# Patient Record
Sex: MALE | Race: WHITE | Employment: OTHER | URBAN - METROPOLITAN AREA
[De-identification: names, ages, dates, MRNs, and addresses within clinical notes are randomized per-mention and may not be internally consistent; named-entity substitution may affect disease eponyms.]

---

## 2017-01-13 DIAGNOSIS — I10 ESSENTIAL HYPERTENSION: ICD-10-CM

## 2017-01-13 DIAGNOSIS — I10 HYPERTENSION GOAL BP (BLOOD PRESSURE) < 140/90: Primary | ICD-10-CM

## 2017-01-13 RX ORDER — AMLODIPINE BESYLATE 5 MG/1
10 TABLET ORAL DAILY
Qty: 180 TABLET | Refills: 3 | Status: SHIPPED | OUTPATIENT
Start: 2017-01-13 | End: 2017-06-26

## 2017-01-13 RX ORDER — METOPROLOL SUCCINATE 100 MG/1
100 TABLET, EXTENDED RELEASE ORAL DAILY
Qty: 90 TABLET | Refills: 3 | Status: SHIPPED | OUTPATIENT
Start: 2017-01-13 | End: 2017-06-26

## 2017-01-13 NOTE — TELEPHONE ENCOUNTER
Metoprolol ER Succ    mg  Last Written Prescription Date: 01/08/2016  Last Fill Quantity: 90, # refills: 03    Last Office Visit with [facility], [facility] or [facility] prescribing provider:  12/29/2016   Future Office Visit:        BP Readings from Last 3 Encounters:   09/02/16 130/82   08/28/16 118/72   01/08/16 135/87       Amlodipine 5 mg tabs      Last Written Prescription Date: 01/06/2016  Last Fill Quantity: 180, # refills: 03  Last Office Visit with [facility], [facility] or [facility] prescribing provider: 12/29/2016       POTASSIUM   Date Value Ref Range Status   01/08/2016 4.1 3.4 - 5.3 mmol/L Final     CREATININE   Date Value Ref Range Status   01/08/2016 0.98 0.66 - 1.25 mg/dL Final     BP Readings from Last 3 Encounters:   09/02/16 130/82   08/28/16 118/72   01/08/16 135/87     Adam Bo  Floshayy Technician  [facility]

## 2017-02-09 ENCOUNTER — OFFICE VISIT (OUTPATIENT)
Dept: DERMATOLOGY | Facility: CLINIC | Age: 67
End: 2017-02-09

## 2017-02-09 VITALS — HEART RATE: 85 BPM | SYSTOLIC BLOOD PRESSURE: 121 MMHG | DIASTOLIC BLOOD PRESSURE: 83 MMHG

## 2017-02-09 DIAGNOSIS — L72.11 PILAR CYST: Primary | ICD-10-CM

## 2017-02-09 RX ORDER — LIDOCAINE HYDROCHLORIDE AND EPINEPHRINE 10; 10 MG/ML; UG/ML
3 INJECTION, SOLUTION INFILTRATION; PERINEURAL ONCE
Qty: 3 ML | Refills: 0 | OUTPATIENT
Start: 2017-02-09 | End: 2017-02-09

## 2017-02-09 ASSESSMENT — PAIN SCALES - GENERAL
PAINLEVEL: NO PAIN (0)
PAINLEVEL: NO PAIN (0)

## 2017-02-09 NOTE — LETTER
2/9/2017       RE: Juan Jose Razo  [address] [address]     Dear Colleague,    Thank you for referring your patient, Juan Jose Razo, to the [facility] DERMATOLOGY at [facility]. Please see a copy of my visit note below.    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  Excision was indicated.  We discussed the principles of treatment and most likely complications including bleeding, infection, wound dehiscence, pain, nerve damage, and scarring.  Informed consent was obtained and the patient underwent the procedure as follows.    PROCEDURE:  The patient was taken to the operative suite.  The treatment area was anesthetized with 1% lidocaine and epinephrine mixed 1:100,000 with 0.5% Marcaine.  The area was washed with Hibiclens, rinsed with saline and draped with sterile towels.  The lesion was delineated and excised down to subcutaneous fat.  Hemostasis was obtained by electrocoagulation.  The final excision size was 2.0 cm x 8 cm.      REPAIR:  In order to repair this defect while maintaining the normal anatomic relations and function, we elected to utilize an intermediate linear closure.  Closure was oriented so that the wound was in the patient's natural skin tension lines.  Deeper layers of the subcutaneous tissue were undermined first.  Deep dermal and subcutaneous layer closure was performed using two 3-0 Vicryl deep, intradermal and subcutaneous buried vertical mattresssutures.  Two redundant columns were removed by triangulation.  Final cutaneous approximation was achieved with three 3-0 Prolene simple interrupted  sutures.     The final wound length was 2.0 cm.  A total of 9 ml of anesthesia was administered for all surgical sites.  Estimated blood loss was less than 1 ml for all surgical sites.  A sterile pressure dressing was applied and wound care instructions, with a written handout, were given.  The [facility] alert and ambulatory.    Suture removal: 2 weeks    I have personally examined this patient and agree with Dr. Cunha's documentation and plan of care. I have reviewed and amended the resident's note above. The documentation accurately reflects my clinical observations, diagnoses, treatment and follow-up plans. I was present for key portions of the procedure.       Angelica Monahan MD  Dermatology Staff      Again, thank you for allowing me to participate in the care of your patient.      Sincerely,    Bertram Cunha MD

## 2017-02-09 NOTE — PROGRESS NOTES
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  Excision was indicated.  We discussed the principles of treatment and most likely complications including bleeding, infection, wound dehiscence, pain, nerve damage, and scarring.  Informed consent was obtained and the patient underwent the procedure as follows.    PROCEDURE:  The patient was taken to the operative suite.  The treatment area was anesthetized with 1% lidocaine and epinephrine mixed 1:100,000 with 0.5% Marcaine.  The area was washed with Hibiclens, rinsed with saline and draped with sterile towels.  The lesion was delineated and excised down to subcutaneous fat.  Hemostasis was obtained by electrocoagulation.  The final excision size was 2.0 cm x 8 cm.      REPAIR:  In order to repair this defect while maintaining the normal anatomic relations and function, we elected to utilize an intermediate linear closure.  Closure was oriented so that the wound was in the patient's natural skin tension lines.  Deeper layers of the subcutaneous tissue were undermined first.  Deep dermal and subcutaneous layer closure was performed using two 3-0 Vicryl deep, intradermal and subcutaneous buried vertical mattresssutures.  Two redundant columns were removed by triangulation.  Final cutaneous approximation was achieved with three 3-0 Prolene simple interrupted sutures.     The final wound length was 2.0 cm.  A total of 9 ml of anesthesia was administered for all surgical sites.  Estimated blood loss was less than 1 ml for all surgical sites.  A sterile pressure dressing was applied and wound care instructions, with a written handout, were  given.  The patient was [facility] alert and ambulatory.    Suture removal: 2 weeks    I have personally examined this patient and agree with Dr. Cunha's documentation and plan of care. I have reviewed and amended the resident's note above. The documentation accurately reflects my clinical observations, diagnoses, treatment and follow-up plans. I was present for key portions of the procedure.       Angelica Monahan MD  Dermatology Staff

## 2017-02-09 NOTE — NURSING NOTE
Dermatology Rooming Note    Juan Jose Razo's goals for this visit include:   Chief Complaint   Patient presents with     Derm Problem     Juan Jose is here today for an excision of a cyst on the left side of his scalp.             TIM May

## 2017-02-09 NOTE — PATIENT INSTRUCTIONS
Excision Wound Care Instructions  After your surgery, a pressure bandage will be placed over the area that has sutures. This will help prevent bleeding. Please follow these instructions until you come back to clinic for suture removal in 14 days, as they will help you to prevent complications as your wound heals.  For the First 48 hours After Surgery:  1. Leave the pressure bandage on and keep it dry. If it should come loose, you may retape it, but do not take it off.  2. Relax and take it easy. Do not do any vigorous exercise, heavy lifting, or bending forward. This could cause the wound to bleed.  3. Post-operative pain is usually mild. You may take plain or extra strength Tylenol every 4 hours as needed (do not take more than 4,000mg in one day). Do not take any medicine that contains aspirin, ibuprofen or motrin unless you have been recommended these by a doctor.  Avoid alcohol and vitamin E as these may increase your tendency to bleed.  4. You may put an ice pack around the bandaged area for 20 minutes every 2-3 hours. This may help reduce swelling, bruising, and pain. Make sure the ice pack is waterproof so that the pressure bandage does not get wet.   5. You may see a small amount of drainage or blood on your pressure bandage. This is normal. However, if drainage or bleeding continues or saturates the bandage, you will need to apply firm pressure over the bandage with a washcloth for 15 minutes. If bleeding continues after applying pressure for 15 minutes then go to the nearest emergency room.  48 Hours After Surgery  Carefully remove the bandage and start daily wound care and dressing changes. You may also now shower and get the wound wet. Wash wound with a mild soap and water.  Use caution when washing the wound. Be gentle and do not let the forceful shower stream hit the wound directly.  PAT dry.  Daily Wound Care:  1. Wash wound with a mild soap and water.  Use caution when washing the wound, be gentle  and do not let the forceful shower stream hit the wound directly.  2. PAT DRY.  3. Apply Vaseline (from a new container or tube) over the suture line with a Q-tip. It is very important to keep the wound continuously moist, as wounds heal best in a moist environment.  4.  Keep the site covered until sutures are removed, you can cover it with a Telfa (non-stick) dressing and tape or a band-aid.    5. If you are unable to keep wound covered, you must apply Vaseline every 2 - 3 hours (while awake) to ensure it is being kept moist for optimal healing. A dressing overnight is recommended to keep the area moist.   Call Us If:  1. You have pain that is not controlled with Tylenol.  2. You have signs or symptoms of an infection, such as: fever over 100 degrees F, redness, warmth, or foul-smelling or yellow/creamy drainage from the wound.  Who should I call with questions?    [facility]: [phone]     [facility]: [phone]    For urgent needs outside of business hours call the [facility] at [phone] and ask for the dermatology resident on call

## 2017-02-09 NOTE — MR AVS SNAPSHOT
After Visit Summary   2/9/2017    Juan Jose Razo    MRN: [id number]           Patient Information     Date Of Birth          1950        Visit Information        Provider Department      2/9/2017 9:15 AM Bertram Cunha MD [facility] Dermatology        Today's Diagnoses     Pilar cyst    -  1       Care Instructions    Excision Wound Care Instructions  After your surgery, a pressure bandage will be placed over the area that has sutures. This will help prevent bleeding. Please follow these instructions until you come back to clinic for suture removal in 14 days, as they will help you to prevent complications as your wound heals.  For the First 48 hours After Surgery:  1. Leave the pressure bandage on and keep it dry. If it should come loose, you may retape it, but do not take it off.  2. Relax and take it easy. Do not do any vigorous exercise, heavy lifting, or bending forward. This could cause the wound to bleed.  3. Post-operative pain is usually mild. You may take plain or extra strength Tylenol every 4 hours as needed (do not take more than 4,000mg in one day). Do not take any medicine that contains aspirin, ibuprofen or motrin unless you have been recommended these by a doctor.  Avoid alcohol and vitamin E as these may increase your tendency to bleed.  4. You may put an ice pack around the bandaged area for 20 minutes every 2-3 hours. This may help reduce swelling, bruising, and pain. Make sure the ice pack is waterproof so that the pressure bandage does not get wet.   5. You may see a small amount of drainage or blood on your pressure bandage. This is normal. However, if drainage or bleeding continues or saturates the bandage, you will need to apply firm pressure over the bandage with a washcloth for 15 minutes. If bleeding continues after applying pressure for 15 minutes then go to the nearest emergency room.  48 Hours After Surgery  Carefully remove the bandage and start daily wound care  and dressing changes. You may also now shower and get the wound wet. Wash wound with a mild soap and water.  Use caution when washing the wound. Be gentle and do not let the forceful shower stream hit the wound directly.  PAT dry.  Daily Wound Care:  1. Wash wound with a mild soap and water.  Use caution when washing the wound, be gentle and do not let the forceful shower stream hit the wound directly.  2. PAT DRY.  3. Apply Vaseline (from a new container or tube) over the suture line with a Q-tip. It is very important to keep the wound continuously moist, as wounds heal best in a moist environment.  4.  Keep the site covered until sutures are removed, you can cover it with a Telfa (non-stick) dressing and tape or a band-aid.    5. If you are unable to keep wound covered, you must apply Vaseline every 2 - 3 hours (while awake) to ensure it is being kept moist for optimal healing. A dressing overnight is recommended to keep the area moist.   Call Us If:  1. You have pain that is not controlled with Tylenol.  2. You have signs or symptoms of an infection, such as: fever over 100 degrees F, redness, warmth, or foul-smelling or yellow/creamy drainage from the wound.  Who should I call with questions?    [facility]: [phone]     [facility]: [phone]    For urgent needs outside of business hours call the [facility] at [phone] and ask for the dermatology resident on call          Follow-ups after your visit        Who to contact     Please call your clinic at [phone] to:    Ask questions about your health    Make or cancel appointments    Discuss your medicines    Learn about your test results    Speak to your doctor   If you have compliments or concerns about an experience at your clinic, or if you wish to file a complaint, please contact [facility] Patient Relations at [phone] or email us at  [email or web address]         Additional Information About Your Visit        MyChart Information     my[employer] gives you secure access to your electronic health record. If you see a primary care provider, you can also send messages to your care team and make appointments. If you have questions, please call your primary care clinic.  If you do not have a primary care provider, please call [phone] and they will assist you.      [employer] is an electronic gateway that provides easy, online access to your medical records. With [employer], you can request a clinic appointment, read your test results, renew a prescription or communicate with your care team.     To access your existing account, please contact your [facility] or call [phone] for assistance.        Care EveryWhere ID     This is your Care EveryWhere ID. This could be used by other organizations to access your [place] medical records  [id number]        Your Vitals Were     Pulse                   85            Blood Pressure from Last 3 Encounters:   02/09/17 121/83   09/02/16 130/82   08/28/16 118/72    Weight from Last 3 Encounters:   09/02/16 127.461 kg (281 lb)   08/28/16 125.193 kg (276 lb)   01/08/16 127.461 kg (281 lb)              We Performed the Following     Surgical pathology exam        Primary Care Provider Office Phone # Fax #    Alphonso Ceasar Grover [phone]       [facility] [address] [address]        Thank you!     Thank you for choosing [facility] DERMATOLOGY  for your care. Our goal is always to provide you with excellent care. Hearing back from our patients is one way we can continue to improve our services. Please take a few minutes to complete the written survey that you may receive in the mail after your visit with us. Thank you!             Your Updated Medication List - Protect others around you: Learn how to safely use, store  and throw away your medicines at www.disposemymeds.org.          This list is accurate as of: 2/9/17 10:04 AM.  Always use your most recent med list.                   Brand Name Dispense Instructions for use    amLODIPine 5 MG tablet    NORVASC    180 tablet    Take 2 tablets (10 mg) by mouth daily Please schedule an annual exam and fasting labs with Dr. Grover before any further refills       CENTRUM SILVER ULTRA MENS PO          cholecalciferol 1000 UNIT tablet    vitamin D     Take by mouth daily       clotrimazole 1 % cream    LOTRIMIN    30 g    Apply topically 2 times daily       Desloratadine-Pseudoephedrine 5-240 MG Tb24    CLARINEX-D 24 HOUR    30 tablet    Take 1 tablet by mouth daily as needed       hydrocortisone 0.2 % cream    WESTCORT    60 g    Apply topically 2 times daily       hydrocortisone 2.5 % ointment     30 g    Apply topically 2 times daily       LANsoprazole 30 MG CR capsule    PREVACID    90 capsule    Take 1 capsule (30 mg) by mouth daily       metoprolol 100 MG 24 hr tablet    TOPROL-XL    90 tablet    Take 1 tablet (100 mg) by mouth daily Please schedule an annual exam and fasting labs with Dr. Grover before any further refills       ranitidine 300 MG tablet    ZANTAC    90 tablet    Take 1 tablet (300 mg) by mouth At Bedtime       sildenafil 100 MG cap/tab    VIAGRA    6 tablet    Take 0.5-1 tablets ( mg) by mouth daily as needed for erectile dysfunction Take 30 min to 4 hours before intercourse.  Never use with nitroglycerin, terazosin or doxazosin.

## 2017-02-13 LAB — COPATH REPORT: NORMAL

## 2017-05-24 DIAGNOSIS — L30.4 INTERTRIGO: ICD-10-CM

## 2017-05-24 NOTE — TELEPHONE ENCOUNTER
Last seen 12/29/16.  Is on the recall list.        Resolved intertrigo. Keep area as dry as possible. Restart   nystatin cream tid and hydrocortisone 0.2% cream twice daily to affected areas if this returns until clear. Return to the office if this is not helping and seems to be worsening.    2. Skin tags without inflammation.   Snip removal (performed by faculty): After discussion of benefits and risks including but not limited to bleeding, infection, scar, incomplete removal, recurrence, and non-diagnostic biopsy, written consent and photographs were obtained. Time-out was performed. The area was cleaned with isopropyl alcohol. 4 mL of 1% lidocaine with epinephrine was injected to obtain adequate anesthesia of the lesions on the bilateral axilla.  The specimen were sent for pathology. Hemostasis was achieved with electrocaudery. Vaseline was applied. The patient tolerated the procedure and no complications were noted. The patient was provided with verbal and written post care instructions.      All risks, benefits and alternatives were discussed with patient.  Patient is in agreement and understands the assessment and plan.  All questions were answered.  Sun Screen Education was given.   Return to Clinic annually or sooner as needed.   Imelda Rincon PA-C

## 2017-05-25 RX ORDER — HYDROCORTISONE VALERATE CREAM 2 MG/G
CREAM TOPICAL 2 TIMES DAILY
Qty: 60 G | Refills: 1 | Status: SHIPPED | OUTPATIENT
Start: 2017-05-25 | End: 2018-01-22

## 2017-06-26 ENCOUNTER — OFFICE VISIT (OUTPATIENT)
Dept: FAMILY MEDICINE | Facility: CLINIC | Age: 67
End: 2017-06-26
Payer: COMMERCIAL

## 2017-06-26 VITALS
BODY MASS INDEX: 34.22 KG/M2 | HEART RATE: 77 BPM | DIASTOLIC BLOOD PRESSURE: 88 MMHG | WEIGHT: 273.8 LBS | TEMPERATURE: 98.1 F | OXYGEN SATURATION: 96 % | SYSTOLIC BLOOD PRESSURE: 120 MMHG

## 2017-06-26 DIAGNOSIS — I10 HYPERTENSION GOAL BP (BLOOD PRESSURE) < 140/90: Primary | ICD-10-CM

## 2017-06-26 DIAGNOSIS — Z00.00 ROUTINE GENERAL MEDICAL EXAMINATION AT A HEALTH CARE FACILITY: ICD-10-CM

## 2017-06-26 DIAGNOSIS — K21.9 GASTROESOPHAGEAL REFLUX DISEASE WITHOUT ESOPHAGITIS: ICD-10-CM

## 2017-06-26 DIAGNOSIS — Z11.59 NEED FOR HEPATITIS C SCREENING TEST: ICD-10-CM

## 2017-06-26 DIAGNOSIS — N52.01 ERECTILE DYSFUNCTION DUE TO ARTERIAL INSUFFICIENCY: ICD-10-CM

## 2017-06-26 DIAGNOSIS — Z13.6 CARDIOVASCULAR SCREENING; LDL GOAL LESS THAN 130: ICD-10-CM

## 2017-06-26 DIAGNOSIS — B35.4 TINEA CORPORIS: ICD-10-CM

## 2017-06-26 LAB
CREAT UR-MCNC: 319 MG/DL
MICROALBUMIN UR-MCNC: 18 MG/L
MICROALBUMIN/CREAT UR: 5.71 MG/G CR (ref 0–17)

## 2017-06-26 PROCEDURE — 82043 UR ALBUMIN QUANTITATIVE: CPT | Performed by: FAMILY MEDICINE

## 2017-06-26 PROCEDURE — 86803 HEPATITIS C AB TEST: CPT | Performed by: FAMILY MEDICINE

## 2017-06-26 PROCEDURE — 99396 PREV VISIT EST AGE 40-64: CPT | Mod: 25 | Performed by: FAMILY MEDICINE

## 2017-06-26 PROCEDURE — 80061 LIPID PANEL: CPT | Performed by: FAMILY MEDICINE

## 2017-06-26 PROCEDURE — 90715 TDAP VACCINE 7 YRS/> IM: CPT | Performed by: FAMILY MEDICINE

## 2017-06-26 PROCEDURE — 90471 IMMUNIZATION ADMIN: CPT | Performed by: FAMILY MEDICINE

## 2017-06-26 PROCEDURE — 80053 COMPREHEN METABOLIC PANEL: CPT | Performed by: FAMILY MEDICINE

## 2017-06-26 PROCEDURE — 36415 COLL VENOUS BLD VENIPUNCTURE: CPT | Performed by: FAMILY MEDICINE

## 2017-06-26 RX ORDER — SILDENAFIL 100 MG/1
50-100 TABLET, FILM COATED ORAL DAILY PRN
Qty: 6 TABLET | Refills: 1 | Status: SHIPPED | OUTPATIENT
Start: 2017-06-26 | End: 2018-12-18

## 2017-06-26 RX ORDER — AMLODIPINE BESYLATE 5 MG/1
10 TABLET ORAL DAILY
Qty: 180 TABLET | Refills: 3 | Status: SHIPPED | OUTPATIENT
Start: 2017-06-26 | End: 2018-07-02

## 2017-06-26 RX ORDER — LANSOPRAZOLE 30 MG/1
30 CAPSULE, DELAYED RELEASE ORAL DAILY
Qty: 90 CAPSULE | Refills: 3 | Status: SHIPPED | OUTPATIENT
Start: 2017-06-26 | End: 2018-08-08

## 2017-06-26 RX ORDER — METOPROLOL SUCCINATE 100 MG/1
100 TABLET, EXTENDED RELEASE ORAL DAILY
Qty: 90 TABLET | Refills: 3 | Status: SHIPPED | OUTPATIENT
Start: 2017-06-26 | End: 2018-07-02

## 2017-06-26 RX ORDER — CLOTRIMAZOLE 1 %
CREAM (GRAM) TOPICAL 2 TIMES DAILY
Qty: 30 G | Refills: 1 | Status: SHIPPED | OUTPATIENT
Start: 2017-06-26 | End: 2019-10-24

## 2017-06-26 NOTE — PROGRESS NOTES
SUBJECTIVE:     CC: Juan Jose Razo is an 66 year old male who presents for preventative health visit.     Physical   Annual:     Getting at least 3 servings of Calcium per day::  NO    Bi-annual eye exam::  Yes    Dental care twice a year::  Yes    Sleep apnea or symptoms of sleep apnea::  Excessive snoring    Frequency of exercise::  None    Taking medications regularly::  Yes    Medication side effects::  None    Additional concerns today::  No      Encounter Diagnoses   Name Primary?     Routine general medical examination at a health care facility      Need for hepatitis C screening test      Hypertension goal BP (blood pressure) < 140/90 Yes     CARDIOVASCULAR SCREENING; LDL GOAL LESS THAN 130      Erectile dysfunction due to arterial insufficiency      Tinea corporis      Gastroesophageal reflux disease without esophagitis         Hypertension Follow-up      Outpatient blood pressures are not being checked.    Low Salt Diet: no added salt      Today's PHQ-2 Score:   PHQ-2 ( 1999 Pfizer) 6/23/2017   Q1: Little interest or pleasure in doing things Not at all   Q2: Feeling down, depressed or hopeless Not at all   PHQ-2 Score 0       Abuse: Current or Past(Physical, Sexual or Emotional)- No  Do you feel safe in your environment - Yes    Social History   Substance Use Topics     Smoking status: Never Smoker     Smokeless tobacco: Never Used     Alcohol use Yes          Standardized Alcohol Screening Questionnaire  AUDIT   Questions 0 1 2 3 4 Score   1. How often do you have a drink  containing alcohol? Never Monthly or less 2 to 4  times a  month 2 to 3  times a  week 4 or more  times a  week  1   2. How many drinks containing alcohol  do you have on a typical day when you are drinking? 1 or 2 3 or 4 5 or 6 7 to 9 10 or more  0   3. How often do you have more than five  or more drinks on one occasion? Never Less  than  monthly Monthly Weekly Daily or  almost  daily  0   4. How often during the last year have  you  found that you were not able to stop drinking once you had started? Never Less  than  monthly Monthly Weekly Daily or  almost  daily  0   5. How often during the last year have  you failed to do what was normally expected of you because of drinking? Never Less  than  monthly Monthly Weekly Daily or  almost  daily  0   6. How often during the last year have  you needed a first drink in the morning to get yourself going after a heavy drinking session? Never Less  than  monthly Monthly Weekly Daily or  almost  daily  0   7. How often during the last year have you had a feeling of guilt or remorse after drinking? Never Less  than  monthly Monthly Weekly Daily or  almost  daily  0   8. How often during the last year have  you been unable to remember what happened the night before because of your drinking? Never Less  than  monthly Monthly Weekly Daily or  almost  daily  0   9. Have you or someone else been  injured because of your drinking? No  Yes, but not in the last year  Yes,  during the  last year  0   10. Has a relative, friend, doctor or other health care worker been concerned about your drinking or suggested you cut down? No  Yes, but not in the last year  Yes,  during the  last year  0   Total  1   Scoring: A score of 7 for adult men is an indication of hazardous drinking (risk for physical or physiological harm); a score of 8 or more is an indication of an alcohol use disorder. A score of 5 or more for adult women  is an indication of hazardous drinking or an alcohol use disorder.       Last PSA:   PSA   Date Value Ref Range Status   05/18/2010 0.7 ng/mL        Recent Labs   Lab Test  01/08/16   0849  11/14/14   0958  09/17/13   1040   CHOL  147  153  156   HDL  30*  38*  29*   LDL  91  79  77   TRIG  132  178*  250*   CHOLHDLRATIO   --   4.0  5.4*   NHDL  117   --    --        Reviewed orders with patient. Reviewed health maintenance and updated orders accordingly - Yes    Reviewed and updated as needed this  visit by clinical staff  Tobacco  Allergies  Meds  Med Hx  Surg Hx  Fam Hx  Soc Hx        Reviewed and updated as needed this visit by Provider        Past Medical History:   Diagnosis Date     Acid reflux      Allergies      HTN (hypertension) 1990     Squamous cell carcinoma      Varicose veins         ROS:  C: NEGATIVE for fever, chills, change in weight  I: NEGATIVE for worrisome rashes, moles or lesions  E: NEGATIVE for vision changes or irritation  ENT: NEGATIVE for ear, mouth and throat problems  R: NEGATIVE for significant cough or SOB  CV: NEGATIVE for chest pain, palpitations or peripheral edema  GI: NEGATIVE for nausea, abdominal pain, heartburn, or change in bowel habits   male: negative for dysuria, hematuria, decreased urinary stream, erectile dysfunction, urethral discharge  M: NEGATIVE for significant arthralgias or myalgia  N: NEGATIVE for weakness, dizziness or paresthesias  E: NEGATIVE for temperature intolerance, skin/hair changes  P: NEGATIVE for changes in mood or affect    Problem list, Medication list, Allergies, and Medical/Social/Surgical histories reviewed in EPIC and updated as appropriate.  OBJECTIVE:     /88  Pulse 77  Temp 98.1  F (36.7  C) (Oral)  Wt 273 lb 12.8 oz (124.2 kg)  SpO2 96%  BMI 34.22 kg/m2  EXAM:  GENERAL: healthy, alert and no distress  EYES: Eyes grossly normal to inspection, PERRL and conjunctivae and sclerae normal  HENT: ear canals and TM's normal, nose and mouth without ulcers or lesions  NECK: no adenopathy, no asymmetry, masses, or scars and thyroid normal to palpation  RESP: lungs clear to auscultation - no rales, rhonchi or wheezes  CV: regular rate and rhythm, normal S1 S2, no S3 or S4, no murmur, click or rub, no peripheral edema and peripheral pulses strong  ABDOMEN: soft, nontender, no hepatosplenomegaly, no masses and bowel sounds normal   (male): normal male genitalia without lesions or urethral discharge, no hernia  RECTAL (male):  normal sphincter tone, no rectal masses, prostate normal size, smooth, nontender without nodules or masses  MS: no gross musculoskeletal defects noted, no edema  SKIN: no suspicious lesions or rashes  NEURO: Normal strength and tone, mentation intact and speech normal  PSYCH: mentation appears normal, affect normal/bright  LYMPH: no cervical, supraclavicular, axillary, or inguinal adenopathy    ASSESSMENT/PLAN:     1. Routine general medical examination at a health care facility  overal well  - Comprehensive metabolic panel (BMP + Alb, Alk Phos, ALT, AST, Total. Bili, TP)  - TDAP VACCINE (BOOSTRIX)    2. Need for hepatitis C screening test  sent  - Hepatitis C Screen Reflex to HCV RNA Quant and Genotype       3. Hypertension goal BP (blood pressure) < 140/90  Well controlled   - Comprehensive metabolic panel (BMP + Alb, Alk Phos, ALT, AST, Total. Bili, TP)  - Lipid Profile (Chol, Trig, HDL, LDL calc)  - amLODIPine (NORVASC) 5 MG tablet; Take 2 tablets (10 mg) by mouth daily  Dispense: 180 tablet; Refill: 3  - metoprolol (TOPROL-XL) 100 MG 24 hr tablet; Take 1 tablet (100 mg) by mouth daily  Dispense: 90 tablet; Refill: 3  - Albumin Random Urine Quantitative    4. CARDIOVASCULAR SCREENING; LDL GOAL LESS THAN 130  recheck    5. Erectile dysfunction due to arterial insufficiency  try  - sildenafil (VIAGRA) 100 MG cap/tab; Take 0.5-1 tablets ( mg) by mouth daily as needed for erectile dysfunction Take 30 min to 4 hours before intercourse.  Never use with nitroglycerin, terazosin or doxazosin.  Dispense: 6 tablet; Refill: 1    6. Tinea corporis  Ok refill  - clotrimazole (LOTRIMIN) 1 % cream; Apply topically 2 times daily  Dispense: 30 g; Refill: 1    7. Gastroesophageal reflux disease without esophagitis  Well controlled   - ranitidine (ZANTAC) 300 MG tablet; Take 1 tablet (300 mg) by mouth At Bedtime  Dispense: 90 tablet; Refill: 3  - LANsoprazole (PREVACID) 30 MG CR capsule; Take 1 capsule (30 mg) by mouth daily  " Dispense: 90 capsule; Refill: 3    COUNSELING:   Reviewed preventive health counseling, as reflected in patient instructions       Regular exercise       Healthy diet/nutrition       Vision screening       Hearing screening       Safe sex practices/STD prevention       Colon cancer screening       Prostate cancer screening    Having some ED issues/ wants to try medication      reports that he has never smoked. He has never used smokeless tobacco.    Estimated body mass index is 34.22 kg/(m^2) as calculated from the following:    Height as of 1/8/16: 6' 3\" (1.905 m).    Weight as of this encounter: 273 lb 12.8 oz (124.2 kg).   Weight management plan: Discussed healthy diet and exercise guidelines and patient will follow up in 12 months in clinic to re-evaluate.    Counseling Resources:  ATP IV Guidelines  Pooled Cohorts Equation Calculator  FRAX Risk Assessment  ICSI Preventive Guidelines  Dietary Guidelines for Americans, 2010  USDA's MyPlate  ASA Prophylaxis  Lung CA Screening    Alphonso Grover MD  [facility]  Answers for HPI/ROS submitted by the patient on 6/23/2017   PHQ-2 Score: 0    "

## 2017-06-26 NOTE — PROGRESS NOTES
"Chief Complaint   Patient presents with     Physical       Initial /88  Pulse 77  Temp 98.1  F (36.7  C) (Oral)  Wt 273 lb 12.8 oz (124.2 kg)  SpO2 96%  BMI 34.22 kg/m2 Estimated body mass index is 34.22 kg/(m^2) as calculated from the following:    Height as of 1/8/16: 6' 3\" (1.905 m).    Weight as of this encounter: 273 lb 12.8 oz (124.2 kg).  Medication Reconciliation: complete     Marilin Ferreira, Student MA         "

## 2017-06-27 LAB
ALBUMIN SERPL-MCNC: 4.1 G/DL (ref 3.4–5)
ALP SERPL-CCNC: 87 U/L (ref 40–150)
ALT SERPL W P-5'-P-CCNC: 45 U/L (ref 0–70)
ANION GAP SERPL CALCULATED.3IONS-SCNC: 7 MMOL/L (ref 3–14)
AST SERPL W P-5'-P-CCNC: 35 U/L (ref 0–45)
BILIRUB SERPL-MCNC: 1.4 MG/DL (ref 0.2–1.3)
BUN SERPL-MCNC: 18 MG/DL (ref 7–30)
CALCIUM SERPL-MCNC: 9.1 MG/DL (ref 8.5–10.1)
CHLORIDE SERPL-SCNC: 104 MMOL/L (ref 94–109)
CHOLEST SERPL-MCNC: 181 MG/DL
CO2 SERPL-SCNC: 26 MMOL/L (ref 20–32)
CREAT SERPL-MCNC: 1.15 MG/DL (ref 0.66–1.25)
GFR SERPL CREATININE-BSD FRML MDRD: 64 ML/MIN/1.7M2
GLUCOSE SERPL-MCNC: 89 MG/DL (ref 70–99)
HCV AB SERPL QL IA: NORMAL
HDLC SERPL-MCNC: 42 MG/DL
LDLC SERPL CALC-MCNC: 104 MG/DL
NONHDLC SERPL-MCNC: 139 MG/DL
POTASSIUM SERPL-SCNC: 4.7 MMOL/L (ref 3.4–5.3)
PROT SERPL-MCNC: 8.3 G/DL (ref 6.8–8.8)
SODIUM SERPL-SCNC: 137 MMOL/L (ref 133–144)
TRIGL SERPL-MCNC: 174 MG/DL

## 2017-08-10 DIAGNOSIS — J30.1 ACUTE SEASONAL ALLERGIC RHINITIS DUE TO POLLEN: Primary | ICD-10-CM

## 2017-08-10 NOTE — TELEPHONE ENCOUNTER
Patient requesting refill of:     Desloratadine-Pseudoephedrine (CLARINEX-D 24 HOUR) 5-240 MG TB24    Last prescribed: 09/10/2015 (routing to provider, medication last prescribed more than one year ago).  Last office visit: 06/262017    Lisa Hernandez RN  [facility]

## 2017-08-10 NOTE — TELEPHONE ENCOUNTER
Clarinex      Last Written Prescription Date: 9/10/15  Last Fill Quantity: 30,  # refills: 2   Last Office Visit with G, P or [facility] prescribing provider: 6/26/17

## 2017-08-11 ENCOUNTER — TELEPHONE (OUTPATIENT)
Dept: FAMILY MEDICINE | Facility: CLINIC | Age: 67
End: 2017-08-11

## 2017-08-11 DIAGNOSIS — J30.1 ACUTE SEASONAL ALLERGIC RHINITIS DUE TO POLLEN: Primary | ICD-10-CM

## 2017-08-11 NOTE — TELEPHONE ENCOUNTER
Pharmacy states the Desloratadine-Pseudoephedrine (CLARINEX-D 24 HOUR) 5-240 MG TB24 I no longer available in 24 tabs, they are available in 1 hr and pharmacy is requesting to change to that 2 times a day    Please call [phone]

## 2017-08-11 NOTE — TELEPHONE ENCOUNTER
Dr. Grover    24 med is not available    Cued the 12 hour beulah Ruiz RN   [facility]

## 2017-09-26 ENCOUNTER — ALLIED HEALTH/NURSE VISIT (OUTPATIENT)
Dept: NURSING | Facility: CLINIC | Age: 67
End: 2017-09-26
Payer: COMMERCIAL

## 2017-09-26 DIAGNOSIS — Z23 NEED FOR PROPHYLACTIC VACCINATION AND INOCULATION AGAINST INFLUENZA: Primary | ICD-10-CM

## 2017-09-26 PROCEDURE — 90688 IIV4 VACCINE SPLT 0.5 ML IM: CPT

## 2017-09-26 PROCEDURE — 90471 IMMUNIZATION ADMIN: CPT

## 2017-09-26 PROCEDURE — 99207 ZZC NO CHARGE NURSE ONLY: CPT

## 2017-09-26 NOTE — MR AVS SNAPSHOT
After Visit Summary   9/26/2017    Juan Jose Razo    MRN: [id number]           Patient Information     Date Of Birth          1950        Visit Information        Provider Department      9/26/2017 10:00 AM MAUDE MCCALLUM MA/LPN [facility]        Today's Diagnoses     Need for prophylactic vaccination and inoculation against influenza    -  1       Follow-ups after your visit        Who to contact     If you have questions or need follow up information about today's clinic visit or your schedule please contact [facility] directly at [phone].  Normal or non-critical lab and imaging results will be communicated to you by [email or web address], letter or phone within 4 business days after the clinic has received the results. If you do not hear from us within 7 days, please contact the clinic through [email or web address] or phone. If you have a critical or abnormal lab result, we will notify you by phone as soon as possible.  Submit refill requests through Loopt or call your pharmacy and they will forward the refill request to us. Please allow 3 business days for your refill to be completed.          Additional Information About Your Visit        MyChart Information     Loopt gives you secure access to your electronic health record. If you see a primary care provider, you can also send messages to your care team and make appointments. If you have questions, please call your primary care clinic.  If you do not have a primary care provider, please call [phone] and they will assist you.        Care EveryWhere ID     This is your Care EveryWhere ID. This could be used by other organizations to access your [facility] medical records  [id number]         Blood Pressure from Last 3 Encounters:   06/26/17 120/88   02/09/17 121/83   09/02/16 130/82    Weight from Last 3 Encounters:   06/26/17 273 lb 12.8 oz (124.2 kg)   09/02/16 281 lb (127.5 kg)   08/28/16 276 lb (125.2 kg)              We  Performed the Following     FLU VACCINE, 3 YRS +, IM (QUADRIVALENT W/PRESERVATIVES/MULTI-DOSE) [70908]     Vaccine Administration, Initial [89649]        Primary Care Provider Office Phone # Fax #    Alphonso Ceasar Grover [phone]       [address] [facility] [address]  [facility] [address]        Equal Access to Services     [facility]: Hadii aad ku hadasho Soomaali, waaxda luqadaha, qaybta kaalmada adeegyada, waxay idiin hayaan adeeg khdannie latrell . So [facility] [phone].    ATENCIÓN: Si habla esplan, tiene a lo disposición servicios gratuitos de asistencia lingüística. Fabienne al [phone].    We comply with applicable federal civil rights laws and [place] laws. We do not discriminate on the basis of race, color, national origin, age, disability sex, sexual orientation or gender identity.            Thank you!     Thank you for choosing [facility]  for your care. Our goal is always to provide you with excellent care. Hearing back from our patients is one way we can continue to improve our services. Please take a few minutes to complete the written survey that you may receive in the mail after your visit with us. Thank you!             Your Updated Medication List - Protect others around you: Learn how to safely use, store and throw away your medicines at www.disposemymeds.org.          This list is accurate as of: 9/26/17 10:07 AM.  Always use your most recent med list.                   Brand Name Dispense Instructions for use Diagnosis    amLODIPine 5 MG tablet    NORVASC    180 tablet    Take 2 tablets (10 mg) by mouth daily    Hypertension goal BP (blood pressure) < 140/90       CENTRUM SILVER ULTRA MENS PO           cholecalciferol 1000 UNIT tablet    vitamin D     Take by mouth daily        clotrimazole 1 % cream    LOTRIMIN    30 g    Apply topically 2 times daily    Tinea corporis       * Desloratadine-Pseudoephedrine 5-240 MG Tb24    CLARINEX-D 24 HOUR    30 tablet     Take 1 tablet by mouth daily as needed    Acute seasonal allergic rhinitis due to pollen       * Desloratadine-Pseudoephedrine 2.5-120 MG Tb12    CLARINEX-D 12 HOUR    60 tablet    Take 1 capsule by mouth 2 times daily    Acute seasonal allergic rhinitis due to pollen       hydrocortisone 0.2 % cream    WESTCORT    60 g    Apply topically 2 times daily    Intertrigo       hydrocortisone 2.5 % ointment     30 g    Apply topically 2 times daily    Tinea corporis       LANsoprazole 30 MG CR capsule    PREVACID    90 capsule    Take 1 capsule (30 mg) by mouth daily    Gastroesophageal reflux disease without esophagitis       metoprolol 100 MG 24 hr tablet    TOPROL-XL    90 tablet    Take 1 tablet (100 mg) by mouth daily    Hypertension goal BP (blood pressure) < 140/90       ranitidine 300 MG tablet    ZANTAC    90 tablet    Take 1 tablet (300 mg) by mouth At Bedtime    Gastroesophageal reflux disease without esophagitis       * sildenafil 100 MG tablet    VIAGRA    6 tablet    Take 0.5-1 tablets ( mg) by mouth daily as needed for erectile dysfunction Take 30 min to 4 hours before intercourse.  Never use with nitroglycerin, terazosin or doxazosin.    ED (erectile dysfunction)       * sildenafil 100 MG tablet    VIAGRA    6 tablet    Take 0.5-1 tablets ( mg) by mouth daily as needed for erectile dysfunction Take 30 min to 4 hours before intercourse.  Never use with nitroglycerin, terazosin or doxazosin.    Erectile dysfunction due to arterial insufficiency       * Notice:  This list has 4 medication(s) that are the same as other medications prescribed for you. Read the directions carefully, and ask your doctor or other care provider to review them with you.

## 2017-09-26 NOTE — PROGRESS NOTES
Injectable Influenza Immunization Documentation    1.  Is the person to be vaccinated sick today?  Yes    2. Does the person to be vaccinated have an allergy to a component   of the vaccine?   No    3. Has the person to be vaccinated ever had a serious reaction   to influenza vaccine in the past?   No    4. Has the person to be vaccinated ever had Guillain-Barré syndrome?   No    Form completed by Porsche Ogden CMA

## 2017-10-11 ENCOUNTER — OFFICE VISIT (OUTPATIENT)
Dept: SLEEP MEDICINE | Facility: CLINIC | Age: 67
End: 2017-10-11
Attending: INTERNAL MEDICINE
Payer: COMMERCIAL

## 2017-10-11 VITALS
SYSTOLIC BLOOD PRESSURE: 126 MMHG | HEART RATE: 75 BPM | RESPIRATION RATE: 18 BRPM | WEIGHT: 281 LBS | OXYGEN SATURATION: 96 % | HEIGHT: 75 IN | DIASTOLIC BLOOD PRESSURE: 86 MMHG | BODY MASS INDEX: 34.94 KG/M2

## 2017-10-11 DIAGNOSIS — R06.83 SNORING: Primary | ICD-10-CM

## 2017-10-11 DIAGNOSIS — G47.30 OBSERVED SLEEP APNEA: ICD-10-CM

## 2017-10-11 PROCEDURE — 99211 OFF/OP EST MAY X REQ PHY/QHP: CPT | Mod: ZF

## 2017-10-11 NOTE — NURSING NOTE
"Chief Complaint   Patient presents with     Consult     Discuss getting dental device MAP       Initial /86  Pulse 75  Resp 18  Ht 1.905 m (6' 3\")  Wt 127.5 kg (281 lb)  SpO2 96%  BMI 35.12 kg/m2 Estimated body mass index is 35.12 kg/(m^2) as calculated from the following:    Height as of this encounter: 1.905 m (6' 3\").    Weight as of this encounter: 127.5 kg (281 lb).  Medication Reconciliation: complete     TIM Sanchez        "

## 2017-10-11 NOTE — PATIENT INSTRUCTIONS
Your BMI is Body mass index is 35.12 kg/(m^2).  Weight management is a personal decision.  If you are interested in exploring weight loss strategies, the following discussion covers the approaches that may be successful. Body mass index (BMI) is one way to tell whether you are at a healthy weight, overweight, or obese. It measures your weight in relation to your height.  A BMI of 18.5 to 24.9 is in the healthy range. A person with a BMI of 25 to 29.9 is considered overweight, and someone with a BMI of 30 or greater is considered obese. More than two-thirds of American adults are considered overweight or obese.  Being overweight or obese increases the risk for further weight gain. Excess weight may lead to heart disease and diabetes.  Creating and following plans for healthy eating and physical activity may help you improve your health.  Weight control is part of healthy lifestyle and includes exercise, emotional health, and healthy eating habits. Careful eating habits lifelong are the mainstay of weight control. Though there are significant health benefits from weight loss, long-term weight loss with diet alone may be very difficult to achieve- studies show long-term success with dietary management in less than 10% of people. Attaining a healthy weight may be especially difficult to achieve in those with severe obesity. In some cases, medications, devices and surgical management might be considered.  What can you do?  If you are overweight or obese and are interested in methods for weight loss, you should discuss this with your provider.     Consider reducing daily calorie intake by 500 calories.     Keep a food journal.     Avoiding skipping meals, consider cutting portions instead.    Diet combined with exercise helps maintain muscle while optimizing fat loss. Strength training is particularly important for building and maintaining muscle mass. Exercise helps reduce stress, increase energy, and improves fitness.  "Increasing exercise without diet control, however, may not burn enough calories to loose weight.       Start walking three days a week 10-20 minutes at a time    Work towards walking thirty minutes five days a week     Eventually, increase the speed of your walking for 1-2 minutes at time    In addition, we recommend that you review healthy lifestyles and methods for weight loss available through the National Institutes of Health patient information sites:  http://win.niddk.nih.gov/publications/index.htm    And look into health and wellness programs that may be available through your health insurance provider, employer, local community center, or [employer].    Weight management plan: Patient was referred to their PCP to discuss a diet and exercise plan.    MY INFORMATION ON SLEEP APNEA-  Juan Jose Razo    DOCTOR : Ziyad Blount  SLEEP CENTER :      MY CONTACT NUMBER:   [facility]  [phone]  [facility]   [phone]  [facility]   [phone]      [facility]  [phone]  [facility]   [phone]      Alfonso Points:  1. What is Obstructive Sleep Apnea (BUCK)? BUCK is the most common type of sleep apnea. Apnea literally means, \"without breath.\" It is characterized by repetitive pauses in breathing, despite continued effort to breathe, and is usually associated with a reduction in blood oxygen saturation. Apneas can last 10 to over 60 seconds. It is caused by narrowing or collapse of the upper airway as muscles relax during sleep.   2. What are the consequences of BUCK? Symptoms include: daytime sleepiness- possibly increasing the risk of falling asleep while driving, unrefreshing/restless sleep, snoring, insomnia, waking frequently to urinate, waking with heartburn or reflux, reduced concentration and memory, and morning headaches. Other health consequences may include development of high blood " pressure. Untreated BUKC also can contribute to heart disease, stroke and diabetes.   3. What are the treatment options? In most situations, sleep apnea is a lifelong disease that must be managed with daily therapy. Continuous Positive Airway (CPAP) is the most reliable treatment. A mouthguard to hold your jaw forward is usually the next most reliable option. Other options include postioning devices (to keep you off your back), nasal valves, tongue retaining device, weight loss, surgery. There is more detail about these options toward the end of this document.  4. What are the most important things to remember about using CPAP?     WHERE CAN I FIND MORE INFORMATION?    American Academy of Sleep Medicine Patient information on sleep disorders:  http://yoursleep.aasmnet.org    CPAP -  WHY AND HOW?                 Continuous positive airway pressure, or CPAP, is the most effective treatment for obstructive sleep apnea. It works by blowing room air, through a mask, to hold your throat open. A decision to use CPAP is a major step forward in the pursuit of a healthier life. The successful use of CPAP will help you breathe easier, sleep better and live healthier. Using CPAP can be a positive experience if you keep these wallace points in mind:  1. Commitment  CPAP is not a quick fix for your problem. It involves a long-term commitment to improve your sleep and your health.    2. Communication  Stay in close communication with both your sleep doctor and your CPAP supplier. Ask lots of questions and seek help when you need it.    3. Consistency  Use CPAP all night, every night and for every nap. You will receive the maximum health benefits from CPAP when you use it every time that you sleep. This will also make it easier for your body to adjust to the treatment.    4. Correction  The first machine and mask that you try may not be the best ones for you. Work with your sleep doctor and your CPAP supplier to make corrections to your  "equipment selection. Ask about trying a different type of machine or mask if you have ongoing problems. Make sure that your mask is a good fit and learn to use your equipment properly.    5. Challenge  Tell a family member or close friend to ask you each morning if you used your CPAP the previous night. Have someone to challenge you to give it your best effort.    6. Connection   Your adjustment to CPAP will be easier if you are able to connect with others who use the same treatment. Ask your sleep doctor if there is a support group in your area for people who have sleep apnea, or look for one on the Internet.  7. Comfort   Increase your level of comfort by using a saline spray, decongestant or heated humidifier if CPAP irritates your nose, mouth or throat. Use your unit's \"ramp\" setting to slowly get used to the air pressure level. There may be soft pads you can buy that will fit over your mask straps. Look on www.CPAP.com for accessories that can help make CPAP use more comfortable.  8. Cleaning   Clean your mask, tubing and headgear on a regular basis. Put this time in your schedule so that you don't forget to do it. Check and replace the filters for your CPAP unit and humidifier.    9. Completion   Although you are never finished with CPAP therapy, you should reward yourself by celebrating the completion of your first month of treatment. Expect this first month to be your hardest period of adjustment. It will involve some trial and error as you find the machine, mask and pressure settings that are right for you.    10. Continuation  After your first month of treatment, continue to make a daily commitment to use your CPAP all night, every night and for every nap.    CPAP-Tips to starting with success:  Begin using your CPAP for short periods of time during the day while you watch TV or read.    Use CPAP every night and for every nap. Using it less often reduces the health benefits and makes it harder for your " body to get used to it.    Newer CPAP models are virtually silent; however, if you find the sound of your CPAP machine to be bothersome, place the unit under your bed to dampen the sound.     Make small adjustments to your mask, tubing, straps and headgear until you get the right fit. Tightening the mask may actually worsen the leak.  If it leaves significant marks on your face or irritates the bridge of your nose, it may not be the best mask for you.  Speak with the person who supplied the mask and consider trying other masks. Insurances will allow you to try different masks during the first month of starting CPAP.  Insurance also covers a new mask, hose and filter about every 6 months.    Use a saline nasal spray to ease mild nasal congestion. Neti-Pot or saline nasal rinses may also help. Nasal gel sprays can help reduce nasal dryness.  Biotene mouthwash can be helpful to protect your teeth if you experience frequent dry mouth.  Dry mouth may be a sign of air escaping out of your mouth or out of the mask in the case of a full face mask.  Speak with your provider if you expect that is the case.     Take a nasal decongestant to relieve more severe nasal or sinus congestion.  Do not use Afrin (oxymetazoline) nasal spray more than 3 days in a row.  Speak with your sleep doctor if your nasal congestion is chronic.    Use a heated humidifier that fits your CPAP model to enhance your breathing comfort. Adjust the heat setting up if you get a dry nose or throat, down if you get condensation in the hose or mask.  Position the CPAP lower than you so that any condensation in the hose drains back into the machine rather than towards the mask.    Try a system that uses nasal pillows if traditional masks give you problems.    Clean your mask, tubing and headgear once a week. Make sure the equipment dries fully.    Regularly check and replace the filters for your CPAP unit and humidifier.    Work closely with your sleep  provider and your CPAP supplier to make sure that you have the machine, mask and air pressure setting that works best for you. It is better to stop using it and call your provider to solve problems than to lay awake all night frustrated with the device.    BESIDES CPAP, WHAT OTHER THERAPIES ARE THERE?    Postioning devices if you only have the snoring or apnea while on your back    Dental devices if your condition is mild    Nasal valves may be effective though experience is limited    Weight loss if you are overweight    Surgery in limited cases where devices are not acceptable or there are problems with structures in the nose and throat  If treated with one of these alternative options, further evaluation is necessary to ensure that the therapy is effective. This may require some form of repeat testing.      Healthy Lifestyle:  Healthy diet, exercise and limit alcohol: Not only will excessive alcohol increase your weight over time, but it irritates the throat tissues and make them swell, shrinking the airway and causing snoring. Drinking alcohol should be limited and stopped within 3-4 hours before going to bed.   Stop smoking: (Red swollen throat, heat, nicotine), also irritates and swells the airway, among numerous other negative health consequences.    Positioning Device  This example shows a pillow that straps around the waist. It may be appropriate for those whose sleep study shows milder sleep apnea that occurs primarily when lying flat on one's back. Preliminary studies have shown benefit but effectiveness at home should be verified.                      Oral Appliance  These are examples of two of many custom-made devices that are more likely to work in mild sleep apnea   Oral appliances are dental mouth pieces that fit very much like a sports mouth guards or removable orthodontic retainers. They are used to treat snoring and obstructive sleep apnea . The device prevents the airway from collapsing by  either supporting the jaw in a forward position. Since oral appliances are non-invasive and easy to use, they may be considered as an early treatment option. Oral appliance therapy (OAT) involves the customization, selection, fabrication, fitting, adjustments and long-term follow-up care.  Custom made oral appliances are proven to be more effective than over-the-counter devices. Therefore, the over-the-counter devices are recommended not to be used as a screening tool nor as a therapeutic option.     Who gets a dental device?  Oral appliance therapy can be used as an alternative to CPAP therapy for the treatment of mild to moderate sleep apnea and for those patients who prefer OAT to CPAP. Oral appliance therapy is a first line therapy for the treatment of primary snoring. Additionally, OAT is an option for those that cannot tolerate CPAP as therapy or who have experienced insufficient surgical results.                 Possible side effects?  Frequent but minor side effects include: excessive salivation, dry mouth, discomfort of teeth and jaw and temporary changes in the patient s bite.  Potential complications include: jaw pain, permanent occlusal changes and TMJ symptoms.  The above mentioned side effects and complications can be recognized and managed by dentists trained in dental sleep medicine.   Finding a dentist that practices dental sleep medicine  Specific training is available through the American Academy of Dental Sleep Medicine for dentists interested in working in the field of sleep. To find a dentist who is educated in the field of sleep and the use of oral appliances, near you, visit the Web site of the American Academy of Dental Sleep Medicine; also see http://www.accpstorage.org/newOrganization/patients/oralAppliances.pdf   To search for a dentist certified in these practices:  Http://aadsm.org/FindADentist.aspx?1  Nasal Valves              Nasal valves may be an option for mild apnea if other  options are not well tolerated. The efficacy of these devices is generally less than CPAP or oral appliances.They may not be effective if you have frequent nasal congestion or have difficulty breathing through your nose.   Weight Loss:    Weight loss decreases severity of sleep apnea in most people with obesity. For those with mild obesity who have developed snoring with weight gain, even 15-30 pound weight loss can improve and occasionally eliminate sleep apnea.  Structured and life-long dietary and health habits are necessary to lose weight and keep healthier weight levels.     Though there are significant health benefits from weight loss, long-term weight loss is very difficult to achieve- studies show success with dietary management in less than 10% of people. In addition, substantial weight loss may require years of dietary control and may be difficult if patients have severe obesity. In these cases, surgical management may be considered.    If you are interested in methods for weight loss, you should review the options discussed at the National Institutes of Health patient information sites:     http://win.niddk.nih.gov/publications/index.htm  http:/www.health.nih.gov/topic/WeightLossDieting    Bariatric programs offer counseling in all methods of weight loss:    Http:/www.[facility].org/Specialties/WeightLossSurgeryandMedicalMgmt/htm    Your BMI is Body mass index is 35.12 kg/(m^2).    Weight management plan: Patient was referred to their PCP to discuss a diet and exercise plan.    Body mass index (BMI) is one way to tell whether you are at a healthy weight, overweight, or obese. It measures your weight in relation to your height.  A BMI of 18.5 to 24.9 is in the healthy range. A person with a BMI of 25 to 29.9 is considered overweight, and someone with a BMI of 30 or greater is considered obese.  Another way to find out if you are at risk for health problems caused by overweight and obesity is to  measure your waist. If you are a woman and your waist is more than 35 inches, or if you are a man and your waist is more than 40 inches, your risk of disease may be higher.  More than two-thirds of American adults are considered overweight or obese. Being overweight or obese increases the risk for further weight gain.  Excess weight may lead to heart disease and diabetes. Creating and following plans for healthy eating and physical activity may help you improve your health.    Methods for maintaining or losing weight.    Weight control is part of healthy lifestyle and includes exercise, emotional health, and healthy eating habits.  Careful eating habits lifelong is the mainstay of weight control.  Though there are significant health benefits from weight loss, long-term weight loss with diet alone may be very difficult to achieve- studies show long-term success with dietary management in less than 10% of people. Attaining a healthy weight may be especially difficult to achieve in those with severe obesity. In some cases, medications, devices and surgical management might be considered.    What can you do?    If you are overweight or obese and are interested in methods for weight loss, you should discuss this with your provider. In addition, we recommend that you review healthy life styles and methods for weight loss available through the National Institutes of Health patient information sites:     http://win.niddk.nih.gov/publications/index.htm      Surgery:  There are a number of surgeries that have been attempted to treat apnea. In general, surgical options are usually reserved for cases in which there is a physical abnormality contributing to obstruction or other treatment options are ineffective or not tolerated. Most surgical options are either unreliable or quite invasive. One of the more common procedures is:  Uvulopalatopharyngoplasty: In this procedure, the uvula (the finger-like tissue that hangs in the  "back of the throat), part of the soft palate (the tissue that the uvula is attached to), and sometimes the tonsils or adenoids are removed. The efficacy of this surgery is around 30-50% .  After surgery, complications may include:  Sleepiness and sleep apnea related to post-surgery medication   Swelling, infection and bleeding   A sore throat and/or difficulty swallowing   Drainage of secretions into the nose and a nasal quality to the voice. English language speech does not seem to be affected by this surgery.   Narrowing of the airway in the nose and throat (hence constricting breathing) snoring and even iatrogenically caused sleep apnea. By cutting the tissues, excess scar tissue can \"tighten\" the airway and make it even smaller than it was before UPPP.  Patients who have had the uvula removed will become unable to correctly speak [place] or any other language that has a uvular 'r' phoneme.    Surgeries to help resolve nasal congestion may help reduce the severity of apnea slightly. Nasal congestion does not cause apnea on its own, so these surgeries are usually not performed just for BUCK.  They may be worth considering if the nasal congestion is significantly bothersome independent of apnea.        "

## 2017-10-11 NOTE — MR AVS SNAPSHOT
After Visit Summary   10/11/2017    Juan Jose Razo    MRN: [id number]           Patient Information     Date Of Birth          1950        Visit Information        Provider Department      10/11/2017 8:00 AM Ziyad Blount MD [facility], [place], Sleep Study        Today's Diagnoses     Snoring    -  1    Observed sleep apnea          Care Instructions      Your BMI is Body mass index is 35.12 kg/(m^2).  Weight management is a personal decision.  If you are interested in exploring weight loss strategies, the following discussion covers the approaches that may be successful. Body mass index (BMI) is one way to tell whether you are at a healthy weight, overweight, or obese. It measures your weight in relation to your height.  A BMI of 18.5 to 24.9 is in the healthy range. A person with a BMI of 25 to 29.9 is considered overweight, and someone with a BMI of 30 or greater is considered obese. More than two-thirds of American adults are considered overweight or obese.  Being overweight or obese increases the risk for further weight gain. Excess weight may lead to heart disease and diabetes.  Creating and following plans for healthy eating and physical activity may help you improve your health.  Weight control is part of healthy lifestyle and includes exercise, emotional health, and healthy eating habits. Careful eating habits lifelong are the mainstay of weight control. Though there are significant health benefits from weight loss, long-term weight loss with diet alone may be very difficult to achieve- studies show long-term success with dietary management in less than 10% of people. Attaining a healthy weight may be especially difficult to achieve in those with severe obesity. In some cases, medications, devices and surgical management might be considered.  What can you do?  If you are overweight or obese and are interested in methods for weight loss, you should discuss this with your provider.  "    Consider reducing daily calorie intake by 500 calories.     Keep a food journal.     Avoiding skipping meals, consider cutting portions instead.    Diet combined with exercise helps maintain muscle while optimizing fat loss. Strength training is particularly important for building and maintaining muscle mass. Exercise helps reduce stress, increase energy, and improves fitness. Increasing exercise without diet control, however, may not burn enough calories to loose weight.       Start walking three days a week 10-20 minutes at a time    Work towards walking thirty minutes five days a week     Eventually, increase the speed of your walking for 1-2 minutes at time    In addition, we recommend that you review healthy lifestyles and methods for weight loss available through the National Institutes of Health patient information sites:  http://win.niddk.nih.gov/publications/index.htm    And look into health and wellness programs that may be available through your health insurance provider, employer, local community center, or kulwinder club.    Weight management plan: Patient was referred to their PCP to discuss a diet and exercise plan.    MY INFORMATION ON SLEEP APNEA-  Juan Jose Razo    DOCTOR : Ziyad Blount  SLEEP CENTER :      MY CONTACT NUMBER:   [facility]  [phone]  [facility]   [phone]  [facility]   [phone]      [facility]  [phone]  [facility]   [phone]      Alfonso Points:  1. What is Obstructive Sleep Apnea (BUCK)? BUCK is the most common type of sleep apnea. Apnea literally means, \"without breath.\" It is characterized by repetitive pauses in breathing, despite continued effort to breathe, and is usually associated with a reduction in blood oxygen saturation. Apneas can last 10 to over 60 seconds. It is caused by narrowing or collapse of the upper airway as muscles relax during " sleep.   2. What are the consequences of BUCK? Symptoms include: daytime sleepiness- possibly increasing the risk of falling asleep while driving, unrefreshing/restless sleep, snoring, insomnia, waking frequently to urinate, waking with heartburn or reflux, reduced concentration and memory, and morning headaches. Other health consequences may include development of high blood pressure. Untreated BUCK also can contribute to heart disease, stroke and diabetes.   3. What are the treatment options? In most situations, sleep apnea is a lifelong disease that must be managed with daily therapy. Continuous Positive Airway (CPAP) is the most reliable treatment. A mouthguard to hold your jaw forward is usually the next most reliable option. Other options include postioning devices (to keep you off your back), nasal valves, tongue retaining device, weight loss, surgery. There is more detail about these options toward the end of this document.  4. What are the most important things to remember about using CPAP?     WHERE CAN I FIND MORE INFORMATION?    American Academy of Sleep Medicine Patient information on sleep disorders:  http://yoursleep.aasmnet.org    CPAP -  WHY AND HOW?                 Continuous positive airway pressure, or CPAP, is the most effective treatment for obstructive sleep apnea. It works by blowing room air, through a mask, to hold your throat open. A decision to use CPAP is a major step forward in the pursuit of a healthier life. The successful use of CPAP will help you breathe easier, sleep better and live healthier. Using CPAP can be a positive experience if you keep these wallace points in mind:  1. Commitment  CPAP is not a quick fix for your problem. It involves a long-term commitment to improve your sleep and your health.    2. Communication  Stay in close communication with both your sleep doctor and your CPAP supplier. Ask lots of questions and seek help when you need it.    3. Consistency  Use CPAP all  "night, every night and for every nap. You will receive the maximum health benefits from CPAP when you use it every time that you sleep. This will also make it easier for your body to adjust to the treatment.    4. Correction  The first machine and mask that you try may not be the best ones for you. Work with your sleep doctor and your CPAP supplier to make corrections to your equipment selection. Ask about trying a different type of machine or mask if you have ongoing problems. Make sure that your mask is a good fit and learn to use your equipment properly.    5. Challenge  Tell a family member or close friend to ask you each morning if you used your CPAP the previous night. Have someone to challenge you to give it your best effort.    6. Connection   Your adjustment to CPAP will be easier if you are able to connect with others who use the same treatment. Ask your sleep doctor if there is a support group in your area for people who have sleep apnea, or look for one on the Internet.  7. Comfort   Increase your level of comfort by using a saline spray, decongestant or heated humidifier if CPAP irritates your nose, mouth or throat. Use your unit's \"ramp\" setting to slowly get used to the air pressure level. There may be soft pads you can buy that will fit over your mask straps. Look on www.CPAP.com for accessories that can help make CPAP use more comfortable.  8. Cleaning   Clean your mask, tubing and headgear on a regular basis. Put this time in your schedule so that you don't forget to do it. Check and replace the filters for your CPAP unit and humidifier.    9. Completion   Although you are never finished with CPAP therapy, you should reward yourself by celebrating the completion of your first month of treatment. Expect this first month to be your hardest period of adjustment. It will involve some trial and error as you find the machine, mask and pressure settings that are right for you.    10. Continuation  After " your first month of treatment, continue to make a daily commitment to use your CPAP all night, every night and for every nap.    CPAP-Tips to starting with success:  Begin using your CPAP for short periods of time during the day while you watch TV or read.    Use CPAP every night and for every nap. Using it less often reduces the health benefits and makes it harder for your body to get used to it.    Newer CPAP models are virtually silent; however, if you find the sound of your CPAP machine to be bothersome, place the unit under your bed to dampen the sound.     Make small adjustments to your mask, tubing, straps and headgear until you get the right fit. Tightening the mask may actually worsen the leak.  If it leaves significant marks on your face or irritates the bridge of your nose, it may not be the best mask for you.  Speak with the person who supplied the mask and consider trying other masks. Insurances will allow you to try different masks during the first month of starting CPAP.  Insurance also covers a new mask, hose and filter about every 6 months.    Use a saline nasal spray to ease mild nasal congestion. Neti-Pot or saline nasal rinses may also help. Nasal gel sprays can help reduce nasal dryness.  Biotene mouthwash can be helpful to protect your teeth if you experience frequent dry mouth.  Dry mouth may be a sign of air escaping out of your mouth or out of the mask in the case of a full face mask.  Speak with your provider if you expect that is the case.     Take a nasal decongestant to relieve more severe nasal or sinus congestion.  Do not use Afrin (oxymetazoline) nasal spray more than 3 days in a row.  Speak with your sleep doctor if your nasal congestion is chronic.    Use a heated humidifier that fits your CPAP model to enhance your breathing comfort. Adjust the heat setting up if you get a dry nose or throat, down if you get condensation in the hose or mask.  Position the CPAP lower than you so  that any condensation in the hose drains back into the machine rather than towards the mask.    Try a system that uses nasal pillows if traditional masks give you problems.    Clean your mask, tubing and headgear once a week. Make sure the equipment dries fully.    Regularly check and replace the filters for your CPAP unit and humidifier.    Work closely with your sleep provider and your CPAP supplier to make sure that you have the machine, mask and air pressure setting that works best for you. It is better to stop using it and call your provider to solve problems than to lay awake all night frustrated with the device.    BESIDES CPAP, WHAT OTHER THERAPIES ARE THERE?    Postioning devices if you only have the snoring or apnea while on your back    Dental devices if your condition is mild    Nasal valves may be effective though experience is limited    Weight loss if you are overweight    Surgery in limited cases where devices are not acceptable or there are problems with structures in the nose and throat  If treated with one of these alternative options, further evaluation is necessary to ensure that the therapy is effective. This may require some form of repeat testing.      Healthy Lifestyle:  Healthy diet, exercise and limit alcohol: Not only will excessive alcohol increase your weight over time, but it irritates the throat tissues and make them swell, shrinking the airway and causing snoring. Drinking alcohol should be limited and stopped within 3-4 hours before going to bed.   Stop smoking: (Red swollen throat, heat, nicotine), also irritates and swells the airway, among numerous other negative health consequences.    Positioning Device  This example shows a pillow that straps around the waist. It may be appropriate for those whose sleep study shows milder sleep apnea that occurs primarily when lying flat on one's back. Preliminary studies have shown benefit but effectiveness at home should be verified.                       Oral Appliance  These are examples of two of many custom-made devices that are more likely to work in mild sleep apnea   Oral appliances are dental mouth pieces that fit very much like a sports mouth guards or removable orthodontic retainers. They are used to treat snoring and obstructive sleep apnea . The device prevents the airway from collapsing by either supporting the jaw in a forward position. Since oral appliances are non-invasive and easy to use, they may be considered as an early treatment option. Oral appliance therapy (OAT) involves the customization, selection, fabrication, fitting, adjustments and long-term follow-up care.  Custom made oral appliances are proven to be more effective than over-the-counter devices. Therefore, the over-the-counter devices are recommended not to be used as a screening tool nor as a therapeutic option.     Who gets a dental device?  Oral appliance therapy can be used as an alternative to CPAP therapy for the treatment of mild to moderate sleep apnea and for those patients who prefer OAT to CPAP. Oral appliance therapy is a first line therapy for the treatment of primary snoring. Additionally, OAT is an option for those that cannot tolerate CPAP as therapy or who have experienced insufficient surgical results.                 Possible side effects?  Frequent but minor side effects include: excessive salivation, dry mouth, discomfort of teeth and jaw and temporary changes in the patient s bite.  Potential complications include: jaw pain, permanent occlusal changes and TMJ symptoms.  The above mentioned side effects and complications can be recognized and managed by dentists trained in dental sleep medicine.   Finding a dentist that practices dental sleep medicine  Specific training is available through the American Academy of Dental Sleep Medicine for dentists interested in working in the field of sleep. To find a dentist who is educated in the field of sleep and  the use of oral appliances, near you, visit the Web site of the American Academy of Dental Sleep Medicine; also see http://www.accpstorage.org/newOrganization/patients/oralAppliances.pdf   To search for a dentist certified in these practices:  Http://aadsm.org/FindADentist.aspx?1  Nasal Valves              Nasal valves may be an option for mild apnea if other options are not well tolerated. The efficacy of these devices is generally less than CPAP or oral appliances.They may not be effective if you have frequent nasal congestion or have difficulty breathing through your nose.   Weight Loss:    Weight loss decreases severity of sleep apnea in most people with obesity. For those with mild obesity who have developed snoring with weight gain, even 15-30 pound weight loss can improve and occasionally eliminate sleep apnea.  Structured and life-long dietary and health habits are necessary to lose weight and keep healthier weight levels.     Though there are significant health benefits from weight loss, long-term weight loss is very difficult to achieve- studies show success with dietary management in less than 10% of people. In addition, substantial weight loss may require years of dietary control and may be difficult if patients have severe obesity. In these cases, surgical management may be considered.    If you are interested in methods for weight loss, you should review the options discussed at the National Institutes of Health patient information sites:     http://win.niddk.nih.gov/publications/index.htm  http:/www.health.nih.gov/topic/WeightLossDieting    Bariatric programs offer counseling in all methods of weight loss:    Http:/www.[facility].org/Specialties/WeightLossSurgeryandMedicalMgmt/htm    Your BMI is Body mass index is 35.12 kg/(m^2).    Weight management plan: Patient was referred to their PCP to discuss a diet and exercise plan.    Body mass index (BMI) is one way to tell whether you are at a  healthy weight, overweight, or obese. It measures your weight in relation to your height.  A BMI of 18.5 to 24.9 is in the healthy range. A person with a BMI of 25 to 29.9 is considered overweight, and someone with a BMI of 30 or greater is considered obese.  Another way to find out if you are at risk for health problems caused by overweight and obesity is to measure your waist. If you are a woman and your waist is more than 35 inches, or if you are a man and your waist is more than 40 inches, your risk of disease may be higher.  More than two-thirds of American adults are considered overweight or obese. Being overweight or obese increases the risk for further weight gain.  Excess weight may lead to heart disease and diabetes. Creating and following plans for healthy eating and physical activity may help you improve your health.    Methods for maintaining or losing weight.    Weight control is part of healthy lifestyle and includes exercise, emotional health, and healthy eating habits.  Careful eating habits lifelong is the mainstay of weight control.  Though there are significant health benefits from weight loss, long-term weight loss with diet alone may be very difficult to achieve- studies show long-term success with dietary management in less than 10% of people. Attaining a healthy weight may be especially difficult to achieve in those with severe obesity. In some cases, medications, devices and surgical management might be considered.    What can you do?    If you are overweight or obese and are interested in methods for weight loss, you should discuss this with your provider. In addition, we recommend that you review healthy life styles and methods for weight loss available through the National Institutes of Health patient information sites:     http://win.niddk.nih.gov/publications/index.htm      Surgery:  There are a number of surgeries that have been attempted to treat apnea. In general, surgical options  "are usually reserved for cases in which there is a physical abnormality contributing to obstruction or other treatment options are ineffective or not tolerated. Most surgical options are either unreliable or quite invasive. One of the more common procedures is:  Uvulopalatopharyngoplasty: In this procedure, the uvula (the finger-like tissue that hangs in the back of the throat), part of the soft palate (the tissue that the uvula is attached to), and sometimes the tonsils or adenoids are removed. The efficacy of this surgery is around 30-50% .  After surgery, complications may include:  Sleepiness and sleep apnea related to post-surgery medication   Swelling, infection and bleeding   A sore throat and/or difficulty swallowing   Drainage of secretions into the nose and a nasal quality to the voice. English language speech does not seem to be affected by this surgery.   Narrowing of the airway in the nose and throat (hence constricting breathing) snoring and even iatrogenically caused sleep apnea. By cutting the tissues, excess scar tissue can \"tighten\" the airway and make it even smaller than it was before UPPP.  Patients who have had the uvula removed will become unable to correctly speak Syriac or any other language that has a uvular 'r' phoneme.    Surgeries to help resolve nasal congestion may help reduce the severity of apnea slightly. Nasal congestion does not cause apnea on its own, so these surgeries are usually not performed just for BUCK.  They may be worth considering if the nasal congestion is significantly bothersome independent of apnea.                Follow-ups after your visit        Follow-up notes from your care team     Return in about 1 week (around 10/18/2017) for to discuss sleep study.      Your next 10 appointments already scheduled     Oct 12, 2017  2:00 PM CDT   HST  with SLEEP STUDY RM 7   [facility], [place], Sleep Study ([facility])    [address] [facility] [address]   [phone]            Oct 13, 2017 10:30 AM CDT   HST Drop Off with DME SCHEDULE   [facility]    [address] [facility] [address]   [phone]            Oct 24, 2017  9:20 AM CDT   Return Sleep Patient with Ziyad Blount MD   [facility] [place], Sleep Study ([facility])    [address] [facility] [address] [phone]              Future tests that were ordered for you today     Open Future Orders        Priority Expected Expires Ordered    HST-Home Sleep Apnea Test Routine  4/12/2018 10/11/2017            Who to contact     If you have questions or need follow up information about today's clinic visit or your schedule please contact [facility] directly at [phone].  Normal or non-critical lab and imaging results will be communicated to you by [facility], letter or phone within 4 business days after the clinic has received the results. If you do not hear from us within 7 days, please contact the clinic through [facility] or phone. If you have a critical or abnormal lab result, we will notify you by phone as soon as possible.  Submit refill requests through [facility] or call your pharmacy and they will forward the refill request to us. Please allow 3 business days for your refill to be completed.          Additional Information About Your Visit        [facility]12 Information     [facility] gives you secure access to your electronic health record. If you see a primary care provider, you can also send messages to your care team and make appointments. If you have questions, please call your primary care clinic.  If you do not have a primary care provider, please call [phone] and they will assist you.        Care EveryWhere ID     This is your Care EveryWhere ID. This could be used by other organizations to access your  "[facility] medical records  [id number]        Your Vitals Were     Pulse Respirations Height Pulse Oximetry BMI (Body Mass Index)       75 18 1.905 m (6' 3\") 96% 35.12 kg/m2        Blood Pressure from Last 3 Encounters:   10/11/17 126/86   06/26/17 120/88   02/09/17 121/83    Weight from Last 3 Encounters:   10/11/17 127.5 kg (281 lb)   06/26/17 124.2 kg (273 lb 12.8 oz)   09/02/16 127.5 kg (281 lb)               Primary Care Provider Office Phone # Fax #    Alphonso Ceasar Grover [phone]       [address] [address]        Equal Access to Services     YUNIOR FRASER : Hadii dawn sánchez hadasho Soomaali, waaxda luqadaha, qaybta kaalmada adeegyada, jumana morse hayjodie kelly . So [facility] [phone].    ATENCIÓN: Si habla español, tiene a lo disposición servicios gratuitos de asistencia lingüística. Fabienne al [phone].    We comply with applicable federal civil rights laws and [place] laws. We do not discriminate on the basis of race, color, national origin, age, disability, sex, sexual orientation, or gender identity.            Thank you!     Thank you for choosing [facility], SLEEP STUDY  for your care. Our goal is always to provide you with excellent care. Hearing back from our patients is one way we can continue to improve our services. Please take a few minutes to complete the written survey that you may receive in the mail after your visit with us. Thank you!             Your Updated Medication List - Protect others around you: Learn how to safely use, store and throw away your medicines at www.disposemymeds.org.          This list is accurate as of: 10/11/17  9:45 AM.  Always use your most recent med list.                   Brand Name Dispense Instructions for use Diagnosis    amLODIPine 5 MG tablet    NORVASC    180 tablet    Take 2 tablets (10 mg) by mouth daily    Hypertension goal BP (blood pressure) < 140/90       CENTRUM SILVER ULTRA MENS PO     "       cholecalciferol 1000 UNIT tablet    vitamin D     Take by mouth daily        clotrimazole 1 % cream    LOTRIMIN    30 g    Apply topically 2 times daily    Tinea corporis       Desloratadine-Pseudoephedrine 5-240 MG Tb24    CLARINEX-D 24 HOUR    30 tablet    Take 1 tablet by mouth daily as needed    Acute seasonal allergic rhinitis due to pollen       hydrocortisone 0.2 % cream    WESTCORT    60 g    Apply topically 2 times daily    Intertrigo       hydrocortisone 2.5 % ointment     30 g    Apply topically 2 times daily    Tinea corporis       LANsoprazole 30 MG CR capsule    PREVACID    90 capsule    Take 1 capsule (30 mg) by mouth daily    Gastroesophageal reflux disease without esophagitis       metoprolol 100 MG 24 hr tablet    TOPROL-XL    90 tablet    Take 1 tablet (100 mg) by mouth daily    Hypertension goal BP (blood pressure) < 140/90       ranitidine 300 MG tablet    ZANTAC    90 tablet    Take 1 tablet (300 mg) by mouth At Bedtime    Gastroesophageal reflux disease without esophagitis       * sildenafil 100 MG tablet    VIAGRA    6 tablet    Take 0.5-1 tablets ( mg) by mouth daily as needed for erectile dysfunction Take 30 min to 4 hours before intercourse.  Never use with nitroglycerin, terazosin or doxazosin.    ED (erectile dysfunction)       * sildenafil 100 MG tablet    VIAGRA    6 tablet    Take 0.5-1 tablets ( mg) by mouth daily as needed for erectile dysfunction Take 30 min to 4 hours before intercourse.  Never use with nitroglycerin, terazosin or doxazosin.    Erectile dysfunction due to arterial insufficiency       * Notice:  This list has 2 medication(s) that are the same as other medications prescribed for you. Read the directions carefully, and ask your doctor or other care provider to review them with you.

## 2017-10-11 NOTE — PROGRESS NOTES
"Sleep Consultation Note:    Date on this visit: 10/11/2017    Juan Jose Razo is sent by Dr. Grover for a sleep consultation regarding possible BUCK.    Primary Physician: Alphonso Grover     CC:  \"Well my wife has been wanting to get me in for years and years because noticed my apnea.\"    Juan Jose Razo is a 66 year old male with PMH HTN, GERD, snoring who reported a long history of snoring and indicated his wife has noticed pauses in his breathing.  He has not had a previous sleep study.    Sleep Disordered Breathing  Juan Jose does report snoring, snort arousals, witnessed apneas, dry mouth,  non-refreshing sleep, daytime sleepiness/fatigue. Juan Jose said he has had stable weight the past few years.    Sleep Schedule/Sleep Complaints  He does not report difficulty with falling asleep. Juan Jose goes to bed at 11 PM, and it usually takes 30 minutes to fall asleep.    Juan Jose does not report restlessness feelings in the legs at night.    He does not complain of spontaneous leg movements/jerks in the middle of the night.    Patient does not report chronic pain, ruminating thoughts, stress/anxiety, depression, which affect the onset of sleep.    Patient does not use electronics in bed -   Patient does not have a regular bed partner.  Patient sleeps on his sides.  Patient does have cats in the bedroom at night during sleep.  He does not use a sleep aid now or in past.    He does not complain of difficulty with staying asleep.  He wakes up 3 times throughout the night.  Night time awakenings occurs due to need to urinate.    Patient does not report chronic pain, ruminating thoughts, stress/anxiety, depression, which affects the maintenance of sleep.  After awakening, He is able to fall back asleep after 10 minutes.    He wakes up at 7 AM, with  an alarm.    Patient is morning person.  He would naturally to go to sleep at 11 and get up at 7am.    Sleep Behaviors  He denies any cataplexy, sleep paralysis, sleep " hallucinations.    He denies any night time behaviors - sleep walking, sleep talking, sleep eating.    He does not report history of acting out dreams.    Patient denies any injury to oneself or others while sleeping.    Daytime Functioning  Aminata does not generally nap.    He does not doze off during the day -     He denies drowsy driving.    Patient's [place] Sleepiness score is 12/24.     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    He does drink caffeine, about 2-3 drinks/day. Last caffeine intake is not within 6 hours of bed time.  He drinks alcohol, 1-2 drinks/week.  Does  not use alcohol as a sleep aid.  Patient is a never smoker.   Denies any secondhand exposure currently.  Patient does not use drugs.  No future surgeries planned.  Labs/Studies:    Results for AMINATA ALARCON (MRN [id number]) as of 10/11/2017 08:52   Ref. Range 6/26/2017 14:31   Sodium Latest Ref Range: 133 - 144 mmol/L 137   Potassium Latest Ref Range: 3.4 - 5.3 mmol/L 4.7   Chloride Latest Ref Range: 94 - 109 mmol/L 104   Carbon Dioxide Latest Ref Range: 20 - 32 mmol/L 26   Urea Nitrogen Latest Ref Range: 7 - 30 mg/dL 18   Creatinine Latest Ref Range: 0.66 - 1.25 mg/dL 1.15   GFR Estimate Latest Ref Range: >60 mL/min/1.7m2 64   GFR Estimate If Black Latest Ref Range: >60 mL/min/1.7m2 77   Calcium Latest Ref Range: 8.5 - 10.1 mg/dL 9.1   Anion Gap Latest Ref Range: 3 - 14 mmol/L 7   Albumin Latest Ref Range: 3.4 - 5.0 g/dL 4.1   Protein Total Latest Ref Range: 6.8 - 8.8 g/dL 8.3   Bilirubin Total Latest Ref Range: 0.2 - 1.3 mg/dL 1.4 (H)   Alkaline Phosphatase Latest Ref Range: 40 - 150 U/L 87   ALT Latest Ref Range: 0 - 70 U/L 45   AST Latest Ref Range: 0 - 45 U/L 35   Cholesterol Latest Ref Range: <200 mg/dL 181   HDL Cholesterol Latest Ref Range: >39 mg/dL 42   LDL Cholesterol Calculated Latest Ref Range: <100 mg/dL 104 (H)   Non HDL Cholesterol Latest Ref Range: <130  mg/dL 139 (H)   Triglycerides Latest Ref Range: <150 mg/dL 174 (H)   Glucose Latest Ref Range: 70 - 99 mg/dL 89   Results for AMINATA ALARCON (MRN [id number]) as of 10/11/2017 08:52   Ref. Range 8/28/2016 12:34   WBC Latest Ref Range: 4.0 - 11.0 10e9/L 7.1   Hemoglobin Latest Ref Range: 13.3 - 17.7 g/dL 16.5   Hematocrit Latest Ref Range: 40.0 - 53.0 % 47.2   Platelet Count Latest Ref Range: 150 - 450 10e9/L 197   RBC Count Latest Ref Range: 4.4 - 5.9 10e12/L 5.27   MCV Latest Ref Range: 78 - 100 fl 90   MCH Latest Ref Range: 26.5 - 33.0 pg 31.3   MCHC Latest Ref Range: 31.5 - 36.5 g/dL 35.0   RDW Latest Ref Range: 10.0 - 15.0 % 12.8   Diff Method Unknown Automated Method   % Neutrophils Latest Units: % 49.6   % Lymphocytes Latest Units: % 30.7   % Monocytes Latest Units: % 13.1   % Eosinophils Latest Units: % 6.3   % Basophils Latest Units: % 0.3   Absolute Neutrophil Latest Ref Range: 1.6 - 8.3 10e9/L 3.5   Absolute Lymphocytes Latest Ref Range: 0.8 - 5.3 10e9/L 2.2   Absolute Monocytes Latest Ref Range: 0.0 - 1.3 10e9/L 0.9   Absolute Eosinophils Latest Ref Range: 0.0 - 0.7 10e9/L 0.5   Absolute Basophils Latest Ref Range: 0.0 - 0.2 10e9/L 0.0     Allergies:    No Known Allergies    Medications:    Current Outpatient Prescriptions   Medication Sig Dispense Refill     Desloratadine-Pseudoephedrine (CLARINEX-D 24 HOUR) 5-240 MG TB24 Take 1 tablet by mouth daily as needed 30 tablet 2     sildenafil (VIAGRA) 100 MG cap/tab Take 0.5-1 tablets ( mg) by mouth daily as needed for erectile dysfunction Take 30 min to 4 hours before intercourse.  Never use with nitroglycerin, terazosin or doxazosin. 6 tablet 1     clotrimazole (LOTRIMIN) 1 % cream Apply topically 2 times daily 30 g 1     ranitidine (ZANTAC) 300 MG tablet Take 1 tablet (300 mg) by mouth At Bedtime 90 tablet 3     LANsoprazole (PREVACID) 30 MG CR capsule Take 1 capsule (30 mg) by mouth daily 90 capsule 3     amLODIPine (NORVASC) 5 MG tablet Take 2  tablets (10 mg) by mouth daily 180 tablet 3     metoprolol (TOPROL-XL) 100 MG 24 hr tablet Take 1 tablet (100 mg) by mouth daily 90 tablet 3     hydrocortisone (WESTCORT) 0.2 % cream Apply topically 2 times daily 60 g 1     hydrocortisone 2.5 % ointment Apply topically 2 times daily 30 g 1     cholecalciferol (VITAMIN D3) 1000 UNIT tablet Take by mouth daily       Multiple Vitamins-Minerals (CENTRUM SILVER ULTRA MENS PO)        sildenafil (VIAGRA) 100 MG tablet Take 0.5-1 tablets ( mg) by mouth daily as needed for erectile dysfunction Take 30 min to 4 hours before intercourse.  Never use with nitroglycerin, terazosin or doxazosin. 6 tablet 3       Problem List:  Patient Active Problem List    Diagnosis Date Noted     Multiple benign nevi 09/06/2015     Priority: Medium     Seborrheic keratoses 09/06/2015     Priority: Medium     Skin cancer screening 09/06/2015     Priority: Medium     Skin exam, screening for cancer 10/24/2013     Priority: Medium     Phlebitis and thrombophlebitis of femoral vein (deep) (superficial), left 02/01/2013     Priority: Medium     IMO update changed this record. Please review for accuracy       Advanced directives, counseling/discussion 01/23/2013     Priority: Medium     Patient states has Advance Directive and will bring in a copy to clinic. 1/23/2013          CARDIOVASCULAR SCREENING; LDL GOAL LESS THAN 130 03/06/2012     Priority: Medium     Hypertension goal BP (blood pressure) < 140/90 03/06/2012     Priority: Medium     GERD (gastroesophageal reflux disease) 03/06/2012     Priority: Medium     Seasonal allergic rhinitis 03/06/2012     Priority: Medium        Past Medical/Surgical History:  Past Medical History:   Diagnosis Date     Acid reflux      Allergies      HTN (hypertension) 1990     Squamous cell carcinoma      Varicose veins      Past Surgical History:   Procedure Laterality Date     COLONOSCOPY  2001, 2006, 2010    polyp     keratinous cyst  12/09     LIGATE VEIN  VARICOSE, PHLEBECTOMY MULTIPLE STAB, COMBINED  1995     MOHS MICROGRAPHIC PROCEDURE       NO HISTORY OF SURGERY  10/24/13    derm       Social History:  Social History     Social History     Marital status:      Spouse name: N/A     Number of children: N/A     Years of education: N/A     Occupational History     Not on file.     Social History Main Topics     Smoking status: Never Smoker     Smokeless tobacco: Never Used     Alcohol use Yes     Drug use: No     Sexual activity: Yes     Partners: Female     Other Topics Concern     Parent/Sibling W/ Cabg, Mi Or Angioplasty Before 65f 55m? No     Social History Narrative       Family History:  Family History   Problem Relation Age of Onset     CANCER Mother      thyroid     Breast Cancer Sister      Melanoma No family hx of      Skin Cancer No family hx of        Review of Systems:    CONSTITUTIONAL: NEGATIVE for weight gain/loss, fever, chills, sweats or night sweats, drug allergies.  EYES: NEGATIVE for changes in vision, blind spots, double vision.  ENT: nasal congestion NEGATIVE for ear pain, sore throat, sinus pain, post-nasal drip, runny nose, bloody nose  CARDIAC: HTN NEGATIVE for fast heartbeats or fluttering in chest, chest pain or pressure, breathlessness when lying flat, swollen legs or swollen feet.  NEUROLOGIC: NEGATIVE headaches, weakness or numbness in the arms or legs.  DERMATOLOGIC: NEGATIVE for rashes, new moles or change in mole(s)  PULMONARY: NEGATIVE SOB at rest, SOB with activity, dry cough, productive cough, coughing up blood, wheezing or whistling when breathing.    GASTROINTESTINAL: NEGATIVE for nausea or vomitting, loose or watery stools, fat or grease in stools, constipation, abdominal pain, bowel movements black in color or blood noted.  GENITOURINARY: NEGATIVE for pain during urination, blood in urine, urinating more frequently than usual  MUSCULOSKELETAL: NEGATIVE for muscle pain, bone or joint pain, swollen joints.  ENDOCRINE:  "NEGATIVE for increased thirst or urination, diabetes.  LYMPHATIC: NEGATIVE for swollen lymph nodes, lumps or bumps in the breasts or nipple discharge.  PSYCHE: NEGATIVE for depression, anxiety    Physical Examination:  Vitals: /86  Pulse 75  Resp 18  Ht 1.905 m (6' 3\")  Wt 127.5 kg (281 lb)  SpO2 96%  BMI 35.12 kg/m2  BMI= Body mass index is 35.12 kg/(m^2).    Neck Cir (cm): 46 cm    [place] Total Score 10/11/2017   Total score - [place] 12       General: No apparent distress, appropriately groomed  Head: Normocephalic, atraumatic  Eyes: no icterus, PERRL  Nose: Nares patent. No exudate/erythema. Slight septal deviation noted.  Mouth: teeth show signs of wear  Orophraynx:  uvula: is short/thick and inflammed, high arched palate   Mallampati Class: III   Tonsillar Stage: not visualized  Neck: Supple, Circumference: 18 inches, no thyromegaly  Cardiac: Regular rate and rhythm, no murmurs  Chest: Symmetric air movement, lungs clear to auscultation bilaterally  GI: +bowel sounds  Musculoskeletal: no edema noted but multiple varicosities in Lower extremities.  Skin: Warm, dry, intact  Psych: pleasant, mood/affect euthymic  Mental status: Awake, alert, attentive, oriented.      Impression/Plan:    Snoring  Observed sleep apnea  Possible Obstructive sleep apnea    Juan Jose Razo is a 66 year old male with PMH HTN, GERD, snoring who is being evaluated for BUCK.  STOP BANG score is 8. Patient likely has sleep apnea based on clinical history of snoring, snort arousals, witnessed apneas, dry mouth,  non-refreshing sleep, daytime sleepiness/fatigue.  Physical exam significant for enlarged neck.    Recommend HST as patient is high risk for BUCK without any comorbid conditions.    Diagnosis and treatment for BUCK have been discussed. Complications of untreated BUCK have also been discussed.    Patient is a life long non-smoker and has been encouraged to continue to not smoke.    Patient was strongly advised to avoid " "driving, operating any heavy machinery or other hazardous situations while drowsy or sleepy.  Patient was counseled on the importance of driving while alert, to pull over if drowsy, or nap before getting into the vehicle if sleepy.      He will follow up with me approximately one week after his sleep study has been competed to review the results and discuss plan of care.      CC: Dr. Alphonso Grover      Seen and examined with Dr. Pattie Blount MD  Clinical Sleep Medicine Fellow  Pager #[phone]      Attending: As the attending physician I was present with  Dr. Ziyad Blount,  during this clinic visit. I personally reviewed the key aspects of the history and physical exam and I agree with the above documentation.    \"I spent a total of 25 minutes face to face with Juan Jose Razo during today's office visit. Over 50% of this time was spent counseling the patient and  coordinating care regarding sleep apnea,HST.\"       Ernesto Blankenship MD   of Medicine,  Division of Pulmonary/Sleep Medicine  [facility].    "

## 2017-10-12 ENCOUNTER — OFFICE VISIT (OUTPATIENT)
Dept: SLEEP MEDICINE | Facility: CLINIC | Age: 67
End: 2017-10-12
Attending: INTERNAL MEDICINE
Payer: COMMERCIAL

## 2017-10-12 DIAGNOSIS — G47.30 OBSERVED SLEEP APNEA: ICD-10-CM

## 2017-10-12 DIAGNOSIS — R06.83 SNORING: ICD-10-CM

## 2017-10-12 PROCEDURE — 40000809 ZZH STATISTIC NO DOCUMENTATION TO SUPPORT CHARGE

## 2017-10-12 PROCEDURE — 99211 OFF/OP EST MAY X REQ PHY/QHP: CPT | Mod: ZF

## 2017-10-12 PROCEDURE — G0399 HOME SLEEP TEST/TYPE 3 PORTA: HCPCS | Mod: ZF

## 2017-10-12 NOTE — PROGRESS NOTES
Patient presented to clinic for  and demonstration of the HST Device. Patient was set up and instructed use. Patient verbalized understanding and will be returning device after 10 am.      Patient was given HST sleep logs and written instructions for use.        TIM Sanchez

## 2017-10-12 NOTE — PATIENT INSTRUCTIONS
My home sleep study:    ______I will activate the device as shown on the video    __X____My device is programmed to start automatically at     _______11:00 pm_______ Time   on _____10/12/2017_________  Day/Date    My contact number if I have problems is __________[phone]_________________________      I will watch the video before I hook it up at night: [email or web address]    In case of an emergency call [phone]

## 2017-10-12 NOTE — MR AVS SNAPSHOT
After Visit Summary   10/12/2017    Juan Jose Razo    MRN: [id number]           Patient Information     Date Of Birth          1950        Visit Information        Provider Department      10/12/2017 2:00 PM SLEEP STUDY RM [facility]        Today's Diagnoses     Observed sleep apnea        Snoring          Care Instructions    My home sleep study:    ______I will activate the device as shown on the video    __X____My device is programmed to start automatically at     _______11:00 pm_______ Time   on _____10/12/2017_________  Day/Date    My contact number if I have problems is __________[phone]_________________________      I will watch the video before I hook it up at night: [email or web address]    In case of an emergency call [phone]                    Follow-ups after your visit        Your next 10 appointments already scheduled     Oct 13, 2017 10:30 AM CDT   HST Drop Off with DME SCHEDULE   [facility] Sleep [facility] ([facility])    [address] [place] [id number] [phone]            Oct 24, 2017  9:20 AM CDT   Return Sleep Patient with Ziyad lBount MD   [facility] ([facility])    [address] [place] [id number] [phone]              Who to contact     If you have questions or need follow up information about today's clinic visit or your schedule please contact [facility] SLEEP [facility] directly at [phone].  Normal or non-critical lab and imaging results will be communicated to you by MyChart, letter or phone within 4 business days after the clinic has received the results. If you do not hear from us within 7 days, please contact the clinic through MyChart or phone. If you have a critical or abnormal lab result, we will notify you by phone as soon as possible.  Submit refill requests through [employer] or call your  pharmacy and they will forward the refill request to us. Please allow 3 business days for your refill to be completed.          Additional Information About Your Visit        [employer] Information     [employer] gives you secure access to your electronic health record. If you see a primary care provider, you can also send messages to your care team and make appointments. If you have questions, please call your primary care clinic.  If you do not have a primary care provider, please call [phone] and they will assist you.        Care EveryWhere ID     This is your Care EveryWhere ID. This could be used by other organizations to access your [place] medical records  [id number]         Blood Pressure from Last 3 Encounters:   10/11/17 126/86   06/26/17 120/88   02/09/17 121/83    Weight from Last 3 Encounters:   10/11/17 127.5 kg (281 lb)   06/26/17 124.2 kg (273 lb 12.8 oz)   09/02/16 127.5 kg (281 lb)              We Performed the Following     HST-Home Sleep Apnea Test        Primary Care Provider Office Phone # Fax #    Alphonso Ceasar Grover -[phone]       [address]  [facility] [address]        Equal Access to Services     RENEA FRASER : Hadii dawn sánchez hadasho Sowindyali, waaxda luqadaha, qaybta kaalmada adeegyada, jumana ruiz. So [facility] [phone].    ATENCIÓN: Si habla español, tiene a lo disposición servicios gratuitos de asistencia lingüística. Fabienne al [phone].    We comply with applicable federal civil rights laws and [place] laws. We do not discriminate on the basis of race, color, national origin, age, disability, sex, sexual orientation, or gender identity.            Thank you!     Thank you for choosing [facility] SLEEP STUDY  for your care. Our goal is always to provide you with excellent care. Hearing back from our patients is one way we can continue to improve our services. Please take a few minutes to complete the written  survey that you may receive in the mail after your visit with us. Thank you!             Your Updated Medication List - Protect others around you: Learn how to safely use, store and throw away your medicines at www.disposemymeds.org.          This list is accurate as of: 10/12/17  2:03 PM.  Always use your most recent med list.                   Brand Name Dispense Instructions for use Diagnosis    amLODIPine 5 MG tablet    NORVASC    180 tablet    Take 2 tablets (10 mg) by mouth daily    Hypertension goal BP (blood pressure) < 140/90       CENTRUM SILVER ULTRA MENS PO           cholecalciferol 1000 UNIT tablet    vitamin D     Take by mouth daily        clotrimazole 1 % cream    LOTRIMIN    30 g    Apply topically 2 times daily    Tinea corporis       Desloratadine-Pseudoephedrine 5-240 MG Tb24    CLARINEX-D 24 HOUR    30 tablet    Take 1 tablet by mouth daily as needed    Acute seasonal allergic rhinitis due to pollen       hydrocortisone 0.2 % cream    WESTCORT    60 g    Apply topically 2 times daily    Intertrigo       hydrocortisone 2.5 % ointment     30 g    Apply topically 2 times daily    Tinea corporis       LANsoprazole 30 MG CR capsule    PREVACID    90 capsule    Take 1 capsule (30 mg) by mouth daily    Gastroesophageal reflux disease without esophagitis       metoprolol 100 MG 24 hr tablet    TOPROL-XL    90 tablet    Take 1 tablet (100 mg) by mouth daily    Hypertension goal BP (blood pressure) < 140/90       ranitidine 300 MG tablet    ZANTAC    90 tablet    Take 1 tablet (300 mg) by mouth At Bedtime    Gastroesophageal reflux disease without esophagitis       * sildenafil 100 MG tablet    VIAGRA    6 tablet    Take 0.5-1 tablets ( mg) by mouth daily as needed for erectile dysfunction Take 30 min to 4 hours before intercourse.  Never use with nitroglycerin, terazosin or doxazosin.    ED (erectile dysfunction)       * sildenafil 100 MG tablet    VIAGRA    6 tablet    Take 0.5-1 tablets (  mg) by mouth daily as needed for erectile dysfunction Take 30 min to 4 hours before intercourse.  Never use with nitroglycerin, terazosin or doxazosin.    Erectile dysfunction due to arterial insufficiency       * Notice:  This list has 2 medication(s) that are the same as other medications prescribed for you. Read the directions carefully, and ask your doctor or other care provider to review them with you.

## 2017-10-13 ENCOUNTER — APPOINTMENT (OUTPATIENT)
Dept: SLEEP MEDICINE | Facility: CLINIC | Age: 67
End: 2017-10-13
Attending: INTERNAL MEDICINE
Payer: COMMERCIAL

## 2017-10-13 NOTE — PROCEDURES
"HOME SLEEP STUDY INTERPRETATION    Patient: Juan Jose Razo  MRN: [id number]  YOB: 1950  Study Date: 10/12/2017  Referring Provider: Alphonso Grover  Ordering Provider: Phylicia Blankenship MD     Indications for Home Study: Juan Jose Razo is a 66 year old male with a history of hypertension, GERD who presents with symptoms suggestive of obstructive sleep apnea.    Estimated body mass index is 35.12 kg/(m^2) as calculated from the following:    Height as of 10/11/17: 1.905 m (6' 3\").    Weight as of 10/11/17: 127.5 kg (281 lb).  Total score - [place]: 12 (10/11/2017  8:00 AM)  StopBang Total Score: 8 (10/13/2017  2:00 PM)    Data: A full night home sleep study was performed recording the standard physiologic parameters including body position, movement, sound, nasal pressure, thermal oral airflow, chest and abdominal movements with respiratory inductance plethysmography, and oxygen saturation by pulse oximetry. Pulse rate was estimated by oximetry recording. This study was considered adequate based on > 4 hours of quality oximetry and respiratory recording. As specified by the AASM Manual for the Scoring of Sleep and Associated events, version 2.3, Rule VIII.D 1B, 4% oxygen desaturation scoring for hypopneas is used as a standard of care on all home sleep apnea testing.    Analysis Time:  486 minutes    Respiration:   Sleep Associated Hypoxemia: sustained hypoxemia was present. Baseline oxygen saturation was 90%.  Time with saturation less than or equal to 88% was 67 minutes. The lowest oxygen saturation was 76%.   Snoring: Snoring was present - very loud and continuous.  Respiratory events: The home study revealed a presence of 62 obstructive apneas and 10 mixed and central apneas. There were 193 hypopneas resulting in a combined apnea/hypopnea index [AHI] of 32.7 events per hour.  AHI was 60.5 per hour supine, 0 per hour prone, 35.8 per hour on left side, and 16.9 per hour on right " side.   Pattern: Excluding events noted above, respiratory rate and pattern was Normal.    Position: Percent of time spent: supine - 16%, prone - 0%, on left - 45%, on right - 38%.    Heart Rate: By pulse oximetry tachycardia was noted.     Assessment:   Severe obstructive sleep apnea.  Loud continuous snoring.   Sleep associated hypoxemia was present.    Recommendations:  Consider auto-CPAP at 5-15 cmH2O.  Suggest optimizing sleep hygiene and avoiding sleep deprivation.  Weight management.    Diagnosis Code(s): Obstructive Sleep Apnea G47.33, Hypoxemia G47.36, Snoring R06.83    Fausto Kendall MD, October 13, 2017   Diplomate, American Board of Internal Medicine, Sleep Medicine

## 2017-10-13 NOTE — PROGRESS NOTES
This HST performed using a Noxturnal T3 device which recorded snore, sound, movement activity, body position, nasal pressure, oronasal thermal airflow, pulse, oximetry and both chest and abdominal respiratory effort. HST data was confined to the time patients states they were in bed.   Patient was score using 1B rules. Patient respiratory events showed an AHI of 32.7 with frequent loud snoring. Overall signal quality was good.    Pt will follow up with sleep provider to determine appropriate therapy.

## 2017-10-24 ENCOUNTER — OFFICE VISIT (OUTPATIENT)
Dept: SLEEP MEDICINE | Facility: CLINIC | Age: 67
End: 2017-10-24
Attending: INTERNAL MEDICINE
Payer: COMMERCIAL

## 2017-10-24 VITALS
SYSTOLIC BLOOD PRESSURE: 133 MMHG | HEART RATE: 88 BPM | DIASTOLIC BLOOD PRESSURE: 80 MMHG | OXYGEN SATURATION: 95 % | RESPIRATION RATE: 16 BRPM | BODY MASS INDEX: 34.94 KG/M2 | HEIGHT: 75 IN | WEIGHT: 281 LBS

## 2017-10-24 DIAGNOSIS — G47.33 OSA (OBSTRUCTIVE SLEEP APNEA): Primary | ICD-10-CM

## 2017-10-24 PROCEDURE — 99211 OFF/OP EST MAY X REQ PHY/QHP: CPT | Mod: ZF

## 2017-10-24 NOTE — MR AVS SNAPSHOT
After Visit Summary   10/24/2017    Juan Jose Razo    MRN: [id number]           Patient Information     Date Of Birth          1950        Visit Information        Provider Department      10/24/2017 9:20 AM Ziyad Blount MD [facility], [place], Sleep Study        Today's Diagnoses     BUCK (obstructive sleep apnea)    -  1      Care Instructions      Your BMI is Body mass index is 35.12 kg/(m^2).  Weight management is a personal decision.  If you are interested in exploring weight loss strategies, the following discussion covers the approaches that may be successful. Body mass index (BMI) is one way to tell whether you are at a healthy weight, overweight, or obese. It measures your weight in relation to your height.  A BMI of 18.5 to 24.9 is in the healthy range. A person with a BMI of 25 to 29.9 is considered overweight, and someone with a BMI of 30 or greater is considered obese. More than two-thirds of American adults are considered overweight or obese.  Being overweight or obese increases the risk for further weight gain. Excess weight may lead to heart disease and diabetes.  Creating and following plans for healthy eating and physical activity may help you improve your health.  Weight control is part of healthy lifestyle and includes exercise, emotional health, and healthy eating habits. Careful eating habits lifelong are the mainstay of weight control. Though there are significant health benefits from weight loss, long-term weight loss with diet alone may be very difficult to achieve- studies show long-term success with dietary management in less than 10% of people. Attaining a healthy weight may be especially difficult to achieve in those with severe obesity. In some cases, medications, devices and surgical management might be considered.  What can you do?  If you are overweight or obese and are interested in methods for weight loss, you should discuss this with your provider.      Consider reducing daily calorie intake by 500 calories.     Keep a food journal.     Avoiding skipping meals, consider cutting portions instead.    Diet combined with exercise helps maintain muscle while optimizing fat loss. Strength training is particularly important for building and maintaining muscle mass. Exercise helps reduce stress, increase energy, and improves fitness. Increasing exercise without diet control, however, may not burn enough calories to loose weight.       Start walking three days a week 10-20 minutes at a time    Work towards walking thirty minutes five days a week     Eventually, increase the speed of your walking for 1-2 minutes at time    In addition, we recommend that you review healthy lifestyles and methods for weight loss available through the National Institutes of Health patient information sites:  http://win.niddk.nih.gov/publications/index.htm    And look into health and wellness programs that may be available through your health insurance provider, employer, local community center, or [employer] club.    Weight management plan: Patient was referred to their PCP to discuss a diet and exercise plan.              Follow-ups after your visit        Follow-up notes from your care team     Return in about 7 weeks (around 12/12/2017).      Future tests that were ordered for you today     Open Future Orders        Priority Expected Expires Ordered    Overnight oximetry study Routine  4/22/2018 10/24/2017            Who to contact     If you have questions or need follow up information about today's clinic visit or your schedule [facility] directly at [phone].  Normal or non-critical lab and imaging results will be communicated to you by MyChart, letter or phone within 4 business days after the clinic has received the results. If you do not hear from us within 7 days, please contact the clinic through MyChart or phone. If you have a critical or abnormal  "lab result, we will notify you by phone as soon as possible.  Submit refill requests through [employer] or call your pharmacy and they will forward the refill request to us. Please allow 3 business days for your refill to be completed.          Additional Information About Your Visit        [employer] Information     [employer] gives you secure access to your electronic health record. If you see a primary care provider, you can also send messages to your care team and make appointments. If you have questions, please call your primary care clinic.  If you do not have a primary care provider, please call [phone] and they will assist you.        Care EveryWhere ID     This is your Care EveryWhere ID. This could be used by other organizations to access your [facility] medical records  [id number]        Your Vitals Were     Pulse Respirations Height Pulse Oximetry BMI (Body Mass Index)       88 16 1.905 m (6' 3\") 95% 35.12 kg/m2        Blood Pressure from Last 3 Encounters:   10/24/17 (!) 133/8   10/11/17 126/86   06/26/17 120/88    Weight from Last 3 Encounters:   10/24/17 127.5 kg (281 lb)   10/11/17 127.5 kg (281 lb)   06/26/17 124.2 kg (273 lb 12.8 oz)              We Performed the Following     Comprehensive DME        Primary Care Provider Office Phone # Fax #    Alphonso Ceasar Grover [phone]       [address]  [facility] [address]        Equal Access to Services     YUNIOR FRASER AH: Hadii dawn ku hadasho Soomaali, waaxda luqadaha, qaybta kaalmada adeegyada, waxay lito kelly . So [facility] [phone].    ATENCIÓN: Si habla español, tiene a lo disposición servicios gratuitos de asistencia lingüística. Fabienne al [phone].    We comply with applicable federal civil rights laws and [place] laws. We do not discriminate on the basis of race, color, national origin, age, disability, sex, sexual orientation, or gender identity.            Thank you!     Thank you for " choosing [facility] [place], SLEEP STUDY  for your care. Our goal is always to provide you with excellent care. Hearing back from our patients is one way we can continue to improve our services. Please take a few minutes to complete the written survey that you may receive in the mail after your visit with us. Thank you!             Your Updated Medication List - Protect others around you: Learn how to safely use, store and throw away your medicines at www.disposemymeds.org.          This list is accurate as of: 10/24/17 10:20 AM.  Always use your most recent med list.                   Brand Name Dispense Instructions for use Diagnosis    amLODIPine 5 MG tablet    NORVASC    180 tablet    Take 2 tablets (10 mg) by mouth daily    Hypertension goal BP (blood pressure) < 140/90       CENTRUM SILVER ULTRA MENS PO           cholecalciferol 1000 UNIT tablet    vitamin D3     Take by mouth daily        clotrimazole 1 % cream    LOTRIMIN    30 g    Apply topically 2 times daily    Tinea corporis       Desloratadine-Pseudoephedrine 5-240 MG Tb24    CLARINEX-D 24 HOUR    30 tablet    Take 1 tablet by mouth daily as needed    Acute seasonal allergic rhinitis due to pollen       hydrocortisone 0.2 % cream    WESTCORT    60 g    Apply topically 2 times daily    Intertrigo       hydrocortisone 2.5 % ointment     30 g    Apply topically 2 times daily    Tinea corporis       LANsoprazole 30 MG CR capsule    PREVACID    90 capsule    Take 1 capsule (30 mg) by mouth daily    Gastroesophageal reflux disease without esophagitis       metoprolol 100 MG 24 hr tablet    TOPROL-XL    90 tablet    Take 1 tablet (100 mg) by mouth daily    Hypertension goal BP (blood pressure) < 140/90       ranitidine 300 MG tablet    ZANTAC    90 tablet    Take 1 tablet (300 mg) by mouth At Bedtime    Gastroesophageal reflux disease without esophagitis       * sildenafil 100 MG tablet    VIAGRA    6 tablet    Take 0.5-1 tablets ( mg) by mouth daily as  needed for erectile dysfunction Take 30 min to 4 hours before intercourse.  Never use with nitroglycerin, terazosin or doxazosin.    ED (erectile dysfunction)       * sildenafil 100 MG tablet    VIAGRA    6 tablet    Take 0.5-1 tablets ( mg) by mouth daily as needed for erectile dysfunction Take 30 min to 4 hours before intercourse.  Never use with nitroglycerin, terazosin or doxazosin.    Erectile dysfunction due to arterial insufficiency       * Notice:  This list has 2 medication(s) that are the same as other medications prescribed for you. Read the directions carefully, and ask your doctor or other care provider to review them with you.

## 2017-10-24 NOTE — PROGRESS NOTES
Sleep Follow-Up Visit:    Date on this visit: 10/24/2017    Juan Jose Razo is a 66 year old male with PMH HTN, GERD, and snoring comes in today for follow-up. HST was performed on 10/12/17 with an AHI of 32.7/hr.    Mr. Razo and I discussed his HST results and the diagnosis of severe obstructive sleep apnea.  We also discussed effects of untreated sleep apnea, and treatment with CPAP.  Mr. Razo agreed to trial of CPAP.    Past medical/surgical history, family history, social history, medications and allergies were reviewed.    HST 10/12/17:  Respiration:   Sleep Associated Hypoxemia: sustained hypoxemia was present. Baseline oxygen saturation was 90%.  Time with saturation less than or equal to 88% was 67 minutes. The lowest oxygen saturation was 76%.   Snoring: Snoring was present - very loud and continuous.  Respiratory events: The home study revealed a presence of 62 obstructive apneas and 10 mixed and central apneas. There were 193 hypopneas resulting in a combined apnea/hypopnea index [AHI] of 32.7 events per hour.  AHI was 60.5 per hour supine, 0 per hour prone, 35.8 per hour on left side, and 16.9 per hour on right side.   Pattern: Excluding events noted above, respiratory rate and pattern was Normal.    ESS: 7    Problem List:  Patient Active Problem List    Diagnosis Date Noted     Multiple benign nevi 09/06/2015     Priority: Medium     Seborrheic keratoses 09/06/2015     Priority: Medium     Skin cancer screening 09/06/2015     Priority: Medium     Skin exam, screening for cancer 10/24/2013     Priority: Medium     Phlebitis and thrombophlebitis of femoral vein (deep) (superficial), left 02/01/2013     Priority: Medium     IMO update changed this record. Please review for accuracy       Advanced directives, counseling/discussion 01/23/2013     Priority: Medium     Patient states has Advance Directive and will bring in a copy to clinic. 1/23/2013          CARDIOVASCULAR SCREENING; LDL GOAL LESS  "THAN 130 03/06/2012     Priority: Medium     Hypertension goal BP (blood pressure) < 140/90 03/06/2012     Priority: Medium     GERD (gastroesophageal reflux disease) 03/06/2012     Priority: Medium     Seasonal allergic rhinitis 03/06/2012     Priority: Medium        Physical Exam:  BP (!) 133/8  Pulse 88  Resp 16  Ht 1.905 m (6' 3\")  Wt 127.5 kg (281 lb)  SpO2 95%  BMI 35.12 kg/m2    General: No apparent distress, appropriately groomed  Chest: no cough  Psych: pleasant, mood/affect euthymic    Impression/Plan:    Severe Obstructive Sleep Apnea  Obesity    Mr. Juan Jose Razo is a 66 year old male with PMH HTN, GERD, and now severe BUCK with an AHI of 32.7.  He agreed to a trial of CPAP, which will be set as auto CPAP 5-15 cm H20.  We recommended weight loss and healthy diet.  Overnight oximetry ordered to be done in next two weeks.    Patient was strongly advised to avoid driving, operating any heavy machinery or other hazardous situations while drowsy or sleepy.  Patient was counseled on the importance of driving while alert, to pull over if drowsy, or nap before getting into the vehicle if sleepy.  We recommend he discuss his sudafed use for nasal congestion with his PCP.    Follow up 7 weeks.  ?  Seen and examined with Dr. Briseno  ?  Ziyad Blount MD  Clinical Sleep Medicine Fellow  Pager #[phone]      Attending: As the attending physician I was present with  Dr. Ziyad Blount,  during this clinic visit. I personally reviewed the key aspects of the history and physical exam and I agree with the above documentation.      \"I spent a total of 15 minutes  face to face with Juan Jose Razo during today's office visit. Over 50% of this time was spent counseling the patient and  coordinating care regarding sleep apnea/CPAP treatment.\"       Ernesto Blankenship MD   of Medicine,  Division of Pulmonary/Sleep Medicine  [facility].    "

## 2017-10-24 NOTE — PATIENT INSTRUCTIONS

## 2017-10-24 NOTE — NURSING NOTE
"Chief Complaint   Patient presents with     Study Results     HST       Initial /80  Pulse 88  Resp 16  Ht 1.905 m (6' 3\")  Wt 127.5 kg (281 lb)  SpO2 95%  BMI 35.12 kg/m2 Estimated body mass index is 35.12 kg/(m^2) as calculated from the following:    Height as of this encounter: 1.905 m (6' 3\").    Weight as of this encounter: 127.5 kg (281 lb).  Medication Reconciliation: complete   Quita Looney CMA       "

## 2017-10-27 ENCOUNTER — DOCUMENTATION ONLY (OUTPATIENT)
Dept: SLEEP MEDICINE | Facility: CLINIC | Age: 67
End: 2017-10-27

## 2017-10-27 DIAGNOSIS — G47.33 OSA ON CPAP: Primary | ICD-10-CM

## 2017-10-27 NOTE — PROGRESS NOTES
Patient was offered choice of vendor and chose [facility].  Patient Juan Jose Razo was set up at [place] on October 27, 2017. Patient received a Resmed AirSense 10 Auto. Pressures were set at 5-15 cm H2O.   Patient s ramp is off cm H2O and FLEX/EPR is at 2.  Patient received a Resmed Airfit F20 Mask name:  Full Face mask Size Large, heated tubing and heated humidifier.  Patient is enrolled in the STM Program and does not need to meet compliance. Patient has a follow up on 12/12/17 with Dr. Blount.  Xiang Harp

## 2017-10-30 ENCOUNTER — DOCUMENTATION ONLY (OUTPATIENT)
Dept: SLEEP MEDICINE | Facility: CLINIC | Age: 67
End: 2017-10-30

## 2017-10-30 NOTE — PROGRESS NOTES
3 DAY STM VISIT    Patient contacted for 3 day STM visit  Message left for patient to return call     Device type: Auto-CPAP  PAP settings: CPAP min 5 cm  H20     CPAP max 15 cm  H20  Assessment: Nightly usage over four hours.  Action plan: Pt to have f/u 14 day STM visit.  Diagnostic AHI:  32.7

## 2017-11-13 ENCOUNTER — DOCUMENTATION ONLY (OUTPATIENT)
Dept: SLEEP MEDICINE | Facility: CLINIC | Age: 67
End: 2017-11-13

## 2017-11-13 NOTE — PROGRESS NOTES
14 DAY STM VISIT    Message left for patient to return call     Assessment: Pt not meeting objective benchmarks for AHI (only slightly elevated)      Action plan: waiting for patient to return call.  and pt to have 30 day STM visit.    Device type: Auto-CPAP  PAP settings: CPAP min 5 cm  H20     CPAP max 15 cm  H20    95th% pressure 14.4 cm  H20   Objective measures: 14 day rolling measures      Compliance  100 %      Leak  11.6 lpm  last  upload      AHI 5.38   last  upload      Average number of minutes 501    Diagnostic AHI:   32.7    Objective measure goal  Compliance   Goal >70%  Leak   Goal < 24 lpm  AHI  Goal < 5  Usage  Goal >240

## 2017-11-16 ENCOUNTER — OFFICE VISIT (OUTPATIENT)
Dept: SLEEP MEDICINE | Facility: CLINIC | Age: 67
End: 2017-11-16
Attending: INTERNAL MEDICINE
Payer: COMMERCIAL

## 2017-11-16 DIAGNOSIS — G47.33 OSA (OBSTRUCTIVE SLEEP APNEA): ICD-10-CM

## 2017-11-16 NOTE — PROGRESS NOTES
Patient presented to clinic for  and demonstration of the overnight oximetry. Patient was set up and instructed use. Patient verbalized understanding and will be returning device by 10 am tomorrow.    Patient was given sleep logs and written instructions for use    [facility]  Sleep Clinic-Specialist,    Registered Medical Assistant   [facility]

## 2017-11-16 NOTE — MR AVS SNAPSHOT
After Visit Summary   11/16/2017    Juan Jose Razo    MRN: [id number]           Patient Information     Date Of Birth          1950        Visit Information        Provider Department      11/16/2017 10:30 AM [facility]UDY [facility]        Today's Diagnoses     BUCK (obstructive sleep apnea)           Follow-ups after your visit        Your next 10 appointments already scheduled     Nov 17, 2017  9:00 AM CST   Oximetry Drop Off with DME SCHEDULE   [facility] ([facility])    [address] [place] [address] [id number] [phone]            Dec 12, 2017  9:20 AM CST   Return Sleep Patient with Ziyad Blount MD   [facility] ([facility])    [address] [place] [address] [id number] [phone]            Jan 29, 2018  2:15 PM CST   (Arrive by 2:00 PM)   Return Visit with Imelda Rincon PA-C   [facility] ([facility])    [address] [place] [id number] [phone]              Who to contact     If you have questions or need follow up information about today's clinic visit or your schedule please contact [facility] directly at [phone].  Normal or non-critical lab and imaging results will be communicated to you by MyChart, letter or phone within 4 business days after the clinic has received the results. If you do not hear from us within 7 days, please contact the clinic through MyChart or phone. If you have a critical or abnormal lab result, we will notify you by phone as soon as possible.  Submit refill requests through [employer] or call your pharmacy and they will forward the refill request to us. Please allow 3 business days for your refill to be completed.          Additional Information About Your Visit        [employer] Information     [employer] gives you  secure access to your electronic health record. If you see a primary care provider, you can also send messages to your care team and make appointments. If you have questions, please call your primary care clinic.  If you do not have a primary care provider, please call [phone] and they will assist you.        Care EveryWhere ID     This is your Care EveryWhere ID. This could be used by other organizations to access your [facility] medical records  [id number]         Blood Pressure from Last 3 Encounters:   10/24/17 133/80   10/11/17 126/86   06/26/17 120/88    Weight from Last 3 Encounters:   10/24/17 127.5 kg (281 lb)   10/11/17 127.5 kg (281 lb)   06/26/17 124.2 kg (273 lb 12.8 oz)              We Performed the Following     Overnight oximetry study        Primary Care Provider Office Phone # Fax #    Alphonso Ceasar Grover [phone]       [address] [facility] [address]  [facility] [address]        Equal Access to Services     RENEA FRASER : Hadii aad ku hadasho Soomaali, waaxda luqadaha, qaybta kaalmada adeegyada, waxay idiin hayjodie kelly . So [facility] [phone].    ATENCIÓN: Si habla español, tiene a lo disposición servicios gratuitos de asistencia lingüística. Llame al [phone].    We comply with applicable federal civil rights laws and [place] laws. We do not discriminate on the basis of race, color, national origin, age, disability, sex, sexual orientation, or gender identity.            Thank you!     Thank you for choosing [facility], SLEEP STUDY  for your care. Our goal is always to provide you with excellent care. Hearing back from our patients is one way we can continue to improve our services. Please take a few minutes to complete the written survey that you may receive in the mail after your visit with us. Thank you!             Your Updated Medication List - Protect others around you: Learn how to safely use, store and throw away your medicines at  www.disposemymeds.org.          This list is accurate as of: 11/16/17 10:52 AM.  Always use your most recent med list.                   Brand Name Dispense Instructions for use Diagnosis    amLODIPine 5 MG tablet    NORVASC    180 tablet    Take 2 tablets (10 mg) by mouth daily    Hypertension goal BP (blood pressure) < 140/90       CENTRUM SILVER ULTRA MENS PO           cholecalciferol 1000 UNIT tablet    vitamin D3     Take by mouth daily        clotrimazole 1 % cream    LOTRIMIN    30 g    Apply topically 2 times daily    Tinea corporis       Desloratadine-Pseudoephedrine 5-240 MG Tb24    CLARINEX-D 24 HOUR    30 tablet    Take 1 tablet by mouth daily as needed    Acute seasonal allergic rhinitis due to pollen       hydrocortisone 0.2 % cream    WESTCORT    60 g    Apply topically 2 times daily    Intertrigo       hydrocortisone 2.5 % ointment     30 g    Apply topically 2 times daily    Tinea corporis       LANsoprazole 30 MG CR capsule    PREVACID    90 capsule    Take 1 capsule (30 mg) by mouth daily    Gastroesophageal reflux disease without esophagitis       metoprolol 100 MG 24 hr tablet    TOPROL-XL    90 tablet    Take 1 tablet (100 mg) by mouth daily    Hypertension goal BP (blood pressure) < 140/90       ranitidine 300 MG tablet    ZANTAC    90 tablet    Take 1 tablet (300 mg) by mouth At Bedtime    Gastroesophageal reflux disease without esophagitis       * sildenafil 100 MG tablet    VIAGRA    6 tablet    Take 0.5-1 tablets ( mg) by mouth daily as needed for erectile dysfunction Take 30 min to 4 hours before intercourse.  Never use with nitroglycerin, terazosin or doxazosin.    ED (erectile dysfunction)       * sildenafil 100 MG tablet    VIAGRA    6 tablet    Take 0.5-1 tablets ( mg) by mouth daily as needed for erectile dysfunction Take 30 min to 4 hours before intercourse.  Never use with nitroglycerin, terazosin or doxazosin.    Erectile dysfunction due to arterial insufficiency        * Notice:  This list has 2 medication(s) that are the same as other medications prescribed for you. Read the directions carefully, and ask your doctor or other care provider to review them with you.

## 2017-11-17 ENCOUNTER — DOCUMENTATION ONLY (OUTPATIENT)
Dept: SLEEP MEDICINE | Facility: CLINIC | Age: 67
End: 2017-11-17
Attending: INTERNAL MEDICINE
Payer: COMMERCIAL

## 2017-11-17 NOTE — PROGRESS NOTES
Results received from [facility] for overnight oximetry that was picked up on 11/16/2017.     Results labeled and scanned into chart.     Copy given to provider for review. Encounter also forwarded to provider.     Recording Date: 11/16/2017    Duration: 8:37:52    Note: Completed on Current PAP Settings.   PAP download was also collected and scanned into chart.

## 2017-11-27 ENCOUNTER — DOCUMENTATION ONLY (OUTPATIENT)
Dept: SLEEP MEDICINE | Facility: CLINIC | Age: 67
End: 2017-11-27

## 2017-11-27 NOTE — PROGRESS NOTES
30 DAY [facility] VISIT    Message left for patient to return call     Assessment: Pt meeting objective benchmarks.    Action plan: waiting for patient to return call.   Patient has a follow up visit with Dr. Blount on 12/12/2017.   Device type: Auto-CPAP  PAP settings: CPAP min 5 cm  H20     CPAP max 15 cm  H20    95th% pressure 14.6 cm  H20   Objective measures: 14 day rolling measures      Compliance  100 %      Leak  14.32 lpm  last  upload      AHI 4.29   last  upload      Average number of minutes 468    Diagnostic AHI:   32.7        Objective measure goal  Compliance   Goal >70%  Leak   Goal < 24 lpm  AHI  Goal < 5  Usage  Goal >240

## 2017-12-12 ENCOUNTER — OFFICE VISIT (OUTPATIENT)
Dept: SLEEP MEDICINE | Facility: CLINIC | Age: 67
End: 2017-12-12
Attending: INTERNAL MEDICINE
Payer: COMMERCIAL

## 2017-12-12 VITALS
HEART RATE: 77 BPM | SYSTOLIC BLOOD PRESSURE: 126 MMHG | OXYGEN SATURATION: 96 % | HEIGHT: 75 IN | DIASTOLIC BLOOD PRESSURE: 80 MMHG | BODY MASS INDEX: 35.81 KG/M2 | WEIGHT: 288 LBS | RESPIRATION RATE: 16 BRPM

## 2017-12-12 DIAGNOSIS — G47.33 OSA ON CPAP: Primary | ICD-10-CM

## 2017-12-12 PROCEDURE — 99211 OFF/OP EST MAY X REQ PHY/QHP: CPT | Mod: ZF

## 2017-12-12 NOTE — MR AVS SNAPSHOT
After Visit Summary   12/12/2017    Juan Jose Razo    MRN: [id number]           Patient Information     Date Of Birth          1950        Visit Information        Provider Department      12/12/2017 9:20 AM Ziyad Blount MD [facility], [place], Sleep Study        Today's Diagnoses     BUCK on CPAP    -  1      Care Instructions      Your BMI is Body mass index is 36 kg/(m^2).  Weight management is a personal decision.  If you are interested in exploring weight loss strategies, the following discussion covers the approaches that may be successful. Body mass index (BMI) is one way to tell whether you are at a healthy weight, overweight, or obese. It measures your weight in relation to your height.  A BMI of 18.5 to 24.9 is in the healthy range. A person with a BMI of 25 to 29.9 is considered overweight, and someone with a BMI of 30 or greater is considered obese. More than two-thirds of American adults are considered overweight or obese.  Being overweight or obese increases the risk for further weight gain. Excess weight may lead to heart disease and diabetes.  Creating and following plans for healthy eating and physical activity may help you improve your health.  Weight control is part of healthy lifestyle and includes exercise, emotional health, and healthy eating habits. Careful eating habits lifelong are the mainstay of weight control. Though there are significant health benefits from weight loss, long-term weight loss with diet alone may be very difficult to achieve- studies show long-term success with dietary management in less than 10% of people. Attaining a healthy weight may be especially difficult to achieve in those with severe obesity. In some cases, medications, devices and surgical management might be considered.  What can you do?  If you are overweight or obese and are interested in methods for weight loss, you should discuss this with your provider.     Consider reducing daily  calorie intake by 500 calories.     Keep a food journal.     Avoiding skipping meals, consider cutting portions instead.    Diet combined with exercise helps maintain muscle while optimizing fat loss. Strength training is particularly important for building and maintaining muscle mass. Exercise helps reduce stress, increase energy, and improves fitness. Increasing exercise without diet control, however, may not burn enough calories to loose weight.       Start walking three days a week 10-20 minutes at a time    Work towards walking thirty minutes five days a week     Eventually, increase the speed of your walking for 1-2 minutes at time    In addition, we recommend that you review healthy lifestyles and methods for weight loss available through the National Institutes of Health patient information sites:  http://win.niddk.nih.gov/publications/index.htm    And look into health and wellness programs that may be available through your health insurance provider, employer, local community center, or [employer] club.    Weight management plan: Patient was referred to their PCP to discuss a diet and exercise plan.              Follow-ups after your visit        Follow-up notes from your care team     Return in about 1 year (around 12/12/2018).      Your next 10 appointments already scheduled     Jan 29, 2018  2:15 PM CST   (Arrive by 2:00 PM)   Return Visit with Imelda Rincon PA-C   [facility] ([facility])    [address] [place] [id number] [phone]              Who to contact     If you have questions or need follow up information about today's clinic visit or your schedule please [facility] directly at [phone].  Normal or non-critical lab and imaging results will be communicated to you by MyChart, letter or phone within 4 business days after the clinic has received the results. If you do not hear from us within 7 days,  "please contact the clinic through [employer] or phone. If you have a critical or abnormal lab result, we will notify you by phone as soon as possible.  Submit refill requests through [employer] or call your pharmacy and they will forward the refill request to us. Please allow 3 business days for your refill to be completed.          Additional Information About Your Visit        [facility] Information     [employer] gives you secure access to your electronic health record. If you see a primary care provider, you can also send messages to your care team and make appointments. If you have questions, please call your primary care clinic.  If you do not have a primary care provider, please call [phone] and they will assist you.        Care EveryWhere ID     This is your Care EveryWhere ID. This could be used by other organizations to access your [facility] medical records  [id number]        Your Vitals Were     Pulse Respirations Height Pulse Oximetry BMI (Body Mass Index)       77 16 1.905 m (6' 3\") 96% 36 kg/m2        Blood Pressure from Last 3 Encounters:   12/12/17 126/80   10/24/17 133/80   10/11/17 126/86    Weight from Last 3 Encounters:   12/12/17 130.6 kg (288 lb)   10/24/17 127.5 kg (281 lb)   10/11/17 127.5 kg (281 lb)              We Performed the Following     Comprehensive DME        Primary Care Provider Office Phone # Fax #    Alphonso Ceasar Grover [phone]       [address] [facility] [address]  [facility] [address]        Equal Access to Services     YUNIOR FRASER AH: Hadii aad ku hadasho Soomaali, waaxda luqadaha, qaybta kaalmada adeegyada, waxay lito kelly . So [facility] [phone].    ATENCIÓN: Si habla español, tiene a lo disposición servicios gratuitos de asistencia lingüística. Llame al [phone].    We comply with applicable federal civil rights laws and [place] laws. We do not discriminate on the basis of race, color, national origin, age, disability, sex, sexual " orientation, or gender identity.            Thank you!     Thank you for choosing [facility] [place]  for your care. Our goal is always to provide you with excellent care. Hearing back from our patients is one way we can continue to improve our services. Please take a few minutes to complete the written survey that you may receive in the mail after your visit with us. Thank you!             Your Updated Medication List - Protect others around you: Learn how to safely use, store and throw away your medicines at www.disposemymeds.org.          This list is accurate as of: 12/12/17 10:10 AM.  Always use your most recent med list.                   Brand Name Dispense Instructions for use Diagnosis    amLODIPine 5 MG tablet    NORVASC    180 tablet    Take 2 tablets (10 mg) by mouth daily    Hypertension goal BP (blood pressure) < 140/90       CENTRUM SILVER ULTRA MENS PO           cholecalciferol 1000 UNIT tablet    vitamin D3     Take by mouth daily        clotrimazole 1 % cream    LOTRIMIN    30 g    Apply topically 2 times daily    Tinea corporis       Desloratadine-Pseudoephedrine 5-240 MG Tb24    CLARINEX-D 24 HOUR    30 tablet    Take 1 tablet by mouth daily as needed    Acute seasonal allergic rhinitis due to pollen       hydrocortisone 0.2 % cream    WESTCORT    60 g    Apply topically 2 times daily    Intertrigo       hydrocortisone 2.5 % ointment     30 g    Apply topically 2 times daily    Tinea corporis       LANsoprazole 30 MG CR capsule    PREVACID    90 capsule    Take 1 capsule (30 mg) by mouth daily    Gastroesophageal reflux disease without esophagitis       metoprolol 100 MG 24 hr tablet    TOPROL-XL    90 tablet    Take 1 tablet (100 mg) by mouth daily    Hypertension goal BP (blood pressure) < 140/90       ranitidine 300 MG tablet    ZANTAC    90 tablet    Take 1 tablet (300 mg) by mouth At Bedtime    Gastroesophageal reflux disease without esophagitis       * sildenafil 100 MG tablet     VIAGRA    6 tablet    Take 0.5-1 tablets ( mg) by mouth daily as needed for erectile dysfunction Take 30 min to 4 hours before intercourse.  Never use with nitroglycerin, terazosin or doxazosin.    ED (erectile dysfunction)       * sildenafil 100 MG tablet    VIAGRA    6 tablet    Take 0.5-1 tablets ( mg) by mouth daily as needed for erectile dysfunction Take 30 min to 4 hours before intercourse.  Never use with nitroglycerin, terazosin or doxazosin.    Erectile dysfunction due to arterial insufficiency       * Notice:  This list has 2 medication(s) that are the same as other medications prescribed for you. Read the directions carefully, and ask your doctor or other care provider to review them with you.

## 2017-12-12 NOTE — PROGRESS NOTES
Sleep Follow-Up Visit:    Date on this visit: 12/12/2017    Juan Jose Razo is a 66 year old male with PMH HTN, GERD, and severe BUCK comes in today for follow-up. HST was performed on 10/12/17 with an AHI of 32.7/hr.    Mr. Razo and I reviewed his download.  He denied problems with CPAP and said he feels much better since he's used it.  He denied significant leak, but said if he takes CPAP off to use bathroom at night, he just unhooks the masks and so it may appear as a leak on the download.  We discussed increasing pressures due to median pressure and 95th percentile pressure.    Past medical/surgical history, family history, social history, medications and allergies were reviewed.    CPAP download 11/12/17-12/11/17:    Used 30/30 days  Days with > 4 hours used 100%  avg use on days used 7 hours and 52 minutes  Residual AHI 4.0  95th percentile pressure 14.7  Median pressure 12.3  95th percentile leak 10.9    ESS: 4    Problem List:  Patient Active Problem List    Diagnosis Date Noted     Multiple benign nevi 09/06/2015     Priority: Medium     Seborrheic keratoses 09/06/2015     Priority: Medium     Skin cancer screening 09/06/2015     Priority: Medium     Skin exam, screening for cancer 10/24/2013     Priority: Medium     Phlebitis and thrombophlebitis of femoral vein (deep) (superficial), left 02/01/2013     Priority: Medium     IMO update changed this record. Please review for accuracy       Advanced directives, counseling/discussion 01/23/2013     Priority: Medium     Patient states has Advance Directive and will bring in a copy to clinic. 1/23/2013          CARDIOVASCULAR SCREENING; LDL GOAL LESS THAN 130 03/06/2012     Priority: Medium     Hypertension goal BP (blood pressure) < 140/90 03/06/2012     Priority: Medium     GERD (gastroesophageal reflux disease) 03/06/2012     Priority: Medium     Seasonal allergic rhinitis 03/06/2012     Priority: Medium     Physical Exam:  /80  Pulse 77  Resp 16  " Ht 1.905 m (6' 3\")  Wt 130.6 kg (288 lb)  SpO2 96%  BMI 36 kg/m2    General: No apparent distress, appropriately groomed  Chest: no cough  Psych: pleasant, mood/affect euthymic    Impression/Plan:    Severe Obstructive Sleep Apnea  Obesity    Mr. Juan Jose Razo is a 66 year old male with PMH HTN, GERD, and now severe BUCK with an AHI of 32.7.  He has had excellent compliance with 100% of days with >4 hours use in past 30 days.  Will increase pressure settings to 10-17 cm H20 as his median pressure is 12.3, and 95 percentile max is 14.7 cm H20.  Recommended to regularly replace supplies regularly and let us know of any problems or concerns.  We also discussed importance of weight loss.    Patient was strongly advised to avoid driving, operating any heavy machinery or other hazardous situations while drowsy or sleepy.  Patient was counseled on the importance of driving while alert, to pull over if drowsy, or nap before getting into the vehicle if sleepy.      Follow up 1 year  ?  Seen and examined with Dr. Briseno  ?  Ziyad Blount MD  Clinical Sleep Medicine Fellow  Pager #[phone]        Attending: As the attending physician I was present with  Dr. Ziyad Blount,  during this clinic visit. I personally reviewed the key aspects of the history and physical exam and I agree with the above documentation.    \"I spent a total of 15 minutes face to face with Juan Jose Razo during today's office   visit. Over 50% of this time was spent counseling the patient and  coordinating care regarding sleep apnea, CPAP treatment.\"       Ernesto Blankenship MD   of Medicine,  Division of Pulmonary/Sleep Medicine  [facility].    "

## 2017-12-12 NOTE — NURSING NOTE
"Chief Complaint   Patient presents with     CPAP Follow Up       Initial /80  Pulse 77  Resp 16  Ht 1.905 m (6' 3\")  Wt 130.6 kg (288 lb)  SpO2 96%  BMI 36 kg/m2 Estimated body mass index is 36 kg/(m^2) as calculated from the following:    Height as of this encounter: 1.905 m (6' 3\").    Weight as of this encounter: 130.6 kg (288 lb).  Medication Reconciliation: complete   Quita Looney [facility] ~[place]      "

## 2017-12-12 NOTE — NURSING NOTE
Pressure change done with Min at 10 and Max at 17 per orders.    Quita Looney [facility] ~[place]

## 2017-12-12 NOTE — PATIENT INSTRUCTIONS
Your BMI is Body mass index is 36 kg/(m^2).  Weight management is a personal decision.  If you are interested in exploring weight loss strategies, the following discussion covers the approaches that may be successful. Body mass index (BMI) is one way to tell whether you are at a healthy weight, overweight, or obese. It measures your weight in relation to your height.  A BMI of 18.5 to 24.9 is in the healthy range. A person with a BMI of 25 to 29.9 is considered overweight, and someone with a BMI of 30 or greater is considered obese. More than two-thirds of American adults are considered overweight or obese.  Being overweight or obese increases the risk for further weight gain. Excess weight may lead to heart disease and diabetes.  Creating and following plans for healthy eating and physical activity may help you improve your health.  Weight control is part of healthy lifestyle and includes exercise, emotional health, and healthy eating habits. Careful eating habits lifelong are the mainstay of weight control. Though there are significant health benefits from weight loss, long-term weight loss with diet alone may be very difficult to achieve- studies show long-term success with dietary management in less than 10% of people. Attaining a healthy weight may be especially difficult to achieve in those with severe obesity. In some cases, medications, devices and surgical management might be considered.  What can you do?  If you are overweight or obese and are interested in methods for weight loss, you should discuss this with your provider.     Consider reducing daily calorie intake by 500 calories.     Keep a food journal.     Avoiding skipping meals, consider cutting portions instead.    Diet combined with exercise helps maintain muscle while optimizing fat loss. Strength training is particularly important for building and maintaining muscle mass. Exercise helps reduce stress, increase energy, and improves fitness.  Increasing exercise without diet control, however, may not burn enough calories to loose weight.       Start walking three days a week 10-20 minutes at a time    Work towards walking thirty minutes five days a week     Eventually, increase the speed of your walking for 1-2 minutes at time    In addition, we recommend that you review healthy lifestyles and methods for weight loss available through the National Institutes of Health patient information sites:  http://win.niddk.nih.gov/publications/index.htm    And look into health and wellness programs that may be available through your health insurance provider, employer, local community center, or [employer] club.    Weight management plan: Patient was referred to their PCP to discuss a diet and exercise plan.

## 2018-01-22 DIAGNOSIS — L30.4 INTERTRIGO: ICD-10-CM

## 2018-01-22 RX ORDER — HYDROCORTISONE VALERATE CREAM 2 MG/G
CREAM TOPICAL 2 TIMES DAILY
Qty: 60 G | Refills: 1 | Status: SHIPPED | OUTPATIENT
Start: 2018-01-22 | End: 2019-10-24

## 2018-01-22 RX ORDER — NYSTATIN 100000 U/G
CREAM TOPICAL 3 TIMES DAILY
Qty: 60 G | Refills: 1 | Status: SHIPPED | OUTPATIENT
Start: 2018-01-22 | End: 2019-10-24

## 2018-01-22 NOTE — TELEPHONE ENCOUNTER
Last apt: 11/28/16  Future apt: 3/12/18     ASSESSMENT AND PLAN:   1. 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 Continue annual exams.      2. Benign appearing pigmented nevi, no concerning features on dermoscopy. ABCDES. Return to clinic if seeing changes.    3. Seborrheic Keratosis- no treatment needed.   4. Eruption consistent with intertrigo, at this point I would use nystatin cream tid and hydrocortisone 0.2% cream twice daily to affected areas. Try to keep skin as dry as possible. He may have had an irritant or allergic contact dermatitis or even secondary staph infection prior to this presentation.  5. Inflamed acrochordon to his neck x 2.  Because this is bothersome and irritating and causing inflammation and pain, will treat in office with cryotherapy.  Cryotherapy procedure note (performed by faculty): After verbal consent and discussion of risks and benefits including but no limited to dyspigmentation/scar, blister, infection, recurrence, the inflamed acrochordon was treated with 1-2mm freeze border for 2 cycles with liquid nitrogen. Post cryotherapy instructions were provided.     6. Scalp cyst. Cyst management options were reviewed. The patient elects for excision due to clinical symptoms. Patient recommended scheduling in dermatology surgery.   All risks, benefits and alternatives were discussed with patient.  Patient is in agreement and understands the assessment and plan.  All questions were answered.  Sun Screen Education was given.   Return to Clinic in one month or sooner as needed.

## 2018-01-22 NOTE — TELEPHONE ENCOUNTER
Last apt: 2/9/17  Future apt: 3/12/18     REPAIR:  In order to repair this defect while maintaining the normal anatomic relations and function, we elected to utilize an intermediate linear closure.  Closure was oriented so that the wound was in the patient's natural skin tension lines.  Deeper layers of the subcutaneous tissue were undermined first.  Deep dermal and subcutaneous layer closure was performed using two 3-0 Vicryl deep, intradermal and subcutaneous buried vertical mattresssutures.  Two redundant columns were removed by triangulation.  Final cutaneous approximation was achieved with three 3-0 Prolene simple interrupted sutures.      The final wound length was 2.0 cm.  A total of 9 ml of anesthesia was administered for all surgical sites.  Estimated blood loss was less than 1 ml for all surgical sites.  A sterile pressure dressing was applied and wound care instructions, with a written handout, were given.  The patient was [facility] alert and ambulatory.     Suture removal: 2 weeks

## 2018-01-22 NOTE — TELEPHONE ENCOUNTER
Received a refill request for Nystatin cream as the resident on call. Patient was last seen 12/29/16 by Imelda Rincon for intertrigo and has upcoming appt 3/2/18. After reviewing the patient's chart and the assessment and plan, refill request was accepted.     Denia Lucia MD  PGY-4 Dermatology  Pager: [phone]

## 2018-02-08 DIAGNOSIS — K21.9 GASTROESOPHAGEAL REFLUX DISEASE, ESOPHAGITIS PRESENCE NOT SPECIFIED: ICD-10-CM

## 2018-02-08 RX ORDER — LANSOPRAZOLE 30 MG/1
CAPSULE, DELAYED RELEASE ORAL
Qty: 90 CAPSULE | Refills: 3 | OUTPATIENT
Start: 2018-02-08

## 2018-02-08 NOTE — TELEPHONE ENCOUNTER
Patient should have refills through June 2018. Confirmed with Pharmacy. Will cancel request.    Thanks! Quita Greenfield RN

## 2018-03-12 ENCOUNTER — OFFICE VISIT (OUTPATIENT)
Dept: DERMATOLOGY | Facility: CLINIC | Age: 68
End: 2018-03-12
Payer: COMMERCIAL

## 2018-03-12 DIAGNOSIS — L91.8 INFLAMED ACROCHORDON: ICD-10-CM

## 2018-03-12 DIAGNOSIS — L82.1 SEBORRHEIC KERATOSES: Primary | ICD-10-CM

## 2018-03-12 DIAGNOSIS — D22.9 MULTIPLE BENIGN NEVI: ICD-10-CM

## 2018-03-12 DIAGNOSIS — L30.4 INTERTRIGO: ICD-10-CM

## 2018-03-12 DIAGNOSIS — Z12.83 SKIN EXAM, SCREENING FOR CANCER: ICD-10-CM

## 2018-03-12 DIAGNOSIS — D48.9 NEOPLASM OF UNCERTAIN BEHAVIOR: ICD-10-CM

## 2018-03-12 RX ORDER — HYDROCORTISONE 25 MG/G
OINTMENT TOPICAL 2 TIMES DAILY
Qty: 30 G | Refills: 1 | Status: SHIPPED | OUTPATIENT
Start: 2018-03-12 | End: 2018-08-10

## 2018-03-12 ASSESSMENT — PAIN SCALES - GENERAL
PAINLEVEL: NO PAIN (0)
PAINLEVEL: NO PAIN (0)

## 2018-03-12 NOTE — PROGRESS NOTES
[facility] Dermatology Note    Dermatology Problem List:  1. 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 Hx of dysplastic nevus, pt reported  3. Onychomycosis  4. AKs  5. 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 Intertrigo in groin  - s/p hydrocortisone 0.2% cream, nystatin cream  - hydrocortisone 2.5% ointment  7. NUB, right lateral neck s/p biopsy 3/12/18  8. Skin cancer screening 3/12/18    CC:   Chief Complaint   Patient presents with     Skin Check     Skin check, two lesions of concern noted on neck area.     Date of Service: Mar 12, 2018    History of Present Illness:  Mr. Juan Jose Razo is a 67 year old male who presents for a skin check. The patient was last seen on 2/9/17 by Dr. Cunha when a cyst was excised on the left occipital scalp. Today the patient reports he has two lesions of concern on his neck area. The lesions catch on clothing and also when he shaves. They are very bothersome. He also reports that he still has irritation and redness in his groin. He states that the hydrocortisone and nystatin cream works well, but they were very expensive due to insurance changes. He has used over the counter products which have worked. Overall he is feeling well. The patient reports no other lesions of concern at this time.    Otherwise, the patient reports no painful, bleeding, nonhealing, or pruritic lesions, and denies new or changing moles.    Past Medical History:   Patient Active Problem List   Diagnosis     CARDIOVASCULAR SCREENING; LDL GOAL LESS THAN 130     Hypertension goal BP (blood pressure) < 140/90     GERD (gastroesophageal reflux disease)     Seasonal allergic rhinitis     Advanced directives, counseling/discussion     Phlebitis and thrombophlebitis of femoral vein (deep) (superficial), left     Skin exam, screening for cancer     Multiple benign nevi     Seborrheic keratoses     Skin cancer screening     Past Medical History:   Diagnosis Date      Acid reflux      Allergies      HTN (hypertension) 1990     Squamous cell carcinoma      Varicose veins      Past Surgical History:   Procedure Laterality Date     COLONOSCOPY  2001, 2006, 2010    polyp     keratinous cyst  12/09     LIGATE VEIN VARICOSE, PHLEBECTOMY MULTIPLE STAB, COMBINED  1995     MOHS MICROGRAPHIC PROCEDURE       NO HISTORY OF SURGERY  10/24/13    derm     Social History:  Not addressed at this visit.    Family History:  Not addressed at this visit.    Medications:  Current Outpatient Prescriptions   Medication Sig Dispense Refill     nystatin (MYCOSTATIN) cream Apply topically 3 times daily 60 g 1     hydrocortisone (WESTCORT) 0.2 % cream Apply topically 2 times daily 60 g 1     Desloratadine-Pseudoephedrine (CLARINEX-D 24 HOUR) 5-240 MG TB24 Take 1 tablet by mouth daily as needed 30 tablet 2     sildenafil (VIAGRA) 100 MG cap/tab Take 0.5-1 tablets ( mg) by mouth daily as needed for erectile dysfunction Take 30 min to 4 hours before intercourse.  Never use with nitroglycerin, terazosin or doxazosin. 6 tablet 1     clotrimazole (LOTRIMIN) 1 % cream Apply topically 2 times daily 30 g 1     ranitidine (ZANTAC) 300 MG tablet Take 1 tablet (300 mg) by mouth At Bedtime 90 tablet 3     LANsoprazole (PREVACID) 30 MG CR capsule Take 1 capsule (30 mg) by mouth daily 90 capsule 3     amLODIPine (NORVASC) 5 MG tablet Take 2 tablets (10 mg) by mouth daily 180 tablet 3     metoprolol (TOPROL-XL) 100 MG 24 hr tablet Take 1 tablet (100 mg) by mouth daily 90 tablet 3     hydrocortisone 2.5 % ointment Apply topically 2 times daily 30 g 1     cholecalciferol (VITAMIN D3) 1000 UNIT tablet Take by mouth daily       Multiple Vitamins-Minerals (CENTRUM SILVER ULTRA MENS PO)        sildenafil (VIAGRA) 100 MG tablet Take 0.5-1 tablets ( mg) by mouth daily as needed for erectile dysfunction Take 30 min to 4 hours before intercourse.  Never use with nitroglycerin, terazosin or doxazosin. 6 tablet 3      Allergies:  No Known Allergies     Review of Systems:  - Skin: As above in HPI. No additional skin concerns.    Physical exam:  Vitals: There were no vitals taken for this visit.  GEN: This is a well developed, well-nourished male in no acute distress, in a pleasant mood.      SKIN: Full skin, which includes the head/face, both arms, chest, back, abdomen,both legs, genitalia and/or groin buttocks, digits and/or nails, was examined.  - 2 mm skin colored papule with mild erythema on the left anterior neck  - Stuck on tan to brown papules on the trunk, extremities  - Regular brown pigmented macules and papules are identified on the trunk, extremities.  - 4 mm pink scaly papule on the right lateral neck  - Faint erythema on the inguinal folds  - No other lesions of concern on areas examined.     Impression/Plan:  1. Hx of SCC on left temple.  - No evidence of recurrence on left temple. Continue regular skin checks.    2. Inflamed acrochordon - left anterior neck  - Skin tag snip: 1 skin tag. Cleaned with alcohol and removed with sheers. Patient tolerated procedure well.    3. Seborrheic keratoses - trunk, extremities  - No further intervention required. Patient to report changes.     4. Clinically benign nevi - trunk, extremities  - No further intervention required. Patient to report changes.   - Sun precaution was advised including the use of sun screens of SPF 30 or higher, sun protective clothing, and avoidance of tanning beds.    5. NUB - right lateral neck. Neoplasm of uncertain behavior. Differential diagnosis includes verrucous keratosis vs SCC vs other.  - Shave biopsy:  After discussion of benefits and risks including but not limited to bleeding/bruising, pain/swelling, infection, scar, incomplete removal, nerve damage/numbness, recurrence, and non-diagnostic biopsy, written consent, verbal consent and photographs were obtained. Time-out was performed. The area was cleaned with isopropyl alcohol and was  injected to obtain adequate anesthesia of the lesion on the right lateral neck. 1.0 ml of 1% lidocaine was injected to obtain adequate anesthesia. A shave biopsy was performed. Hemostasis was achieved with aluminium chloride. Vaseline and a sterile dressing were applied. The patient tolerated the procedure and no complications were noted. The patient was provided with verbal and written post care instructions.      6. Intertrigo, groin  - Patient stopped hydrocortisone 0.2% cream and nystatin cream as this is not covered with insurance.  - Start hydrocortisone 2.5% ointment - apply topically 2 times daily.    Follow-up in 1 year, earlier for new or changing lesions.    Staff Involved:  Staff Only    Scribe Disclosure:   IAdam, am serving as a scribe to document services personally performed by Imelda Rincon PA-C, based on data collection and the provider's statements to me.    Provider Disclosure:   The documentation recorded by the scribe accurately reflects the services I personally performed and the decisions made by me.    All risks, benefits and alternatives were discussed with patient.  Patient is in agreement and understands the assessment and plan.  All questions were answered.  Sun Screen Education was given.   Return to Clinic annually or sooner as needed.   Imelda Rincno PA-C   [facility]

## 2018-03-12 NOTE — PATIENT INSTRUCTIONS

## 2018-03-12 NOTE — MR AVS SNAPSHOT
After Visit Summary   3/12/2018    Juan Jose Razo    MRN: [id number]           Patient Information     Date Of Birth          1950        Visit Information        Provider Department      3/12/2018 9:45 AM Imelda Rincon PA-C [facility] Dermatology        Today's Diagnoses     Seborrheic keratoses    -  1    Skin exam, screening for cancer        Multiple benign nevi        Inflamed acrochordon        Neoplasm of uncertain behavior        Intertrigo          Care Instructions    Wound Care After a Biopsy    What is a skin biopsy?  A skin biopsy allows the doctor to examine a very small piece of tissue under the microscope to determine the diagnosis and the best treatment for the skin condition. A local anesthetic (numbing medicine)  is injected with a very small needle into the skin area to be tested. A small piece of skin is taken from the area. Sometimes a suture (stitch) is used.     What are the risks of a skin biopsy?  I will experience scar, bleeding, swelling, pain, crusting and redness. I may experience incomplete removal or recurrence. Risks of this procedure are excessive bleeding, bruising, infection, nerve damage, numbness, thick (hypertrophic or keloidal) scar and non-diagnostic biopsy.    How should I care for my wound for the first 24 hours?    Keep the wound dry and covered for 24 hours    If it bleeds, hold direct pressure on the area for 15 minutes. If bleeding does not stop then go to the emergency room    Avoid strenuous exercise the first 1-2 days or as your doctor instructs you    How should I care for the wound after 24 hours?    After 24 hours, remove the bandage    You may bathe or shower as normal    If you had a scalp biopsy, you can shampoo as usual and can use shower water to clean the biopsy site daily    Clean the wound twice a day with gentle soap and water    Do not scrub, be gentle    Apply white petroleum/Vaseline after cleaning the wound with a cotton  swab or a clean finger, and keep the site covered with a Bandaid /bandage. Bandages are not necessary with a scalp biopsy    If you are unable to cover the site with a Bandaid /bandage, re-apply ointment 2-3 times a day to keep the site moist. Moisture will help with healing    Avoid strenuous activity for first 1-2 days    Avoid lakes, rivers, pools, and oceans until the stitches are removed or the site is healed    How do I clean my wound?    Wash hands thoroughly with soap or use hand  before all wound care    Clean the wound with gentle soap and water    Apply white petroleum/Vaseline  to wound after it is clean    Replace the Bandaid /bandage to keep the wound covered for the first few days or as instructed by your doctor    If you had a scalp biopsy, warm shower water to the area on a daily basis should suffice    What should I use to clean my wound?     Cotton-tipped applicators (Qtips )    White petroleum jelly (Vaseline ). Use a clean new container and use Q-tips to apply.    Bandaids   as needed    Gentle soap     How should I care for my wound long term?    Do not get your wound dirty    Keep up with wound care for one week or until the area is healed.    A small scab will form and fall off by itself when the area is completely healed. The area will be red and will become pink in color as it heals. Sun protection is very important for how your scar will turn out. Sunscreen with an SPF 30 or greater is recommended once the area is healed.    You should have some soreness but it should be mild and slowly go away over several days. Talk to your doctor about using tylenol for pain,    When should I call my doctor?  If you have increased:     Pain or swelling    Pus or drainage (clear or slightly yellow drainage is ok)    Temperature over 100F    Spreading redness or warmth around wound    When will I hear about my results?  The biopsy results can take 2-3 weeks to come back. The clinic will call you  with the results, send you a Qardio message, or have you schedule a follow-up clinic or phone time to discuss the results. Contact our clinics if you do not hear from us in 3 weeks.     Who should I call with questions?    [facility]: [phone]     [facility]: [phone]    For urgent needs outside of business hours call the [facility] at [phone] and ask for the dermatology resident on call          Follow-ups after your visit        Follow-up notes from your care team     Return in about 1 year (around 3/12/2019).      Who to contact     Please call your clinic at [phone] to:    Ask questions about your health    Make or cancel appointments    Discuss your medicines    Learn about your test results    Speak to your doctor            Additional Information About Your Visit        [employer] Information     [employer] gives you secure access to your electronic health record. If you see a primary care provider, you can also send messages to your care team and make appointments. If you have questions, please call your primary care clinic.  If you do not have a primary care provider, please call [phone] and they will assist you.      [employer] is an electronic gateway that provides easy, online access to your medical records. With [employer], you can request a clinic appointment, read your test results, renew a prescription or communicate with your care team.     To access your existing account, please contact your [facility] or call [phone] for assistance.        Care EveryWhere ID     This is your Care EveryWhere ID. This could be used by other organizations to access your [place] medical records  [id number]         Blood Pressure from Last 3 Encounters:   12/12/17 126/80   10/24/17 133/80   10/11/17 126/86    Weight from Last 3 Encounters:   12/12/17 130.6 kg (288 lb)   10/24/17 127.5 kg (281 lb)    10/11/17 127.5 kg (281 lb)              We Performed the Following     BIOPSY SKIN/SUBQ/MUC MEM, SINGLE LESION     Surgical pathology exam          Where to get your medicines      These medications were sent to [facility] [place], [place] - [address]  [address] [facility] [address]     Phone:  [phone]     hydrocortisone 2.5 % ointment          Primary Care Provider Office Phone # Fax #    Alphonso Ceasar Grover [phone]       [address]  [facility] [address]        Equal Access to Services     [facility]: Hadii aad ku hadasho Soomaali, waaxda luqadaha, qaybta kaalmada adeegyada, jumana morse hayjodie kelly . So [facility] [phone].    ATENCIÓN: Si habla español, tiene a lo disposición servicios gratuitos de asistencia lingüística. Llame al [phone].    We comply with applicable federal civil rights laws and [place] laws. We do not discriminate on the basis of race, color, national origin, age, disability, sex, sexual orientation, or gender identity.            Thank you!     Thank you for choosing [facility]  for your care. Our goal is always to provide you with excellent care. Hearing back from our patients is one way we can continue to improve our services. Please take a few minutes to complete the written survey that you may receive in the mail after your visit with us. Thank you!             Your Updated Medication List - Protect others around you: Learn how to safely use, store and throw away your medicines at www.disposemymeds.org.          This list is accurate as of 3/12/18  6:49 PM.  Always use your most recent med list.                   Brand Name Dispense Instructions for use Diagnosis    amLODIPine 5 MG tablet    NORVASC    180 tablet    Take 2 tablets (10 mg) by mouth daily    Hypertension goal BP (blood pressure) < 140/90       CENTRUM SILVER ULTRA MENS PO            cholecalciferol 1000 UNIT tablet    vitamin D3     Take by mouth daily        clotrimazole 1 % cream    LOTRIMIN    30 g    Apply topically 2 times daily    Tinea corporis       Desloratadine-Pseudoephedrine 5-240 MG Tb24    CLARINEX-D 24 HOUR    30 tablet    Take 1 tablet by mouth daily as needed    Acute seasonal allergic rhinitis due to pollen       hydrocortisone 0.2 % cream    WESTCORT    60 g    Apply topically 2 times daily    Intertrigo       hydrocortisone 2.5 % ointment     30 g    Apply topically 2 times daily        LANsoprazole 30 MG CR capsule    PREVACID    90 capsule    Take 1 capsule (30 mg) by mouth daily    Gastroesophageal reflux disease without esophagitis       metoprolol succinate 100 MG 24 hr tablet    TOPROL-XL    90 tablet    Take 1 tablet (100 mg) by mouth daily    Hypertension goal BP (blood pressure) < 140/90       nystatin cream    MYCOSTATIN    60 g    Apply topically 3 times daily    Intertrigo       ranitidine 300 MG tablet    ZANTAC    90 tablet    Take 1 tablet (300 mg) by mouth At Bedtime    Gastroesophageal reflux disease without esophagitis       * sildenafil 100 MG tablet    VIAGRA    6 tablet    Take 0.5-1 tablets ( mg) by mouth daily as needed for erectile dysfunction Take 30 min to 4 hours before intercourse.  Never use with nitroglycerin, terazosin or doxazosin.    ED (erectile dysfunction)       * sildenafil 100 MG tablet    VIAGRA    6 tablet    Take 0.5-1 tablets ( mg) by mouth daily as needed for erectile dysfunction Take 30 min to 4 hours before intercourse.  Never use with nitroglycerin, terazosin or doxazosin.    Erectile dysfunction due to arterial insufficiency       * Notice:  This list has 2 medication(s) that are the same as other medications prescribed for you. Read the directions carefully, and ask your doctor or other care provider to review them with you.

## 2018-03-12 NOTE — LETTER
3/12/2018       RE: Juan Jose Razo  [address] [facility] [address]     Dear Colleague,    Thank you for referring your patient, Juan Jose Razo, to the [facility] DERMATOLOGY at [facility]. Please see a copy of my visit note below.    [facility] Dermatology Note    Dermatology Problem List:  1. 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 Hx of dysplastic nevus, pt reported  3. Onychomycosis  4. AKs  5. 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 Intertrigo in groin  - s/p hydrocortisone 0.2% cream, nystatin cream  - hydrocortisone 2.5% ointment  7. NUB, right lateral neck s/p biopsy 3/12/18  8. Skin cancer screening 3/12/18    CC:   Chief Complaint   Patient presents with     Skin Check     Skin check, two lesions of concern noted on neck area.     Date of Service: Mar 12, 2018    History of Present Illness:  Mr. Juan Jose Razo is a 67 year old male who presents for a skin check. The patient was last seen on 2/9/17 by Dr. Cunha when a cyst was excised on the left occipital scalp. Today the patient reports he has two lesions of concern on his neck area. The lesions catch on clothing and also when he shaves. They are very bothersome. He also reports that he still has irritation and redness in his groin. He states that the hydrocortisone and nystatin cream works well, but they were very expensive due to insurance changes. He has used over the counter products which have worked. Overall he is feeling well. The patient reports no other lesions of concern at this time.    Otherwise, the patient reports no painful, bleeding, nonhealing, or pruritic lesions, and denies new or changing moles.    Past Medical History:   Patient Active Problem List   Diagnosis     CARDIOVASCULAR SCREENING; LDL GOAL LESS THAN 130     Hypertension goal BP (blood pressure) < 140/90     GERD (gastroesophageal reflux disease)     Seasonal allergic rhinitis      Advanced directives, counseling/discussion     Phlebitis and thrombophlebitis of femoral vein (deep) (superficial), left     Skin exam, screening for cancer     Multiple benign nevi     Seborrheic keratoses     Skin cancer screening     Past Medical History:   Diagnosis Date     Acid reflux      Allergies      HTN (hypertension) 1990     Squamous cell carcinoma      Varicose veins      Past Surgical History:   Procedure Laterality Date     COLONOSCOPY  2001, 2006, 2010    polyp     keratinous cyst  12/09     LIGATE VEIN VARICOSE, PHLEBECTOMY MULTIPLE STAB, COMBINED  1995     MOHS MICROGRAPHIC PROCEDURE       NO HISTORY OF SURGERY  10/24/13    derm     Social History:  Not addressed at this visit.    Family History:  Not addressed at this visit.    Medications:  Current Outpatient Prescriptions   Medication Sig Dispense Refill     nystatin (MYCOSTATIN) cream Apply topically 3 times daily 60 g 1     hydrocortisone (WESTCORT) 0.2 % cream Apply topically 2 times daily 60 g 1     Desloratadine-Pseudoephedrine (CLARINEX-D 24 HOUR) 5-240 MG TB24 Take 1 tablet by mouth daily as needed 30 tablet 2     sildenafil (VIAGRA) 100 MG cap/tab Take 0.5-1 tablets ( mg) by mouth daily as needed for erectile dysfunction Take 30 min to 4 hours before intercourse.  Never use with nitroglycerin, terazosin or doxazosin. 6 tablet 1     clotrimazole (LOTRIMIN) 1 % cream Apply topically 2 times daily 30 g 1     ranitidine (ZANTAC) 300 MG tablet Take 1 tablet (300 mg) by mouth At Bedtime 90 tablet 3     LANsoprazole (PREVACID) 30 MG CR capsule Take 1 capsule (30 mg) by mouth daily 90 capsule 3     amLODIPine (NORVASC) 5 MG tablet Take 2 tablets (10 mg) by mouth daily 180 tablet 3     metoprolol (TOPROL-XL) 100 MG 24 hr tablet Take 1 tablet (100 mg) by mouth daily 90 tablet 3     hydrocortisone 2.5 % ointment Apply topically 2 times daily 30 g 1     cholecalciferol (VITAMIN D3) 1000 UNIT tablet Take by mouth daily       Multiple  Vitamins-Minerals (CENTRUM SILVER ULTRA MENS PO)        sildenafil (VIAGRA) 100 MG tablet Take 0.5-1 tablets ( mg) by mouth daily as needed for erectile dysfunction Take 30 min to 4 hours before intercourse.  Never use with nitroglycerin, terazosin or doxazosin. 6 tablet 3     Allergies:  No Known Allergies     Review of Systems:  - Skin: As above in HPI. No additional skin concerns.    Physical exam:  Vitals: There were no vitals taken for this visit.  GEN: This is a well developed, well-nourished male in no acute distress, in a pleasant mood.      SKIN: Full skin, which includes the head/face, both arms, chest, back, abdomen,both legs, genitalia and/or groin buttocks, digits and/or nails, was examined.  - 2 mm skin colored papule with mild erythema on the left anterior neck  - Stuck on tan to brown papules on the trunk, extremities  - Regular brown pigmented macules and papules are identified on the trunk, extremities.  - 4 mm pink scaly papule on the right lateral neck  - Faint erythema on the inguinal folds  - No other lesions of concern on areas examined.     Impression/Plan:  1. Hx of SCC on left temple.  - No evidence of recurrence on left temple. Continue regular skin checks.    2. Inflamed acrochordon - left anterior neck  - Skin tag snip: 1 skin tag. Cleaned with alcohol and removed with sheers. Patient tolerated procedure well.    3. Seborrheic keratoses - trunk, extremities  - No further intervention required. Patient to report changes.     4. Clinically benign nevi - trunk, extremities  - No further intervention required. Patient to report changes.   - Sun precaution was advised including the use of sun screens of SPF 30 or higher, sun protective clothing, and avoidance of tanning beds.    5. NUB - right lateral neck. Neoplasm of uncertain behavior. Differential diagnosis includes verrucous keratosis vs SCC vs other.  - Shave biopsy:  After discussion of benefits and risks including but not  limited to bleeding/bruising, pain/swelling, infection, scar, incomplete removal, nerve damage/numbness, recurrence, and non-diagnostic biopsy, written consent, verbal consent and photographs were obtained. Time-out was performed. The area was cleaned with isopropyl alcohol and was injected to obtain adequate anesthesia of the lesion on the right lateral neck. 1.0 ml of 1% lidocaine was injected to obtain adequate anesthesia. A shave biopsy was performed. Hemostasis was achieved with aluminium chloride. Vaseline and a sterile dressing were applied. The patient tolerated the procedure and no complications were noted. The patient was provided with verbal and written post care instructions.      6. Intertrigo, groin  - Patient stopped hydrocortisone 0.2% cream and nystatin cream as this is not covered with insurance.  - Start hydrocortisone 2.5% ointment - apply topically 2 times daily.    Follow-up in 1 year, earlier for new or changing lesions.    Staff Involved:  Staff Only    Scribe Disclosure:   Adam SHAH, am serving as a scribe to document services personally performed by Imelda Rincon PA-C, based on data collection and the provider's statements to me.    Provider Disclosure:   The documentation recorded by the scribe accurately reflects the services I personally performed and the decisions made by me.    All risks, benefits and alternatives were discussed with patient.  Patient is in agreement and understands the assessment and plan.  All questions were answered.  Sun Screen Education was given.   Return to Clinic annually or sooner as needed.   Imelda Rincon PA-C   [facility]

## 2018-03-12 NOTE — NURSING NOTE
Dermatology Rooming Note    Juan Jose Razo's goals for this visit include:   Chief Complaint   Patient presents with     Skin Check     Skin check, two lesions of concern noted on neck area.     Iza Mobley LPN

## 2018-03-13 LAB — COPATH REPORT: NORMAL

## 2018-04-25 ENCOUNTER — DOCUMENTATION ONLY (OUTPATIENT)
Dept: SLEEP MEDICINE | Facility: CLINIC | Age: 68
End: 2018-04-25

## 2018-04-25 NOTE — PROGRESS NOTES
6 month [facility] Recheck Visit     Data only recheck     Assessment: Pt meeting objective benchmarks.     Action plan: pt to follow up per provider request (1-2 yrs)    Diagnostic AHI: 32.7    Device type: Auto-CPAP  PAP settings: CPAP min 10 cm  H20     CPAP max 17 cm  H20    95th% pressure 15.9 cm  H20   Objective measures: 14 day rolling measures      Compliance  100 %      Leak  7.2 lpm  last  upload      AHI 2.52   last  upload      Average number of minutes 479      Objective measure goal  Compliance   Goal >70%  Leak   Goal < 24 lpm  AHI  Goal < 5  Usage  Goal >240

## 2018-05-23 ENCOUNTER — OFFICE VISIT (OUTPATIENT)
Dept: FAMILY MEDICINE | Facility: CLINIC | Age: 68
End: 2018-05-23
Payer: COMMERCIAL

## 2018-05-23 ENCOUNTER — TELEPHONE (OUTPATIENT)
Dept: FAMILY MEDICINE | Facility: CLINIC | Age: 68
End: 2018-05-23

## 2018-05-23 VITALS
WEIGHT: 285.5 LBS | HEART RATE: 78 BPM | DIASTOLIC BLOOD PRESSURE: 85 MMHG | BODY MASS INDEX: 35.69 KG/M2 | RESPIRATION RATE: 16 BRPM | OXYGEN SATURATION: 97 % | SYSTOLIC BLOOD PRESSURE: 134 MMHG | TEMPERATURE: 97.9 F

## 2018-05-23 DIAGNOSIS — H10.32 ACUTE CONJUNCTIVITIS OF LEFT EYE, UNSPECIFIED ACUTE CONJUNCTIVITIS TYPE: Primary | ICD-10-CM

## 2018-05-23 PROBLEM — E66.01 MORBID OBESITY (H): Status: ACTIVE | Noted: 2018-05-23

## 2018-05-23 PROCEDURE — 99213 OFFICE O/P EST LOW 20 MIN: CPT | Performed by: FAMILY MEDICINE

## 2018-05-23 RX ORDER — POLYMYXIN B SULFATE AND TRIMETHOPRIM 1; 10000 MG/ML; [USP'U]/ML
1 SOLUTION OPHTHALMIC
Qty: 1 BOTTLE | Refills: 0 | Status: SHIPPED | OUTPATIENT
Start: 2018-05-23 | End: 2018-05-30

## 2018-05-23 NOTE — MR AVS SNAPSHOT
After Visit Summary   5/23/2018    Juan Jose Razo    MRN: [id number]           Patient Information     Date Of Birth          1950        Visit Information        Provider Department      5/23/2018 10:20 AM Yvonne Wu MD [facility]        Today's Diagnoses     Acute conjunctivitis of left eye, unspecified acute conjunctivitis type    -  1      Care Instructions      Conjunctivitis, Nonspecific    The membrane that covers the white part of your eye (the conjunctiva) is inflamed. Inflammation happens when your body responds to an injury, allergic reaction, infection, or illness. Symptoms of inflammation in the eye may include redness, irritation, itching, swelling, or burning. These symptoms should go away within the next 24 hours. Conjunctivitis may be related to a particle that was in your eye. If so, it may wash out with your tears or irrigation treatment. Being exposed to liquid chemicals or fumes may also cause this reaction.   Home care    Apply a cold pack over the eye for 20 minutes at a time. This will reduce pain. To make a cold pack, put ice cubes in a plastic bag that seals at the top. Wrap the bag in a clean, thin towel or cloth.    Artificial tears may be prescribed to reduce irritation or redness.  These should be used 3 to 4 times a day.    You may use acetaminophen or ibuprofen to control pain, unless another medicine was prescribed. (Note: If you have chronic liver or kidney disease, or if you have ever had a stomach ulcer or gastrointestinal bleeding, talk with your healthcare provider before using these medicines.)  Follow-up care  Follow up with your healthcare provider, or as advised.  When to seek medical advice  Call your healthcare provider right away if any of these occur:    Increased eyelid swelling    Increased eye pain    Increased redness or drainage from the eye    Increased blurry vision or increased sensitivity to light    Failure of normal  vision to return within 24 to 48 hours  Date Last Reviewed: 7/1/2017 2000-2017 [employer]. [address] [place] [address], [place] [address]. All rights reserved. This information is not intended as a substitute for professional medical care. Always follow your healthcare professional's instructions.                Follow-ups after your visit        Additional Services     OPHTHALMOLOGY ADULT REFERRAL       Your provider has referred you to: [facility]: Eye Clinic - [place] [phone]   [email or web address]  FHN: [facility] - [place] [phone]   http://[employer]/  [facility] [phone]   http://[employer]/  [place] [phone]   http://[employer]/  [facility] [place] [phone]   http://[employer]/  [place] [phone]   http://[employer]/  [place] [phone]   http://[employer]/  [place] [phone]   http://[employer]/  [place] [phone]   http://[employer]/  N: [facility]  [place] [phone]   http://www.[employer]/  [place] [phone]   http://www.[employer]/  [place] [phone]   http://www.[employer]/  [place] [phone]   http://www.[employer]/  [place] [phone]   http://www.[employer]/  [facility] [phone]   http://www.[employer]/  [place] [phone]   http://www.[employer]/  [place] [phone]   http://www.[employer]/  [place] [phone]   http://www.[employer]/  N: [facility] [phone]   http://www.[employer]/  [facility] [phone]   http://www.[employer]/  [place]  [phone]   http://www.[email or web address]/  [place] [phone]   http://www.[employer]/  [place] [phone]   http://www.[email or web address]/  FHN: [facility] -  [place] [phone]   [email or web address]    Please be aware that coverage of these services is subject to the terms and limitations of your health insurance plan.  Call member services at your health plan with any benefit or coverage questions.      Please bring the following with you to your appointment:    (1) Any X-Rays, CTs or MRIs which have been performed.  Contact the facility where they were done to arrange for  prior to your scheduled appointment.    (2) List of current medications  (3) This referral request   (4) Any documents/labs given to you for this referral                  Who to contact     If you have questions or need follow up information about today's clinic visit or your schedule please contact [facility] directly at [phone].  Normal or non-critical lab and imaging results will be communicated to you by MyChart, letter or phone within 4 business days after the clinic has received the results. If you do not hear from us within 7 days, please contact the clinic through "[employer]hart or phone. If you have a critical or abnormal lab result, we will notify you by phone as soon as possible.  Submit refill requests through [employer] or call your pharmacy and they will forward the refill request to us. Please allow 3 business days for your refill to be completed.          Additional Information About Your Visit        "[employer]hart Information     [employer] gives you secure access to your electronic health record. If you see a primary care provider, you can also send messages to your care team and make appointments. If you have questions, please call your primary care clinic.  If you do not have a primary care provider, please call [phone] and they will assist you.        Care EveryWhere ID     This is your Care EveryWhere ID. This could be used by other organizations to access your [facility] medical records  [id number]        Your Vitals Were      Pulse Temperature Respirations Pulse Oximetry BMI (Body Mass Index)       78 97.9  F (36.6  C) (Oral) 16 97% 35.69 kg/m2        Blood Pressure from Last 3 Encounters:   05/23/18 134/85   12/12/17 126/80   10/24/17 133/80    Weight from Last 3 Encounters:   05/23/18 285 lb 8 oz (129.5 kg)   12/12/17 288 lb (130.6 kg)   10/24/17 281 lb (127.5 kg)              We Performed the Following     OPHTHALMOLOGY ADULT REFERRAL          Today's Medication Changes          These changes are accurate as of 5/23/18 10:58 AM.  If you have any questions, ask your nurse or doctor.               Start taking these medicines.        Dose/Directions    trimethoprim-polymyxin b ophthalmic solution   Commonly known as:  POLYTRIM   Used for:  Acute conjunctivitis of left eye, unspecified acute conjunctivitis type   Started by:  Yvonne Wu MD        Dose:  1 drop   Apply 1 drop to eye every 3 hours for 7 days   Quantity:  1 Bottle   Refills:  0            Where to get your medicines      These medications were sent to [facility] [address]  [address] [facility] [address]     Phone:  [phone]     trimethoprim-polymyxin b ophthalmic solution                Primary Care Provider Office Phone # Fax #    Alphonso Ceasar Grover [phone]       [address] [facility] [address]  [facility] [address]        Equal Access to Services     [facility] Center[facility] AH: Hadii dawn sánchez hadasho Sochauncey, waaxda luqadaha, qaybta kaalmada adeegyada, waxay lito kelly . So [facility] [phone].    ATENCIÓN: Si habla español, tiene a lo disposición servicios gratuitos de asistencia lingüística. Llame al [phone].    We comply with applicable federal civil rights laws and [place] laws. We do not discriminate on the basis of race, color, national origin, age, disability, sex, sexual orientation, or gender identity.            Thank you!     Thank you for choosing [facility]  DALIA  for your care. Our goal is always to provide you with excellent care. Hearing back from our patients is one way we can continue to improve our services. Please take a few minutes to complete the written survey that you may receive in the mail after your visit with us. Thank you!             Your Updated Medication List - Protect others around you: Learn how to safely use, store and throw away your medicines at www.disposemymeds.org.          This list is accurate as of 5/23/18 10:58 AM.  Always use your most recent med list.                   Brand Name Dispense Instructions for use Diagnosis    amLODIPine 5 MG tablet    NORVASC    180 tablet    Take 2 tablets (10 mg) by mouth daily    Hypertension goal BP (blood pressure) < 140/90       CENTRUM SILVER ULTRA MENS PO           cholecalciferol 1000 UNIT tablet    vitamin D3     Take by mouth daily        clotrimazole 1 % cream    LOTRIMIN    30 g    Apply topically 2 times daily    Tinea corporis       Desloratadine-Pseudoephedrine 5-240 MG Tb24    CLARINEX-D 24 HOUR    30 tablet    Take 1 tablet by mouth daily as needed    Acute seasonal allergic rhinitis due to pollen       hydrocortisone 0.2 % cream    WESTCORT    60 g    Apply topically 2 times daily    Intertrigo       hydrocortisone 2.5 % ointment     30 g    Apply topically 2 times daily        LANsoprazole 30 MG CR capsule    PREVACID    90 capsule    Take 1 capsule (30 mg) by mouth daily    Gastroesophageal reflux disease without esophagitis       metoprolol succinate 100 MG 24 hr tablet    TOPROL-XL    90 tablet    Take 1 tablet (100 mg) by mouth daily    Hypertension goal BP (blood pressure) < 140/90       nystatin cream    MYCOSTATIN    60 g    Apply topically 3 times daily    Intertrigo       ranitidine 300 MG tablet    ZANTAC    90 tablet    Take 1 tablet (300 mg) by mouth At Bedtime    Gastroesophageal reflux disease without esophagitis       * sildenafil 100 MG tablet    VIAGRA    6 tablet     Take 0.5-1 tablets ( mg) by mouth daily as needed for erectile dysfunction Take 30 min to 4 hours before intercourse.  Never use with nitroglycerin, terazosin or doxazosin.    ED (erectile dysfunction)       * sildenafil 100 MG tablet    VIAGRA    6 tablet    Take 0.5-1 tablets ( mg) by mouth daily as needed for erectile dysfunction Take 30 min to 4 hours before intercourse.  Never use with nitroglycerin, terazosin or doxazosin.    Erectile dysfunction due to arterial insufficiency       trimethoprim-polymyxin b ophthalmic solution    POLYTRIM    1 Bottle    Apply 1 drop to eye every 3 hours for 7 days    Acute conjunctivitis of left eye, unspecified acute conjunctivitis type       * Notice:  This list has 2 medication(s) that are the same as other medications prescribed for you. Read the directions carefully, and ask your doctor or other care provider to review them with you.

## 2018-05-23 NOTE — TELEPHONE ENCOUNTER
"PATIENT COMPLAINS OF :  --Allergies starting Sunday and then left eye got red and swollen.  --Yesterday was better but today the white part of left eye is red, watery,  and lid are moderately swollen.  --\"Feels like sandpaper in eye\" like when has allergies.    DENIES:  --Right eye is normal.  --No pain in left or right eye even with movement.  --No vision change.  --Does not wear contacts.  --Not itchy.  --No fever.    Protocol consulted - Eye problem.  Samina Killian Telephone Triage Protocols For Nurses 5th Edition guideline.    Lorena Munguia RN          "

## 2018-05-23 NOTE — PATIENT INSTRUCTIONS

## 2018-05-23 NOTE — TELEPHONE ENCOUNTER
"[5/23/2018 8:13 AM] Yvonne Wu:   Danyel Carrillo, could you triage my patient this morning Juan Jose Razo for \"eye infection\". If red flag symptoms or sounds severe would recommend going straight to eye doctor (eye pain, vision change, fever, etc) or ER depending on severity.     "
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
## 2018-07-02 DIAGNOSIS — I10 HYPERTENSION GOAL BP (BLOOD PRESSURE) < 140/90: ICD-10-CM

## 2018-07-02 NOTE — TELEPHONE ENCOUNTER
"Requested Prescriptions   Pending Prescriptions Disp Refills     amLODIPine (NORVASC) 5 MG tablet [Pharmacy Med Name: AMLODIPINE BESYLATE 5MG TABS] 180 tablet 3    Last Written Prescription Date:  06/26/2017  Last Fill Quantity: 180,  # refills: 3   Last office visit: 5/23/2018 with prescribing provider:  06/26/2017   Future Office Visit:     Sig: TAKE TWO TABLETS BY MOUTH EVERY DAY    Calcium Channel Blockers Protocol  Failed    7/2/2018  7:44 AM       Failed - Normal serum creatinine on file in past 12 months    Recent Labs   Lab Test  06/26/17   1431   CR  1.15            Passed - Blood pressure under 140/90 in past 12 months    BP Readings from Last 3 Encounters:   05/23/18 134/85   12/12/17 126/80   10/24/17 133/80                Passed - Recent (12 mo) or future (30 days) visit within the authorizing provider's specialty    Patient had office visit in the last 12 months or has a visit in the next 30 days with authorizing provider or within the authorizing provider's specialty.  See \"Patient Info\" tab in inbasket, or \"Choose Columns\" in Meds & Orders section of the refill encounter.           Passed - Patient is age 18 or older        metoprolol succinate (TOPROL-XL) 100 MG 24 hr tablet [Pharmacy Med Name: METOPROLOL SUCCINATE ER 100MG TB24] 90 tablet 3    Last Written Prescription Date:  06/26/2017  Last Fill Quantity: 90,  # refills: 3   Last office visit: 5/23/2018 with prescribing provider:  06/26/2017   Future Office Visit:     Sig: TAKE ONE TABLET BY MOUTH EVERY DAY    Beta-Blockers Protocol Passed    7/2/2018  7:44 AM       Passed - Blood pressure under 140/90 in past 12 months    BP Readings from Last 3 Encounters:   05/23/18 134/85   12/12/17 126/80   10/24/17 133/80                Passed - Patient is age 6 or older       Passed - Recent (12 mo) or future (30 days) visit within the authorizing provider's specialty    Patient had office visit in the last 12 months or has a visit in the next 30 days with " "authorizing provider or within the authorizing provider's specialty.  See \"Patient Info\" tab in inbasket, or \"Choose Columns\" in Meds & Orders section of the refill encounter.              "

## 2018-07-03 RX ORDER — AMLODIPINE BESYLATE 5 MG/1
TABLET ORAL
Qty: 180 TABLET | Refills: 3 | Status: SHIPPED | OUTPATIENT
Start: 2018-07-03 | End: 2018-10-09

## 2018-07-03 RX ORDER — METOPROLOL SUCCINATE 100 MG/1
TABLET, EXTENDED RELEASE ORAL
Qty: 90 TABLET | Refills: 0 | Status: SHIPPED | OUTPATIENT
Start: 2018-07-03 | End: 2018-10-03

## 2018-07-03 NOTE — TELEPHONE ENCOUNTER
Dr. Grover--    Please review/sign or advise on Amlodipine 5 mg tablets. Last creatinine level June 2017.     Metoprolol 100 mg   Prescription approved per [facility] Refill Protocol.      Lisa Hernandez RN  [facility]

## 2018-08-10 DIAGNOSIS — L30.4 INTERTRIGO: Primary | ICD-10-CM

## 2018-08-13 RX ORDER — HYDROCORTISONE 25 MG/G
OINTMENT TOPICAL
Qty: 30 G | Refills: 6 | Status: SHIPPED | OUTPATIENT
Start: 2018-08-13 | End: 2018-12-18

## 2018-08-13 NOTE — TELEPHONE ENCOUNTER
hydrocortisone 2.5 % ointment      Last Written Prescription Date:  3/12/18  Last Fill Quantity: 30 g,   # refills: 1  Last Office Visit : 3/12/18  Future Office visit:  Recall list    Routing refill request to provider for review/approval because:  Drug not on the Derm refill protocol-- OTC?  (Not ordered as Desonide/Desowen Hydrocortisone cream.)    Plan 3/12/18 visit:   Intertrigo, groin  - Patient stopped hydrocortisone 0.2% cream and nystatin cream as this is not covered with insurance.  - Start hydrocortisone 2.5% ointment - apply topically 2 times daily.    RTC 1 year

## 2018-08-14 NOTE — TELEPHONE ENCOUNTER
Last note reviewed and refill is appropriate. Refill request approved and sent to pharmacy.    Rosa Jones MD  Dermatology Resident, PGY-3  [facility]

## 2018-09-12 ENCOUNTER — OFFICE VISIT (OUTPATIENT)
Dept: DERMATOLOGY | Facility: CLINIC | Age: 68
End: 2018-09-12
Payer: COMMERCIAL

## 2018-09-12 DIAGNOSIS — L82.1 SEBORRHEIC KERATOSIS: ICD-10-CM

## 2018-09-12 DIAGNOSIS — D48.5 NEOPLASM OF UNCERTAIN BEHAVIOR OF SKIN: ICD-10-CM

## 2018-09-12 DIAGNOSIS — D48.5 NEOPLASM OF UNCERTAIN BEHAVIOR OF SCALP: Primary | ICD-10-CM

## 2018-09-12 DIAGNOSIS — L82.0 INFLAMED SEBORRHEIC KERATOSIS: ICD-10-CM

## 2018-09-12 ASSESSMENT — PAIN SCALES - GENERAL: PAINLEVEL: NO PAIN (0)

## 2018-09-12 NOTE — NURSING NOTE
Dermatology Rooming Note    Juan Jose Razo's goals for this visit include:   Chief Complaint   Patient presents with     Derm Problem     Juan Jose notes several lesions of cocnern on his scalp .     Iza Mobley LPN
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
## 2018-09-12 NOTE — PROGRESS NOTES
[facility] Dermatology Note    Dermatology Problem List:  1. 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 Hx of dysplastic nevus, pt reported  3. Onychomycosis  4. AKs  5. 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 Intertrigo in groin  - s/p hydrocortisone 0.2% cream, nystatin cream  - hydrocortisone 2.5% ointment  7. Verrucous vulgaris, right lateral neck s/p biopsy 3/12/18  8. NUB left parietal scalp and right nasal ala 9/12/18  9. Skin cancer screening 3/12/18    CC:   Chief Complaint   Patient presents with     Derm Problem     Juan Jose notes several lesions of cocnern on his scalp .     Date of Service: Sep 12, 2018    History of Present Illness:  Mr. Juan Jose Razo is a 67 year old male who presents for a concerns of a growing lesion on his left parietal scalp. He states over the last month it has grown rapidly in size and is concerned. He also has some growths on his face and his left upper arm that are bothersome. He finds himself picking at the areas and at times scratches them. His granddaughter picks at the growth on his left arm.     He was last seen here 3/1/2/18 for a skin cancer screening and took a biopsy on the right lateral neck. This returned as a verruca vulgaris. He believes this has not returned.       Overall he is feeling well. The patient reports no other lesions of concern at this time.    Otherwise, the patient reports no painful, bleeding, nonhealing, or pruritic lesions, and denies new or changing moles.    Past Medical History:   Patient Active Problem List   Diagnosis     CARDIOVASCULAR SCREENING; LDL GOAL LESS THAN 130     Hypertension goal BP (blood pressure) < 140/90     GERD (gastroesophageal reflux disease)     Seasonal allergic rhinitis     Advanced directives, counseling/discussion     Phlebitis and thrombophlebitis of femoral vein (deep) (superficial), left     Skin exam, screening for cancer     Multiple benign nevi     Seborrheic  keratoses     Skin cancer screening     Morbid obesity (H)     Past Medical History:   Diagnosis Date     Acid reflux      Allergies      HTN (hypertension) 1990     Squamous cell carcinoma      Varicose veins      Past Surgical History:   Procedure Laterality Date     COLONOSCOPY  2001, 2006, 2010    polyp     keratinous cyst  12/09     LIGATE VEIN VARICOSE, PHLEBECTOMY MULTIPLE STAB, COMBINED  1995     MOHS MICROGRAPHIC PROCEDURE       NO HISTORY OF SURGERY  10/24/13    derm     Social History:  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     Family History:  Not addressed at this visit.    Medications:  Current Outpatient Prescriptions   Medication Sig Dispense Refill     amLODIPine (NORVASC) 5 MG tablet TAKE TWO TABLETS BY MOUTH EVERY  tablet 3     cholecalciferol (VITAMIN D3) 1000 UNIT tablet Take by mouth daily       clotrimazole (LOTRIMIN) 1 % cream Apply topically 2 times daily 30 g 1     hydrocortisone (WESTCORT) 0.2 % cream Apply topically 2 times daily 60 g 1     LANsoprazole (PREVACID) 30 MG CR capsule TAKE ONE CAPSULE BY MOUTH EVERY DAY 90 capsule 3     metoprolol succinate (TOPROL-XL) 100 MG 24 hr tablet TAKE ONE TABLET BY MOUTH EVERY DAY 90 tablet 0     Multiple Vitamins-Minerals (CENTRUM SILVER ULTRA MENS PO)        nystatin (MYCOSTATIN) cream Apply topically 3 times daily 60 g 1     ranitidine (ZANTAC) 300 MG tablet TAKE ONE TABLET BY MOUTH EVERY NIGHT AT BEDTIME 90 tablet 3     Desloratadine-Pseudoephedrine (CLARINEX-D 24 HOUR) 5-240 MG TB24 Take 1 tablet by mouth daily as needed (Patient not taking: Reported on 9/12/2018) 30 tablet 2     hydrocortisone 2.5 % ointment Apply topically 2 times daily as directed (Patient not taking: Reported on 9/12/2018) 30 g 6     sildenafil (VIAGRA) 100 MG cap/tab Take 0.5-1 tablets ( mg) by mouth daily as needed for erectile dysfunction Take 30 min to 4 hours before intercourse.  Never use with nitroglycerin,  terazosin or doxazosin. (Patient not taking: Reported on 9/12/2018) 6 tablet 1     sildenafil (VIAGRA) 100 MG tablet Take 0.5-1 tablets ( mg) by mouth daily as needed for erectile dysfunction Take 30 min to 4 hours before intercourse.  Never use with nitroglycerin, terazosin or doxazosin. (Patient not taking: Reported on 9/12/2018) 6 tablet 3     Allergies:  No Known Allergies     Review of Systems:  - Skin: As above in HPI. No additional skin concerns.    Physical exam:  Vitals: There were no vitals taken for this visit.  GEN: This is a well developed, well-nourished male in no acute distress, in a pleasant mood.      SKIN: Sun-exposed skin, which includes the head/face, neck, both arms, digits, and/or nails was examined.     - Stuck on tan to brown papules on the scalp, extremities  -There are tan stuck on papules on the left upper arm x 1 and on the left cheek on an erythematous base x 3  -There is a 2.5 cm verrucous pink scaly plaque on the left parietal scalp  - There is a 5 mm pink pearly excoriated papule on the right nasal ala    - No other lesions of concern on areas examined.     Impression/Plan:  1. Hx of SCC on left temple.  - No evidence of recurrence on left temple. Continue regular skin checks.    2. Inflamed seborrheic keratoses - left upper arm, left cheek x 3   -Because these are bothersome to the patient, will treat with cryotherapy.   Cryotherapy procedure note (performed by faculty): After verbal consent and discussion of risks and benefits including but no limited to dyspigmentation/scar, blister, infection, recurrence,four were treated with 1-2mm freeze border for 2 cycles with liquid nitrogen. Post cryotherapy instructions were provided.     3. Seborrheic keratoses -scalp, face, neck  - No further intervention required. Patient to report changes.       4. NUB - right nasal ala (r/o BCC) and left parietal scalp  Differential diagnosis includes verrucous keratosis vs SCC vs other   -  Shave biopsy:  After discussion of benefits and risks including but not limited to bleeding/bruising, pain/swelling, infection, scar, incomplete removal, nerve damage/numbness, recurrence, and non-diagnostic biopsy, written consent, verbal consent and photographs were obtained. Time-out was performed. The area was cleaned with isopropyl alcohol and was injected to obtain adequate anesthesia of the lesion on the right nasal ala and left parietal scalp.  1 ml of 1% lidocaine with epinephrine was injected to obtain adequate anesthesia. A shave biopsy was performed. Hemostasis was achieved with aluminium chloride. Vaseline and a sterile dressing were applied. The patient tolerated the procedure and no complications were noted. The patient was provided with verbal and written post care instructions.          Follow-up in 6 months for the full skin exam (March), earlier for new or changing lesions.    Staff Involved:  Staff Only    All risks, benefits and alternatives were discussed with patient.  Patient is in agreement and understands the assessment and plan.  All questions were answered.  Sun Screen Education was given.   Return to Clinic in 6 months or sooner as needed.   Imelda Rincon PA-C   [facility]

## 2018-09-12 NOTE — MR AVS SNAPSHOT
After Visit Summary   9/12/2018    Juan Jose Razo    MRN: [id number]           Patient Information     Date Of Birth          1950        Visit Information        Provider Department      9/12/2018 6:00 PM Imelda Rincon PA-C [facility] Dermatology        Today's Diagnoses     Neoplasm of uncertain behavior of scalp    -  1    Neoplasm of uncertain behavior of skin        Inflamed seborrheic keratosis        Seborrheic keratosis          Care Instructions    Wound Care After a Biopsy    What is a skin biopsy?  A skin biopsy allows the doctor to examine a very small piece of tissue under the microscope to determine the diagnosis and the best treatment for the skin condition. A local anesthetic (numbing medicine)  is injected with a very small needle into the skin area to be tested. A small piece of skin is taken from the area. Sometimes a suture (stitch) is used.     What are the risks of a skin biopsy?  I will experience scar, bleeding, swelling, pain, crusting and redness. I may experience incomplete removal or recurrence. Risks of this procedure are excessive bleeding, bruising, infection, nerve damage, numbness, thick (hypertrophic or keloidal) scar and non-diagnostic biopsy.    How should I care for my wound for the first 24 hours?    Keep the wound dry and covered for 24 hours    If it bleeds, hold direct pressure on the area for 15 minutes. If bleeding does not stop then go to the emergency room    Avoid strenuous exercise the first 1-2 days or as your doctor instructs you    How should I care for the wound after 24 hours?    After 24 hours, remove the bandage    You may bathe or shower as normal    If you had a scalp biopsy, you can shampoo as usual and can use shower water to clean the biopsy site daily    Clean the wound twice a day with gentle soap and water    Do not scrub, be gentle    Apply white petroleum/Vaseline after cleaning the wound with a cotton swab or a clean  finger, and keep the site covered with a Bandaid /bandage. Bandages are not necessary with a scalp biopsy    If you are unable to cover the site with a Bandaid /bandage, re-apply ointment 2-3 times a day to keep the site moist. Moisture will help with healing    Avoid strenuous activity for first 1-2 days    Avoid lakes, rivers, pools, and oceans until the stitches are removed or the site is healed    How do I clean my wound?    Wash hands thoroughly with soap or use hand  before all wound care    Clean the wound with gentle soap and water    Apply white petroleum/Vaseline  to wound after it is clean    Replace the Bandaid /bandage to keep the wound covered for the first few days or as instructed by your doctor    If you had a scalp biopsy, warm shower water to the area on a daily basis should suffice    What should I use to clean my wound?     Cotton-tipped applicators (Qtips )    White petroleum jelly (Vaseline ). Use a clean new container and use Q-tips to apply.    Bandaids   as needed    Gentle soap     How should I care for my wound long term?    Do not get your wound dirty    Keep up with wound care for one week or until the area is healed.    A small scab will form and fall off by itself when the area is completely healed. The area will be red and will become pink in color as it heals. Sun protection is very important for how your scar will turn out. Sunscreen with an SPF 30 or greater is recommended once the area is healed.    If you have stitches, stitches need to be removed in  days. You may return to our clinic for this or you may have it done locally at your doctor s office.    You should have some soreness but it should be mild and slowly go away over several days. Talk to your doctor about using tylenol for pain,    When should I call my doctor?  If you have increased:     Pain or swelling    Pus or drainage (clear or slightly yellow drainage is ok)    Temperature over 100F    Spreading  redness or warmth around wound    When will I hear about my results?  The biopsy results can take 2-3 weeks to come back. The clinic will call you with the results, send you a [employer] message, or have you schedule a follow-up clinic or phone time to discuss the results. Contact our clinics if you do not hear from us in 3 weeks.     Who should I call with questions?    [facility]: [phone]     [facility]: [phone]    For urgent needs outside of business hours call the [facility] at [phone] and ask for the dermatology resident on call              Follow-ups after your visit        Follow-up notes from your care team     Return in about 6 months (around 3/12/2019).      Who to contact     Please call your clinic at [phone] to:    Ask questions about your health    Make or cancel appointments    Discuss your medicines    Learn about your test results    Speak to your doctor            Additional Information About Your Visit        [email or web address] Information     [email or web address] gives you secure access to your electronic health record. If you see a primary care provider, you can also send messages to your care team and make appointments. If you have questions, please call your primary care clinic.  If you do not have a primary care provider, please call [phone] and they will assist you.      [email or web address] is an electronic gateway that provides easy, online access to your medical records. With [email or web address], you can request a clinic appointment, read your test results, renew a prescription or communicate with your care team.     To access your existing account, please contact your [facility] or call [phone] for assistance.        Care EveryWhere ID     This is your Care EveryWhere ID. This could be used by other organizations to access your [facility] medical records  [id number]         Blood Pressure from Last 3  Encounters:   05/23/18 134/85   12/12/17 126/80   10/24/17 133/80    Weight from Last 3 Encounters:   05/23/18 129.5 kg (285 lb 8 oz)   12/12/17 130.6 kg (288 lb)   10/24/17 127.5 kg (281 lb)              We Performed the Following     BIOPSY SKIN/SUBQ/MUC MEM, EACH ADDTL LESION     BIOPSY SKIN/SUBQ/MUC MEM, SINGLE LESION     Dermatological path order and indications     DESTRUCT BENIGN LESION, UP TO 14        Primary Care Provider Office Phone # Fax #    Alphonso Ceasar Grover [phone]       [address] [facility] [address]  [facility] [address]        Equal Access to Services     YUNIOR FRASER : Hadii dawn galeanoo Pelon, waaxda luqadaha, qaybta kaalmada adetierayada, jumana ruiz. So [facility] [phone].    ATENCIÓN: Si habla español, tiene a lo disposición servicios gratuitos de asistencia lingüística. Llame al [phone].    We comply with applicable federal civil rights laws and [place] laws. We do not discriminate on the basis of race, color, national origin, age, disability, sex, sexual orientation, or gender identity.            Thank you!     Thank you for choosing [facility]ER[facility]  for your care. Our goal is always to provide you with excellent care. Hearing back from our patients is one way we can continue to improve our services. Please take a few minutes to complete the written survey that you may receive in the mail after your visit with us. Thank you!             Your Updated Medication List - Protect others around you: Learn how to safely use, store and throw away your medicines at www.disposemymeds.org.          This list is accurate as of 9/12/18  9:02 PM.  Always use your most recent med list.                   Brand Name Dispense Instructions for use Diagnosis    amLODIPine 5 MG tablet    NORVASC    180 tablet    TAKE TWO TABLETS BY MOUTH EVERY DAY    Hypertension goal BP (blood pressure) < 140/90       CENTRUM SILVER ULTRA MENS PO            cholecalciferol 1000 UNIT tablet    vitamin D3     Take by mouth daily        clotrimazole 1 % cream    LOTRIMIN    30 g    Apply topically 2 times daily    Tinea corporis       Desloratadine-Pseudoephedrine 5-240 MG Tb24    CLARINEX-D 24 HOUR    30 tablet    Take 1 tablet by mouth daily as needed    Acute seasonal allergic rhinitis due to pollen       hydrocortisone 0.2 % cream    WESTCORT    60 g    Apply topically 2 times daily    Intertrigo       hydrocortisone 2.5 % ointment     30 g    Apply topically 2 times daily as directed    Intertrigo       LANsoprazole 30 MG CR capsule    PREVACID    90 capsule    TAKE ONE CAPSULE BY MOUTH EVERY DAY    Gastroesophageal reflux disease without esophagitis       metoprolol succinate 100 MG 24 hr tablet    TOPROL-XL    90 tablet    TAKE ONE TABLET BY MOUTH EVERY DAY    Hypertension goal BP (blood pressure) < 140/90       nystatin cream    MYCOSTATIN    60 g    Apply topically 3 times daily    Intertrigo       ranitidine 300 MG tablet    ZANTAC    90 tablet    TAKE ONE TABLET BY MOUTH EVERY NIGHT AT BEDTIME    Gastroesophageal reflux disease without esophagitis       * sildenafil 100 MG tablet    VIAGRA    6 tablet    Take 0.5-1 tablets ( mg) by mouth daily as needed for erectile dysfunction Take 30 min to 4 hours before intercourse.  Never use with nitroglycerin, terazosin or doxazosin.    ED (erectile dysfunction)       * sildenafil 100 MG tablet    VIAGRA    6 tablet    Take 0.5-1 tablets ( mg) by mouth daily as needed for erectile dysfunction Take 30 min to 4 hours before intercourse.  Never use with nitroglycerin, terazosin or doxazosin.    Erectile dysfunction due to arterial insufficiency       * Notice:  This list has 2 medication(s) that are the same as other medications prescribed for you. Read the directions carefully, and ask your doctor or other care provider to review them with you.

## 2018-09-13 NOTE — PATIENT INSTRUCTIONS

## 2018-09-17 LAB — COPATH REPORT: NORMAL

## 2018-09-20 ENCOUNTER — TELEPHONE (OUTPATIENT)
Dept: DERMATOLOGY | Facility: CLINIC | Age: 68
End: 2018-09-20

## 2018-09-20 ENCOUNTER — OFFICE VISIT (OUTPATIENT)
Dept: DERMATOLOGY | Facility: CLINIC | Age: 68
End: 2018-09-20
Payer: COMMERCIAL

## 2018-09-20 DIAGNOSIS — C44.321: Primary | ICD-10-CM

## 2018-09-20 ASSESSMENT — PAIN SCALES - GENERAL: PAINLEVEL: NO PAIN (0)

## 2018-09-20 NOTE — TELEPHONE ENCOUNTER
History of Skin cancer : Yes    History of Mohs Surgery: no    History of a solid organ transplant: no Organ(s):  Year(s):  Creatinine:  Bone Marrow Transplant : no (year, )    Immunosuppressive Medications: no (if yes, which ones: )    HIV or Hepatitis B or C : no    Chronic lymphocytic leukemia: no    Diabetes: no (Type 1 or 2: )    Any Bleeding disorders: no    Do you have a Pacemaker or Defibrillator: no (year placed: )    Artificial heart valve: no (mechanical or porcine: )    Joint Replacement in the last 2 years: no Joint(s):  Year(s):     Do you typically take Prophylactic Antibiotics before seeing a dentist or having a procedure?: no    If yes, verify that patient has the antibiotic on hand and instruct to take one hour before their surgery appointment.    If they do not have the antibiotic, verify the patients pharmacy and notify surgeon by printing the pre-mohs call sheet.    Do you wear a C Pap Mask: Yes    If yes, and procedure is on the face, ask patient to bring in the mask with them (Mask only)    Do you have any mobility issues: no    If yes, is a van service is required to transport you to/from your appointment? no    Smoking History (tobacco of any sort): no    Do you have any other health issues that we should know about? High blood pressure and Acid reflux    Do you take any of the following Blood Thinners:    Aspirin: no    Plavix/Aggrastat/Brilinta: no    Warfarin: no (Last INR:  Date: )    Pradaxa/Eliquis/Xarelto: no    Ibuprofen (Advil/Motrin): as needed    Naproxen (Aleve): no    Vitamin E: no    Fish Oil: no    Ginkgo biloba: no    Other:    Done-Paient was instructed of the following: Ibuprofen, naproxen, Vitamin E, Fish Oil, and Ginkgo biloba should be stopped 1 week prior to and 1 week after the procedure.    Medication Allergies: in EHR    Are medications in Epic: in EHR    Done-Patient was reminded to take all medications as usual, other than those listed above, on the day of  surgery    Done-Patient was instructed to bring all medications to clinic on day of surgery    Done-Patient will bring a     (A  is helpful for the following reasons: some patients need medications to relax, but once given, patients should not drive; sometimes bandages obstruct your vision making it difficult to see and unsafe to drive; the day can get long so it s nice to have a sandi; sometimes the procedure wears people out/makes them quite tired)    Done-Photograph of the surgical site(s) is/are available    Done-Patient was reminded that this can be an all-day procedure and that no other appointments should be scheduled on the day of Mohs

## 2018-09-20 NOTE — NURSING NOTE
Chief Complaint   Patient presents with     Skin Cancer     Mr. Razo is here today for a consult for MOHS on the right nasal ala for an SCC.      Candie Gutierrez, CMA
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
## 2018-09-20 NOTE — PATIENT INSTRUCTIONS
Mohs Cutaneous Micrographic Surgery Unit  Hans Barba MD      Pre-Surgical Instruction Sheet    1. Expect to be here majority of the morning.  The Mohs surgical procedure can be an extensive surgery requiring multiple stages. Each stage may take 1-2 hours.    ? You may eat breakfast  ? You may bring snacks or beverages with you  ? Take all normal medications morning of surgery -- unless otherwise indicated by your physician  ? If you have an artificial heart valve, or joint replacement within two years, we will prescribe an antibiotic. Please take one hour prior to surgery.    2. You will need a  to be with you if your surgical site is close to your eyes. You can get swelling/bruising immediately after surgery and will go home with a big bulky bandage that could obstruct your view of driving safely.    3. Wear comfortable clothing -- preferably a button down shirt or a loose fitted shirt. (to avoid pulling over pressure bandage off when you get home) If your surgery site is on your leg, try wearing loose fitted pants. If your surgery site is on your arm, try wearing a short-sleeve shirt.    4. Bring reading material or other items to help pass time. We do have internet access available if you own a laptop, iPad, etc.)    5. Women - do NOT wear make-up. We will be washing it off anyone needing surgery on the face    6. Do NOT stop blood thinning medication unless instructed to do so by your surgeon. This will include: Warfarin, Coumadin, Jantoven, Aspirin, Plavix and Pradaxa. If you are taking Warfarin or Coumadin, please have your INR blood test results sent to our office no more than 7 days prior to your surgery.     7. Tylenol is a good alternative to take for headaches and pain, and can be taken throughout your surgery and postoperative recovery.    8. If your surgery is on your trunk, arms, hands, legs, feet, fingernail or a toenail, please wash the area with Hibiclens, an over-the-counter antiseptic soap,  the night before and morning of your surgery.     If you have any questions, please feel free to contact us.    Phone numbers:  During business hours (M-F 8:00-4:30 p.m.)  [facility]:  [phone] - select option 1 for appt. desk  [phone] - select option 3 for nurse triage line  [facility]:   [phone] - goes straight to call center   ---------------------------------------------------------  Evenings/Weekends/Holidays  Hospital - [phone]   TTY for hearing ucmxpnak-[phone]  *Ask  to page dermatologist on-call  Emergency Xncl-[phone]  TTY for hearing impaired- [phone]

## 2018-09-20 NOTE — MR AVS SNAPSHOT
After Visit Summary   9/20/2018    Juan Jose Razo    MRN: [id number]           Patient Information     Date Of Birth          1950        Visit Information        Provider Department      9/20/2018 8:30 AM Hans Barba MD [facility] Dermatologic Surgery        Care Instructions    Mohs Cutaneous Micrographic Surgery Unit  Hans Barba MD      Pre-Surgical Instruction Sheet    1. Expect to be here majority of the morning.  The Mohs surgical procedure can be an extensive surgery requiring multiple stages. Each stage may take 1-2 hours.    ? You may eat breakfast  ? You may bring snacks or beverages with you  ? Take all normal medications morning of surgery -- unless otherwise indicated by your physician  ? If you have an artificial heart valve, or joint replacement within two years, we will prescribe an antibiotic. Please take one hour prior to surgery.    2. You will need a  to be with you if your surgical site is close to your eyes. You can get swelling/bruising immediately after surgery and will go home with a big bulky bandage that could obstruct your view of driving safely.    3. Wear comfortable clothing -- preferably a button down shirt or a loose fitted shirt. (to avoid pulling over pressure bandage off when you get home) If your surgery site is on your leg, try wearing loose fitted pants. If your surgery site is on your arm, try wearing a short-sleeve shirt.    4. Bring reading material or other items to help pass time. We do have internet access available if you own a laptop, iPad, etc.)    5. Women - do NOT wear make-up. We will be washing it off anyone needing surgery on the face    6. Do NOT stop blood thinning medication unless instructed to do so by your surgeon. This will include: Warfarin, Coumadin, Jantoven, Aspirin, Plavix and Pradaxa. If you are taking Warfarin or Coumadin, please have your INR blood test results sent to our office no more than 7 days prior to your surgery.      7. Tylenol is a good alternative to take for headaches and pain, and can be taken throughout your surgery and postoperative recovery.    8. If your surgery is on your trunk, arms, hands, legs, feet, fingernail or a toenail, please wash the area with Hibiclens, an over-the-counter antiseptic soap, the night before and morning of your surgery.     If you have any questions, please feel free to contact us.    Phone numbers:  During business hours (M-F 8:00-4:30 p.m.)  [facility]:  [phone] - select option 1 for appt. desk  [phone] - select option 3 for nurse triage line  [facility]:   [phone] - goes straight to call center   ---------------------------------------------------------  Evenings/Weekends/Holidays  Hospital - [phone]   TTY for hearing vckxosgf-[phone]  *Ask  to page dermatologist on-call  Emergency Ksjt-[phone]  TTY for hearing impaired- [phone]                Follow-ups after your visit        Your next 10 appointments already scheduled     Sep 24, 2018  8:30 AM CDT   (Arrive by 8:15 AM)   Return Mohs with Hans Barba MD   [facility] Dermatologic Surgery ([facility])    [address] [place] [address] [phone]              Who to contact     Please call your clinic at [phone] to:    Ask questions about your health    Make or cancel appointments    Discuss your medicines    Learn about your test results    Speak to your doctor            Additional Information About Your Visit        [employer] Information     [employer] gives you secure access to your electronic health record. If you see a primary care provider, you can also send messages to your care team and make appointments. If you have questions, please call your primary care clinic.  If you do not have a primary care provider, please call [phone] and they will assist you.       [employer] is an electronic gateway that provides easy, online access to your medical records. With [employer], you can request a clinic appointment, read your test results, renew a prescription or communicate with your care team.     To access your existing account, please contact your [facility] or call [phone] for assistance.        Care EveryWhere ID     This is your Care EveryWhere ID. This could be used by other organizations to access your [facility] medical records  [id number]         Blood Pressure from Last 3 Encounters:   05/23/18 134/85   12/12/17 126/80   10/24/17 133/80    Weight from Last 3 Encounters:   05/23/18 129.5 kg (285 lb 8 oz)   12/12/17 130.6 kg (288 lb)   10/24/17 127.5 kg (281 lb)              Today, you had the following     No orders found for display       Primary Care Provider Office Phone # Fax #    Alphonso Ceasar Grover [phone]       [address] [facility] [address]  [facility] [address]        Equal Access to Services     [facility]: Hadii aad ku hadasho Soomaali, waaxda luqadaha, qaybta kaalmada adeegyada, waxjackelin morse hayjodie kelly . So [facility] [phone].    ATENCIÓN: Si habla español, tiene a lo disposición servicios gratuitos de asistencia lingüística. Llame al [phone].    We comply with applicable federal civil rights laws and [place] laws. We do not discriminate on the basis of race, color, national origin, age, disability, sex, sexual orientation, or gender identity.            Thank you!     Thank you for choosing [facility] DERMATOLOGIC SURGERY  for your care. Our goal is always to provide you with excellent care. Hearing back from our patients is one way we can continue to improve our services. Please take a few minutes to complete the written survey that you may receive in the mail after your visit with us. Thank you!             Your Updated Medication List - Protect others around you: Learn how to safely  use, store and throw away your medicines at www.disposemymeds.org.          This list is accurate as of 9/20/18  8:45 AM.  Always use your most recent med list.                   Brand Name Dispense Instructions for use Diagnosis    amLODIPine 5 MG tablet    NORVASC    180 tablet    TAKE TWO TABLETS BY MOUTH EVERY DAY    Hypertension goal BP (blood pressure) < 140/90       CENTRUM SILVER ULTRA MENS PO           cholecalciferol 1000 UNIT tablet    vitamin D3     Take by mouth daily        clotrimazole 1 % cream    LOTRIMIN    30 g    Apply topically 2 times daily    Tinea corporis       Desloratadine-Pseudoephedrine 5-240 MG Tb24    CLARINEX-D 24 HOUR    30 tablet    Take 1 tablet by mouth daily as needed    Acute seasonal allergic rhinitis due to pollen       hydrocortisone 0.2 % cream    WESTCORT    60 g    Apply topically 2 times daily    Intertrigo       hydrocortisone 2.5 % ointment     30 g    Apply topically 2 times daily as directed    Intertrigo       LANsoprazole 30 MG CR capsule    PREVACID    90 capsule    TAKE ONE CAPSULE BY MOUTH EVERY DAY    Gastroesophageal reflux disease without esophagitis       metoprolol succinate 100 MG 24 hr tablet    TOPROL-XL    90 tablet    TAKE ONE TABLET BY MOUTH EVERY DAY    Hypertension goal BP (blood pressure) < 140/90       nystatin cream    MYCOSTATIN    60 g    Apply topically 3 times daily    Intertrigo       ranitidine 300 MG tablet    ZANTAC    90 tablet    TAKE ONE TABLET BY MOUTH EVERY NIGHT AT BEDTIME    Gastroesophageal reflux disease without esophagitis       * sildenafil 100 MG tablet    VIAGRA    6 tablet    Take 0.5-1 tablets ( mg) by mouth daily as needed for erectile dysfunction Take 30 min to 4 hours before intercourse.  Never use with nitroglycerin, terazosin or doxazosin.    ED (erectile dysfunction)       * sildenafil 100 MG tablet    VIAGRA    6 tablet    Take 0.5-1 tablets ( mg) by mouth daily as needed for erectile dysfunction Take 30  min to 4 hours before intercourse.  Never use with nitroglycerin, terazosin or doxazosin.    Erectile dysfunction due to arterial insufficiency       * Notice:  This list has 2 medication(s) that are the same as other medications prescribed for you. Read the directions carefully, and ask your doctor or other care provider to review them with you.

## 2018-09-24 ENCOUNTER — OFFICE VISIT (OUTPATIENT)
Dept: DERMATOLOGY | Facility: CLINIC | Age: 68
End: 2018-09-24
Payer: COMMERCIAL

## 2018-09-24 ENCOUNTER — APPOINTMENT (OUTPATIENT)
Dept: LAB | Facility: CLINIC | Age: 68
End: 2018-09-24
Payer: COMMERCIAL

## 2018-09-24 VITALS — SYSTOLIC BLOOD PRESSURE: 135 MMHG | HEART RATE: 76 BPM | DIASTOLIC BLOOD PRESSURE: 88 MMHG

## 2018-09-24 DIAGNOSIS — C44.321: Primary | ICD-10-CM

## 2018-09-24 ASSESSMENT — PAIN SCALES - GENERAL
PAINLEVEL: NO PAIN (0)
PAINLEVEL: NO PAIN (0)

## 2018-09-24 NOTE — NURSING NOTE
Closure performed on the right nasal ala. Injected 4.0ml of Xylocaine  (Lidocaine 1% and Epinephrine  (1:100,000)    Defect Photo: DH  Closure Photo:     Present for procedure:   MD Misael Pham, Resident MD Candie Gutierrez CMA    The surgical site was dressed with Vaseline, Telfa and a pressure dressing. Wound care was gone over with the patient, all questions were answered.   Candie Gutierrez CMA

## 2018-09-24 NOTE — MR AVS SNAPSHOT
After Visit Summary   9/24/2018    Juan Jose Razo    MRN: [id number]           Patient Information     Date Of Birth          1950        Visit Information        Provider Department      9/24/2018 8:30 AM Hans Barba MD [facility] Dermatologic Surgery        Today's Diagnoses     Squamous cell carcinoma of skin of ala nasi    -  1      Care Instructions     Wound Care Instructions  I will experience scar, altered skin color, bleeding, swelling, pain, crusting and redness. I may experience altered sensation. Risks are excessive bleeding, infection, muscle weakness, thick (hypertrophic or keloidal) scar, and recurrence,. A second procedure may be recommended to obtain the best cosmetic or functional result.  Possible complications of any surgical procedure are bleeding, infection, scarring, alteration in skin color and sensation, muscle weakness in the area, wound dehiscence or seperation, or recurrence of the lesion or disease. On occasion, after healing, a secondary procedure or revision may be recommended in order to obtain the best cosmetic or functional result.   After your surgery, a pressure bandage will be placed over the area that has sutures. This will help prevent bleeding. Please follow these instructions, as they will help you to prevent complications as your wound heals.  For the First 48 hours After Surgery:  1. Leave the pressure bandage on and keep it dry. If it should come loose, you may retape it, but do not take it off.  2. Relax and take it easy. Do not do any vigorous exercise, heavy lifting, or bending forward. This could cause the wound to bleed.  3. Post-operative pain is usually mild. You may take plain or extra strength Tylenol every 4 hours as needed (do not take more than 4,000mg in one day). Do not take any medicine that contains aspirin, ibuprofen or motrin unless you have been recommended these by a doctor.  Avoid alcohol and vitamin E as these may increase your  tendency to bleed.  4. You may put an ice pack around the bandaged area for 20 minutes every 2-3 hours. This may help reduce swelling, bruising, and pain. Make sure the ice pack is waterproof so that the pressure bandage does not get wet.   5. You may see a small amount of drainage or blood on your pressure bandage. This is normal. However, if drainage or bleeding continues or saturates the bandage, you will need to apply firm pressure over the bandage with a washcloth for 15 minutes. If bleeding continues after applying pressure for 15 minutes then go to the nearest emergency room.  48 Hours After Surgery  Carefully remove the bandage and start daily wound care and dressing changes. You may also now shower and get the wound wet. Wash wound with a mild soap and water.  Use caution when washing the wound. Be gentle and do not let the forceful shower stream hit the wound directly.  PAT dry.  Daily Wound Care:  1. Wash wound with a mild soap and water.  Use caution when washing the wound, be gentle and do not let the forceful shower stream hit the wound directly.  2. PAT DRY.  3. Apply Vaseline (from a new container or tube) over the suture line with a Q-tip. It is very important to keep the wound continuously moist, as wounds heal best in a moist environment.  4.  Keep the site covered until sutures are removed, you can cover it with a Telfa (non-stick) dressing and tape or a band-aid.    5. If you are unable to keep wound covered, you must apply Vaseline every 2 - 3 hours (while awake) to ensure it is being kept moist for optimal healing. A dressing overnight is recommended to keep the area moist.   Call Us If:  1. You have pain that is not controlled with Tylenol.  2. You have signs or symptoms of an infection, such as: fever over 100 degrees F, redness, warmth, or foul-smelling or yellow/creamy drainage from the wound.  Who should I call with questions?    [facility]: [phone]      [facility]: [phone]    For urgent needs outside of business hours call the [facility] at [phone] and ask for the dermatology resident on call              Follow-ups after your visit        Your next 10 appointments already scheduled     Nov 26, 2018  2:00 PM CST   (Arrive by 1:45 PM)   Post-Op with Hans Barba MD   [facility] Dermatologic Surgery ([facility])    [address] [place] [address]  3rd [facility] [address] [phone]              Who to contact     Please call your clinic at [phone] to:    Ask questions about your health    Make or cancel appointments    Discuss your medicines    Learn about your test results    Speak to your doctor            Additional Information About Your Visit        [employer] Information     [employer] gives you secure access to your electronic health record. If you see a primary care provider, you can also send messages to your care team and make appointments. If you have questions, please call your primary care clinic.  If you do not have a primary care provider, please call [phone] and they will assist you.      [employer] is an electronic gateway that provides easy, online access to your medical records. With [employer], you can request a clinic appointment, read your test results, renew a prescription or communicate with your care team.     To access your existing account, please contact your [facility] or call [phone] for assistance.        Care EveryWhere ID     This is your Care EveryWhere ID. This could be used by other organizations to access your [place] medical records  [id number]        Your Vitals Were     Pulse                   76            Blood Pressure from Last 3 Encounters:   09/24/18 135/88   05/23/18 134/85   12/12/17 126/80    Weight from Last 3 Encounters:   05/23/18 129.5 kg (285 lb 8 oz)   12/12/17 130.6 kg (288 lb)   10/24/17 127.5 kg  (281 lb)              Today, you had the following     No orders found for display       Primary Care Provider Office Phone # Fax #    Alphonso Ceasar Grover [phone]       [address] [facility] [address]  [facility] [address]        Equal Access to Services     YUNIOR FRASER : Hadii dawn ku hadnoraho Soomaali, waaxda luqadaha, qaybta kaalmada adeegyada, waxjackelin morse shelbinixon ramesh albinaaleksander kelly . So [facility] [phone].    ATENCIÓN: Si habla español, tiene a lo disposición servicios gratuitos de asistencia lingüística. Llame al [phone].    We comply with applicable federal civil rights laws and [place] laws. We do not discriminate on the basis of race, color, national origin, age, disability, sex, sexual orientation, or gender identity.            Thank you!     Thank you for choosing [facility] DERMATOLOGIC SURGERY  for your care. Our goal is always to provide you with excellent care. Hearing back from our patients is one way we can continue to improve our services. Please take a few minutes to complete the written survey that you may receive in the mail after your visit with us. Thank you!             Your Updated Medication List - Protect others around you: Learn how to safely use, store and throw away your medicines at www.disposemymeds.org.          This list is accurate as of 9/24/18 11:35 AM.  Always use your most recent med list.                   Brand Name Dispense Instructions for use Diagnosis    amLODIPine 5 MG tablet    NORVASC    180 tablet    TAKE TWO TABLETS BY MOUTH EVERY DAY    Hypertension goal BP (blood pressure) < 140/90       CENTRUM SILVER ULTRA MENS PO           cholecalciferol 1000 UNIT tablet    vitamin D3     Take by mouth daily        clotrimazole 1 % cream    LOTRIMIN    30 g    Apply topically 2 times daily    Tinea corporis       Desloratadine-Pseudoephedrine 5-240 MG Tb24    CLARINEX-D 24 HOUR    30 tablet    Take 1 tablet by mouth daily as needed    Acute seasonal  allergic rhinitis due to pollen       hydrocortisone 0.2 % cream    WESTCORT    60 g    Apply topically 2 times daily    Intertrigo       hydrocortisone 2.5 % ointment     30 g    Apply topically 2 times daily as directed    Intertrigo       LANsoprazole 30 MG CR capsule    PREVACID    90 capsule    TAKE ONE CAPSULE BY MOUTH EVERY DAY    Gastroesophageal reflux disease without esophagitis       metoprolol succinate 100 MG 24 hr tablet    TOPROL-XL    90 tablet    TAKE ONE TABLET BY MOUTH EVERY DAY    Hypertension goal BP (blood pressure) < 140/90       nystatin cream    MYCOSTATIN    60 g    Apply topically 3 times daily    Intertrigo       ranitidine 300 MG tablet    ZANTAC    90 tablet    TAKE ONE TABLET BY MOUTH EVERY NIGHT AT BEDTIME    Gastroesophageal reflux disease without esophagitis       * sildenafil 100 MG tablet    VIAGRA    6 tablet    Take 0.5-1 tablets ( mg) by mouth daily as needed for erectile dysfunction Take 30 min to 4 hours before intercourse.  Never use with nitroglycerin, terazosin or doxazosin.    ED (erectile dysfunction)       * sildenafil 100 MG tablet    VIAGRA    6 tablet    Take 0.5-1 tablets ( mg) by mouth daily as needed for erectile dysfunction Take 30 min to 4 hours before intercourse.  Never use with nitroglycerin, terazosin or doxazosin.    Erectile dysfunction due to arterial insufficiency       * Notice:  This list has 2 medication(s) that are the same as other medications prescribed for you. Read the directions carefully, and ask your doctor or other care provider to review them with you.

## 2018-09-24 NOTE — PROGRESS NOTES
MOHS MICROGRAPHIC SURGERY REPORT   September 24, 2018    Dermatology Surgery Clinic  [facility]  [address] [place] [id number]    Surgeon:  Hans Barba MD  Scrub:  Ly Rodgers LPN    INDICATION:    Preoperative Diagnosis: primary squamous cell carcinoma, well differentiated subtype  Location: R nasal ala  Postoperative Diagnosis: same  Preoperative Lesion size: 0.2 x 0.2 cm    After appropriate discussion and informed consent for Mohs surgery and possible repair of the Mohs surgery defect, the patient underwent Mohs surgery as follows:    STAGE I:  The patient was placed on the operating room table.  The area was cleansed with chlorhexidine and infiltrated with 1% Lidocaine and epinephrine. Tumor was debulked. Using a #15-blade, complete excision was made around the tumor in 1 section.  Hemostasis was obtained by electrodesiccation.  A dressing was placed.  Tissue was divided into 1 tissue block that was subsequently mapped, color coded and processed in the Mohs Laboratory.  Microscopic tumor was not found in the tissue block.    With the lesion clear of micrographic tumor, surgery was considered complete.  The defect extended to the fascia and measured 0.9 x 0.9 cm.    RECONSTRUCTIVE DERMATOLOGIC SURGERY REPORT  We discussed the options for wound management in full with the patient including risks/benefits/possible outcomes.     Island Pedicle Advancement Flap:  Because of the full-thickness nature of the defect, and as not to distort the surrounding structures with deep intradermal 5-0 Monocryl and superficial running 5-0 fast absorbing gut sutures, an island pedicle flap was carefully planned. The site was prepped with chlorhexidine,  local anesthesia administered, and the patient draped in a sterile fashion.  A triangular incision was made laterally and extended inferiorly to the lateral primary defect, biplanar undermining was performed and the flap  carefully dissected to a superior muscular subcutaneous pedicle.  The skin surrounding the flap was undermined to allow for closure of the donor site defect and after hemostasis was obtained the flap was advanced into place, sutured in a layered fashion and a dressing was applied.  Advancement flap size was 3.0 x 1.5 cm.   Estimated blood loss, minimal; complications, none; bandaging and wound care, routine. Total anesthesia 1.0 ml. Patient was discharged in good condition and will return for assessment of surgical repair in 2 months if desired. Patient was given written and verbal wound care procedures prior to discharge.    Staff Involved:    Scribe Disclosure  I, Ramin Sierra, am serving as a scribe to document services personally performed by Dr. Hans Barba, based on data collection and the provider's statements to me.     Attending attestation:  I personally performed the entire procedure.  I have reviewed the note and edited it as necessary, and agree with its contents.    During tear down of the Mohs tray nurse Ana Maria suffered a sharps injury (15 blade to R index finger) while attempting to remove the blade from the handle.  The patient was agreeable with having blood drawn.  He signed consent and was taken to the lab.    Hans Barba M.D.    Director of Dermatologic Surgery  Department of Dermatology  [facility]

## 2018-09-24 NOTE — NURSING NOTE
1st layer performed on the right nasal ala. Injected 1.0ml of Xylocaine  (Lidocaine 1% and Epinephrine  (1:100,000))      Present for procedure:  Dr. Freda Edwards, RN  Ly Rodgers LPN

## 2018-09-24 NOTE — NURSING NOTE
Chief Complaint   Patient presents with     Derm Problem     mohs x1 R nasal ala     Deepthi Park, EMT

## 2018-09-24 NOTE — PATIENT INSTRUCTIONS
Wound Care Instructions  I will experience scar, altered skin color, bleeding, swelling, pain, crusting and redness. I may experience altered sensation. Risks are excessive bleeding, infection, muscle weakness, thick (hypertrophic or keloidal) scar, and recurrence,. A second procedure may be recommended to obtain the best cosmetic or functional result.  Possible complications of any surgical procedure are bleeding, infection, scarring, alteration in skin color and sensation, muscle weakness in the area, wound dehiscence or seperation, or recurrence of the lesion or disease. On occasion, after healing, a secondary procedure or revision may be recommended in order to obtain the best cosmetic or functional result.   After your surgery, a pressure bandage will be placed over the area that has sutures. This will help prevent bleeding. Please follow these instructions, as they will help you to prevent complications as your wound heals.  For the First 48 hours After Surgery:  1. Leave the pressure bandage on and keep it dry. If it should come loose, you may retape it, but do not take it off.  2. Relax and take it easy. Do not do any vigorous exercise, heavy lifting, or bending forward. This could cause the wound to bleed.  3. Post-operative pain is usually mild. You may take plain or extra strength Tylenol every 4 hours as needed (do not take more than 4,000mg in one day). Do not take any medicine that contains aspirin, ibuprofen or motrin unless you have been recommended these by a doctor.  Avoid alcohol and vitamin E as these may increase your tendency to bleed.  4. You may put an ice pack around the bandaged area for 20 minutes every 2-3 hours. This may help reduce swelling, bruising, and pain. Make sure the ice pack is waterproof so that the pressure bandage does not get wet.   5. You may see a small amount of drainage or blood on your pressure bandage. This is normal. However, if drainage or bleeding continues or  saturates the bandage, you will need to apply firm pressure over the bandage with a washcloth for 15 minutes. If bleeding continues after applying pressure for 15 minutes then go to the nearest emergency room.  48 Hours After Surgery  Carefully remove the bandage and start daily wound care and dressing changes. You may also now shower and get the wound wet. Wash wound with a mild soap and water.  Use caution when washing the wound. Be gentle and do not let the forceful shower stream hit the wound directly.  PAT dry.  Daily Wound Care:  1. Wash wound with a mild soap and water.  Use caution when washing the wound, be gentle and do not let the forceful shower stream hit the wound directly.  2. PAT DRY.  3. Apply Vaseline (from a new container or tube) over the suture line with a Q-tip. It is very important to keep the wound continuously moist, as wounds heal best in a moist environment.  4.  Keep the site covered until sutures are removed, you can cover it with a Telfa (non-stick) dressing and tape or a band-aid.    5. If you are unable to keep wound covered, you must apply Vaseline every 2 - 3 hours (while awake) to ensure it is being kept moist for optimal healing. A dressing overnight is recommended to keep the area moist.   Call Us If:  1. You have pain that is not controlled with Tylenol.  2. You have signs or symptoms of an infection, such as: fever over 100 degrees F, redness, warmth, or foul-smelling or yellow/creamy drainage from the wound.  Who should I call with questions?    [facility]: [phone]     [facility]: [phone]    For urgent needs outside of business hours call the [facility] at [phone] and ask for the dermatology resident on call

## 2018-09-24 NOTE — LETTER
9/24/2018       RE: Juan Jose Razo  [address] [facility] [address]     Dear Colleague,    Thank you for referring your patient, Juan Jose Razo, to the [facility] DERMATOLOGIC SURGERY at [facility]. Please see a copy of my visit note below.    MOHS MICROGRAPHIC SURGERY REPORT   September 24, 2018    Dermatology Surgery Clinic  [facility]  Clinics and Surgery Center  [address] [place] [address]    Surgeon:  Hans Barba MD  Scrub:  Ly Rodgers LPN    INDICATION:    Preoperative Diagnosis: primary squamous cell carcinoma, well differentiated subtype  Location: R nasal ala  Postoperative Diagnosis: same  Preoperative Lesion size: 0.2 x 0.2 cm    After appropriate discussion and informed consent for Mohs surgery and possible repair of the Mohs surgery defect, the patient underwent Mohs surgery as follows:    STAGE I:  The patient was placed on the operating room table.  The area was cleansed with chlorhexidine and infiltrated with 1% Lidocaine and epinephrine. Tumor was debulked. Using a #15-blade, complete excision was made around the tumor in 1 section.  Hemostasis was obtained by electrodesiccation.  A dressing was placed.  Tissue was divided into 1 tissue block that was subsequently mapped, color coded and processed in the Mohs Laboratory.  Microscopic tumor was not found in the tissue block.    With the lesion clear of micrographic tumor, surgery was considered complete.  The defect extended to the fascia and measured 0.9 x 0.9 cm.    RECONSTRUCTIVE DERMATOLOGIC SURGERY REPORT  We discussed the options for wound management in full with the patient including risks/benefits/possible outcomes.     Island Pedicle Advancement Flap:  Because of the full-thickness nature of the defect, and as not to distort the surrounding structures with deep intradermal 5-0 Monocryl and superficial running 5-0 fast absorbing gut sutures, an island pedicle  flap was carefully planned. The site was prepped with chlorhexidine,  local anesthesia administered, and the patient draped in a sterile fashion.  A triangular incision was made laterally and extended inferiorly to the lateral primary defect, biplanar undermining was performed and the flap carefully dissected to a superior muscular subcutaneous pedicle.  The skin surrounding the flap was undermined to allow for closure of the donor site defect and after hemostasis was obtained the flap was advanced into place, sutured in a layered fashion and a dressing was applied.  Advancement flap size was 3.0 x 1.5 cm.   Estimated blood loss, minimal; complications, none; bandaging and wound care, routine. Total anesthesia 1.0 ml. Patient was discharged in good condition and will return for assessment of surgical repair in 2 months if desired. Patient was given written and verbal wound care procedures prior to discharge.    Staff Involved:    Scribe Disclosure  I, Ramin Sierra, am serving as a scribe to document services personally performed by Dr. Hans Barba, based on data collection and the provider's statements to me.     Attending attestation:  I personally performed the entire procedure.  I have reviewed the note and edited it as necessary, and agree with its contents.    During tear down of the Mohs tray nurse Ana Maria suffered a sharps injury (15 blade to R index finger) while attempting to remove the blade from the handle.  The patient was agreeable with having blood drawn.  He signed consent and was taken to the lab.    Again, thank you for allowing me to participate in the care of your patient.      Sincerely,    Hans Barba MD

## 2018-09-25 ENCOUNTER — TELEPHONE (OUTPATIENT)
Dept: DERMATOLOGY | Facility: CLINIC | Age: 68
End: 2018-09-25

## 2018-09-25 NOTE — TELEPHONE ENCOUNTER
Follow up call completed following Mohs procedure with Dr. Box.     The following questions were asked:    What are you doing to manage your pain? Tylenol  Is it helping? Yes  Are you applying ice?  Yes  Have you had any noticeable bleeding through the bandage? no   Do you have any concerns? None at this time.          Please call [phone] option 3 if you have any additional questions.

## 2018-10-09 ENCOUNTER — OFFICE VISIT (OUTPATIENT)
Dept: FAMILY MEDICINE | Facility: CLINIC | Age: 68
End: 2018-10-09
Payer: COMMERCIAL

## 2018-10-09 VITALS
TEMPERATURE: 97.6 F | DIASTOLIC BLOOD PRESSURE: 87 MMHG | SYSTOLIC BLOOD PRESSURE: 136 MMHG | BODY MASS INDEX: 35.75 KG/M2 | OXYGEN SATURATION: 95 % | HEART RATE: 79 BPM | WEIGHT: 286 LBS

## 2018-10-09 DIAGNOSIS — K21.9 GASTROESOPHAGEAL REFLUX DISEASE WITHOUT ESOPHAGITIS: ICD-10-CM

## 2018-10-09 DIAGNOSIS — Z23 NEED FOR PROPHYLACTIC VACCINATION AND INOCULATION AGAINST INFLUENZA: Primary | ICD-10-CM

## 2018-10-09 DIAGNOSIS — I10 HYPERTENSION GOAL BP (BLOOD PRESSURE) < 140/90: ICD-10-CM

## 2018-10-09 DIAGNOSIS — Z00.00 MEDICARE ANNUAL WELLNESS VISIT, INITIAL: ICD-10-CM

## 2018-10-09 DIAGNOSIS — Z23 NEED FOR PROPHYLACTIC VACCINATION AGAINST STREPTOCOCCUS PNEUMONIAE (PNEUMOCOCCUS): ICD-10-CM

## 2018-10-09 LAB
ALBUMIN SERPL-MCNC: 3.8 G/DL (ref 3.4–5)
ALP SERPL-CCNC: 84 U/L (ref 40–150)
ALT SERPL W P-5'-P-CCNC: 54 U/L (ref 0–70)
ANION GAP SERPL CALCULATED.3IONS-SCNC: 8 MMOL/L (ref 3–14)
AST SERPL W P-5'-P-CCNC: 53 U/L (ref 0–45)
BILIRUB SERPL-MCNC: 1 MG/DL (ref 0.2–1.3)
BUN SERPL-MCNC: 12 MG/DL (ref 7–30)
CALCIUM SERPL-MCNC: 8.9 MG/DL (ref 8.5–10.1)
CHLORIDE SERPL-SCNC: 103 MMOL/L (ref 94–109)
CHOLEST SERPL-MCNC: 141 MG/DL
CO2 SERPL-SCNC: 25 MMOL/L (ref 20–32)
CREAT SERPL-MCNC: 0.96 MG/DL (ref 0.66–1.25)
GFR SERPL CREATININE-BSD FRML MDRD: 79 ML/MIN/1.7M2
GLUCOSE SERPL-MCNC: 97 MG/DL (ref 70–99)
HDLC SERPL-MCNC: 33 MG/DL
LDLC SERPL CALC-MCNC: 74 MG/DL
NONHDLC SERPL-MCNC: 108 MG/DL
POTASSIUM SERPL-SCNC: 4.3 MMOL/L (ref 3.4–5.3)
PROT SERPL-MCNC: 8.4 G/DL (ref 6.8–8.8)
SODIUM SERPL-SCNC: 136 MMOL/L (ref 133–144)
TRIGL SERPL-MCNC: 170 MG/DL

## 2018-10-09 PROCEDURE — 90670 PCV13 VACCINE IM: CPT | Performed by: FAMILY MEDICINE

## 2018-10-09 PROCEDURE — G0008 ADMIN INFLUENZA VIRUS VAC: HCPCS | Performed by: FAMILY MEDICINE

## 2018-10-09 PROCEDURE — 36415 COLL VENOUS BLD VENIPUNCTURE: CPT | Performed by: FAMILY MEDICINE

## 2018-10-09 PROCEDURE — 90662 IIV NO PRSV INCREASED AG IM: CPT | Performed by: FAMILY MEDICINE

## 2018-10-09 PROCEDURE — G0438 PPPS, INITIAL VISIT: HCPCS | Performed by: FAMILY MEDICINE

## 2018-10-09 PROCEDURE — 80061 LIPID PANEL: CPT | Performed by: FAMILY MEDICINE

## 2018-10-09 PROCEDURE — G0009 ADMIN PNEUMOCOCCAL VACCINE: HCPCS | Performed by: FAMILY MEDICINE

## 2018-10-09 PROCEDURE — 80053 COMPREHEN METABOLIC PANEL: CPT | Performed by: FAMILY MEDICINE

## 2018-10-09 RX ORDER — AMLODIPINE BESYLATE 5 MG/1
TABLET ORAL
Qty: 180 TABLET | Refills: 3 | Status: SHIPPED | OUTPATIENT
Start: 2018-10-09 | End: 2019-10-24

## 2018-10-09 RX ORDER — LANSOPRAZOLE 30 MG/1
30 CAPSULE, DELAYED RELEASE ORAL DAILY
Qty: 90 CAPSULE | Refills: 3 | Status: SHIPPED | OUTPATIENT
Start: 2018-10-09 | End: 2019-10-24

## 2018-10-09 RX ORDER — METOPROLOL SUCCINATE 100 MG/1
100 TABLET, EXTENDED RELEASE ORAL DAILY
Qty: 90 TABLET | Refills: 3 | Status: SHIPPED | OUTPATIENT
Start: 2018-10-09 | End: 2020-01-07

## 2018-10-09 NOTE — MR AVS SNAPSHOT
After Visit Summary   10/9/2018    Juan Jose Razo    MRN: [id number]           Patient Information     Date Of Birth          1950        Visit Information        Provider Department      10/9/2018 10:00 AM [place]phonso [place] [facility] [place]        Today's Diagnoses     Need for prophylactic vaccination and inoculation against influenza    -  1    Medicare annual wellness visit, initial        Hypertension goal BP (blood pressure) < 140/90        Need for prophylactic vaccination against Streptococcus pneumoniae (pneumococcus)        Gastroesophageal reflux disease without esophagitis          Care Instructions      Preventive Health Recommendations:       Male Ages 65 and over    Yearly exam:             See your health care provider every year in order to  o   Review health changes.   o   Discuss preventive care.    o   Review your medicines if your doctor has prescribed any.    Talk with your health care provider about whether you should have a test to screen for prostate cancer (PSA).    Every 3 years, have a diabetes test (fasting glucose). If you are at risk for diabetes, you should have this test more often.    Every 5 years, have a cholesterol test. Have this test more often if you are at risk for high cholesterol or heart disease.     Every 10 years, have a colonoscopy. Or, have a yearly FIT test (stool test). These exams will check for colon cancer.    Talk to with your health care provider about screening for Abdominal Aortic Aneurysm if you have a family history of AAA or have a history of smoking.  Shots:     Get a flu shot each year.     Get a tetanus shot every 10 years.     Talk to your doctor about your pneumonia vaccines. There are now two you should receive - Pneumovax (PPSV 23) and Prevnar (PCV 13).    Talk to your pharmacist about a shingles vaccine.     Talk to your doctor about the hepatitis B vaccine.  Nutrition:     Eat at least 5 servings of fruits  and vegetables each day.     Eat whole-grain bread, whole-wheat pasta and brown rice instead of white grains and rice.     Get adequate Calcium and Vitamin D.   Lifestyle    Exercise for at least 150 minutes a week (30 minutes a day, 5 days a week). This will help you control your weight and prevent disease.     Limit alcohol to one drink per day.     No smoking.     Wear sunscreen to prevent skin cancer.     See your dentist every six months for an exam and cleaning.     See your eye doctor every 1 to 2 years to screen for conditions such as glaucoma, macular degeneration and cataracts.          Follow-ups after your visit        Follow-up notes from your care team     Return in about 6 months (around 4/9/2019) for BP Recheck, Routine Visit, Lab Work.      Your next 10 appointments already scheduled     Nov 26, 2018  2:00 PM CST   (Arrive by 1:45 PM)   Post-Op with Hans Barba MD   [facility] Dermatologic Surgery ([facility])    [address] [place] [id number] [phone]              Who to contact     If you have questions or need follow up information about today's clinic visit or your schedule please contact [facility] directly at [phone].  Normal or non-critical lab and imaging results will be communicated to you by MyChart, letter or phone within 4 business days after the clinic has received the results. If you do not hear from us within 7 days, please contact the clinic through [employer] or phone. If you have a critical or abnormal lab result, we will notify you by phone as soon as possible.  Submit refill requests through [employer] or call your pharmacy and they will forward the refill request to us. Please allow 3 business days for your refill to be completed.          Additional Information About Your Visit        Net[employer] Information     [employer] gives you secure access to your electronic health record. If you see a primary care provider,  you can also send messages to your care team and make appointments. If you have questions, please call your primary care clinic.  If you do not have a primary care provider, please call [phone] and they will assist you.        Care EveryWhere ID     This is your Care EveryWhere ID. This could be used by other organizations to access your [facility] medical records  [id number]        Your Vitals Were     Pulse Temperature Pulse Oximetry BMI (Body Mass Index)          79 97.6  F (36.4  C) (Oral) 95% 35.75 kg/m2         Blood Pressure from Last 3 Encounters:   10/09/18 136/87   09/24/18 135/88   05/23/18 134/85    Weight from Last 3 Encounters:   10/09/18 286 lb (129.7 kg)   05/23/18 285 lb 8 oz (129.5 kg)   12/12/17 288 lb (130.6 kg)              We Performed the Following     ADMIN INFLUENZA (For MEDICARE Patients ONLY) []     ADMIN PNEUMOCOCCAL VACCINE     Comprehensive metabolic panel (BMP + Alb, Alk Phos, ALT, AST, Total. Bili, TP)     FLU VACCINE, INCREASED ANTIGEN, PRESV FREE, AGE 65+ [48456]     Lipid Profile     Pneumococcal vaccine 13 valent PCV13 IM (Prevnar) [88837]          Today's Medication Changes          These changes are accurate as of 10/9/18 11:55 AM.  If you have any questions, ask your nurse or doctor.               These medicines have changed or have updated prescriptions.        Dose/Directions    LANsoprazole 30 MG CR capsule   Commonly known as:  PREVACID   This may have changed:  See the new instructions.   Used for:  Gastroesophageal reflux disease without esophagitis   Changed by:  Alphonso Grover MD        Dose:  30 mg   Take 1 capsule (30 mg) by mouth daily   Quantity:  90 capsule   Refills:  3       metoprolol succinate 100 MG 24 hr tablet   Commonly known as:  TOPROL-XL   This may have changed:  See the new instructions.   Used for:  Hypertension goal BP (blood pressure) < 140/90   Changed by:  Alphonso Grover MD        Dose:  100 mg   Take 1 tablet (100  mg) by mouth daily   Quantity:  90 tablet   Refills:  3            Where to get your medicines      These medications were sent to [facility] [place], [place] - [address]  [address] [facility] [address]     Phone:  [phone]     amLODIPine 5 MG tablet    LANsoprazole 30 MG CR capsule    metoprolol succinate 100 MG 24 hr tablet    ranitidine 300 MG tablet                Primary Care Provider Office Phone # Fax #    Alphonso Ceasar Grover [phone]       [address]  [facility] [address]        Equal Access to Services     [facility]: Hadii aad princess hadasho Sochauncey, waaxda luqadaha, qaybta kaalmada adeegyada, jumana morse hayjodie kelly . So [facility] [place] [phone].    ATENCIÓN: Si habla español, tiene a lo disposición servicios gratuitos de asistencia lingüística. [facility] [phone].    We comply with applicable federal civil rights laws and [place] laws. We do not discriminate on the basis of race, color, national origin, age, disability, sex, sexual orientation, or gender identity.            Thank you!     Thank you for choosing [facility]  for your care. Our goal is always to provide you with excellent care. Hearing back from our patients is one way we can continue to improve our services. Please take a few minutes to complete the written survey that you may receive in the mail after your visit with us. Thank you!             Your Updated Medication List - Protect others around you: Learn how to safely use, store and throw away your medicines at www.disposemymeds.org.          This list is accurate as of 10/9/18 11:55 AM.  Always use your most recent med list.                   Brand Name Dispense Instructions for use Diagnosis    amLODIPine 5 MG tablet    NORVASC    180 tablet    TAKE TWO TABLETS BY MOUTH EVERY DAY    Hypertension goal BP (blood pressure) < 140/90       CENTRUM SILVER ULTRA  MENS PO           cholecalciferol 1000 UNIT tablet    vitamin D3     Take by mouth daily        clotrimazole 1 % cream    LOTRIMIN    30 g    Apply topically 2 times daily    Tinea corporis       Desloratadine-Pseudoephedrine 5-240 MG Tb24    CLARINEX-D 24 HOUR    30 tablet    Take 1 tablet by mouth daily as needed    Acute seasonal allergic rhinitis due to pollen       hydrocortisone 0.2 % cream    WESTCORT    60 g    Apply topically 2 times daily    Intertrigo       hydrocortisone 2.5 % ointment     30 g    Apply topically 2 times daily as directed    Intertrigo       LANsoprazole 30 MG CR capsule    PREVACID    90 capsule    Take 1 capsule (30 mg) by mouth daily    Gastroesophageal reflux disease without esophagitis       metoprolol succinate 100 MG 24 hr tablet    TOPROL-XL    90 tablet    Take 1 tablet (100 mg) by mouth daily    Hypertension goal BP (blood pressure) < 140/90       nystatin cream    MYCOSTATIN    60 g    Apply topically 3 times daily    Intertrigo       ranitidine 300 MG tablet    ZANTAC    90 tablet    TAKE ONE TABLET BY MOUTH EVERY NIGHT AT BEDTIME    Gastroesophageal reflux disease without esophagitis       * sildenafil 100 MG tablet    VIAGRA    6 tablet    Take 0.5-1 tablets ( mg) by mouth daily as needed for erectile dysfunction Take 30 min to 4 hours before intercourse.  Never use with nitroglycerin, terazosin or doxazosin.    ED (erectile dysfunction)       * sildenafil 100 MG tablet    VIAGRA    6 tablet    Take 0.5-1 tablets ( mg) by mouth daily as needed for erectile dysfunction Take 30 min to 4 hours before intercourse.  Never use with nitroglycerin, terazosin or doxazosin.    Erectile dysfunction due to arterial insufficiency       * Notice:  This list has 2 medication(s) that are the same as other medications prescribed for you. Read the directions carefully, and ask your doctor or other care provider to review them with you.

## 2018-10-09 NOTE — PROGRESS NOTES
SUBJECTIVE:   Juan Jose Razo is a 67 year old male who presents for Preventive Visit.    Are you in the first 12 months of your Medicare Part B coverage?  Yes- has eye exam every 2 years    Healthy Habits:    Do you get at least three servings of calcium containing foods daily (dairy, green leafy vegetables, etc.)? Unknown     Amount of exercise or daily activities, outside of work: 0 day(s) per week    Problems taking medications regularly No    Medication side effects: No    Have you had an eye exam in the past two years? yes    Do you see a dentist twice per year? yes    Do you have sleep apnea, excessive snoring or daytime drowsiness?yes- uses CPAP machine      Ability to successfully perform activities of daily living: Yes, no assistance needed    Home safety:  throw rugs in the hallway and lack of grab bars in the bathroom     Hearing impairment: No    Fall risk:  Fallen 2 or more times in the past year?: No  Any fall with injury in the past year?: No        COGNITIVE SCREEN  1) Repeat 3 items (Leader, Season, Table)    2) Clock draw: NORMAL  3) 3 item recall: Recalls 3 objects  Results: 3 items recalled: COGNITIVE IMPAIRMENT LESS LIKELY    Mini-CogTM Copyright S [place]. Licensed by the author for use in [facility]; reprinted with permission ([email or web address]@.[email or web address]). All rights reserved.            Hypertension Follow-up      Outpatient blood pressures are not being checked.    Low Salt Diet: no added salt    Encounter Diagnoses   Name Primary?     Medicare annual wellness visit, initial      Hypertension goal BP (blood pressure) < 140/90      Need for prophylactic vaccination against Streptococcus pneumoniae (pneumococcus)      Need for prophylactic vaccination and inoculation against influenza Yes     Gastroesophageal reflux disease without esophagitis Well controlled , using medication prn         Reviewed and updated as needed this visit by clinical staff  Tobacco  Allergies  Meds  Med Hx  Surg  Hx  Fam Hx  Soc Hx        Reviewed and updated as needed this visit by Provider        Social History   Substance Use Topics     Smoking status: Never Smoker     Smokeless tobacco: Never Used     Alcohol use Yes      Comment: 1-2 beers a week        If you drink alcohol do you typically have >3 drinks per day or >7 drinks per week? No                        Today's PHQ-2 Score:   PHQ-2 ( 1999 Pfizer) 10/9/2018 9/24/2018   Q1: Little interest or pleasure in doing things 0 0   Q2: Feeling down, depressed or hopeless 0 0   PHQ-2 Score 0 0   Q1: Little interest or pleasure in doing things - -   Q2: Feeling down, depressed or hopeless - -   PHQ-2 Score - -       Do you feel safe in your environment - Yes    Do you have a Health Care Directive?: Yes: Patient states has Advance Directive and will bring in a copy to clinic.    Current providers sharing in care for this patient include:   Patient Care Team:  Alphonso Grover MD as PCP - General (Family Practice)  Imelda Rincon PA-C as Physician Assistant (Physician Assistant)  Servando Diane MD as MD (Gastroenterology)    The following health maintenance items are reviewed in Epic and correct as of today:  Health Maintenance   Topic Date Due     HIV SCREEN (SYSTEM ASSIGNED)  12/17/1970     PNEUMOCOCCAL (2 of 2 - PPSV23) 12/17/2017     AORTIC ANEURYSM SCREENING (SYSTEM ASSIGNED)  12/17/2017     INFLUENZA VACCINE (1) 09/01/2018     FALL RISK ASSESSMENT  09/24/2019     PHQ-2 Q1 YR  09/24/2019     LIPID SCREEN Q5 YR MALE (SYSTEM ASSIGNED)  06/26/2022     ADVANCE DIRECTIVE PLANNING Q5 YRS  05/23/2023     COLON CANCER SCREEN (SYSTEM ASSIGNED)  03/18/2026     TETANUS IMMUNIZATION (SYSTEM ASSIGNED)  06/26/2027     HEPATITIS C SCREENING  Completed     Labs reviewed in EPIC    Pneumonia Vaccine:Adults age 65+ who received Pneumovax (PPSV23) at 65 years or older: Should be given PCV13 > 1 year after their most recent PPSV23    ROS:  Constitutional, HEENT,  "cardiovascular, pulmonary, gi and gu systems are negative, except as otherwise noted.    OBJECTIVE:   /87 (BP Location: Left arm, Patient Position: Sitting, Cuff Size: Adult Large)  Pulse 79  Temp 97.6  F (36.4  C) (Oral)  Wt 286 lb (129.7 kg)  SpO2 95%  BMI 35.75 kg/m2 Estimated body mass index is 35.75 kg/(m^2) as calculated from the following:    Height as of 12/12/17: 6' 3\" (1.905 m).    Weight as of this encounter: 286 lb (129.7 kg).  EXAM:   GENERAL: healthy, alert and no distress  EYES: Eyes grossly normal to inspection, PERRL and conjunctivae and sclerae normal  HENT: ear canals and TM's normal, nose and mouth without ulcers or lesions  NECK: no adenopathy, no asymmetry, masses, or scars and thyroid normal to palpation  RESP: lungs clear to auscultation - no rales, rhonchi or wheezes  CV: regular rate and rhythm, normal S1 S2, no S3 or S4, no murmur, click or rub, no peripheral edema and peripheral pulses strong  CV: varicosities extesive in both lower legs  ABDOMEN: soft, nontender, no hepatosplenomegaly, no masses and bowel sounds normal   (male): normal male genitalia without lesions or urethral discharge, no hernia  RECTAL (male): normal sphincter tone, no rectal masses, prostate normal size, smooth, nontender without nodules or masses  MS: no gross musculoskeletal defects noted, no edema  SKIN: no suspicious lesions or rashes  NEURO: Normal strength and tone, mentation intact and speech normal  BACK: no CVA tenderness, no paralumbar tenderness  PSYCH: mentation appears normal, affect normal/bright  LYMPH: no cervical, supraclavicular, axillary, or inguinal adenopathy    Diagnostic Test Results:  none     ASSESSMENT / PLAN:   1. Medicare annual wellness visit, initial  In good health, needs to work on diet/ exercise  - Comprehensive metabolic panel (BMP + Alb, Alk Phos, ALT, AST, Total. Bili, TP)  - Lipid Profile    2. Hypertension goal BP (blood pressure) < 140/90  Well controlled   - " "Comprehensive metabolic panel (BMP + Alb, Alk Phos, ALT, AST, Total. Bili, TP)  - amLODIPine (NORVASC) 5 MG tablet; TAKE TWO TABLETS BY MOUTH EVERY DAY  Dispense: 180 tablet; Refill: 3  - metoprolol succinate (TOPROL-XL) 100 MG 24 hr tablet; Take 1 tablet (100 mg) by mouth daily  Dispense: 90 tablet; Refill: 3    3. Need for prophylactic vaccination against Streptococcus pneumoniae (pneumococcus)  done  - Pneumococcal vaccine 13 valent PCV13 IM (Prevnar) [50670]  - ADMIN PNEUMOCOCCAL VACCINE    4. Need for prophylactic vaccination and inoculation against influenza  done  - FLU VACCINE, INCREASED ANTIGEN, PRESV FREE, AGE 65+ [40217]  - ADMIN INFLUENZA (For MEDICARE Patients ONLY) []    5. Gastroesophageal reflux disease without esophagitis  discused medication/ risks/ benefits  - ranitidine (ZANTAC) 300 MG tablet; TAKE ONE TABLET BY MOUTH EVERY NIGHT AT BEDTIME  Dispense: 90 tablet; Refill: 3  - LANsoprazole (PREVACID) 30 MG CR capsule; Take 1 capsule (30 mg) by mouth daily  Dispense: 90 capsule; Refill: 3    End of Life Planning:  Patient currently has an advanced directive: No.  I have verified the patient's ablity to prepare an advanced directive/make health care decisions.  Literature was provided to assist patient in preparing an advanced directive.    COUNSELING:  Reviewed preventive health counseling, as reflected in patient instructions       Regular exercise       Healthy diet/nutrition       Vision screening       Hearing screening       Dental care       Immunizations    Vaccinated for: Influenza and Pneumococcal             Hepatitis C screening       Colon cancer screening       Prostate cancer screening    BP Readings from Last 1 Encounters:   10/09/18 136/87     Estimated body mass index is 35.75 kg/(m^2) as calculated from the following:    Height as of 12/12/17: 6' 3\" (1.905 m).    Weight as of this encounter: 286 lb (129.7 kg).      Weight management plan: Discussed healthy diet and exercise " guidelines and patient will follow up in 12 months in clinic to re-evaluate.     reports that he has never smoked. He has never used smokeless tobacco.      Appropriate preventive services were discussed with this patient, including applicable screening as appropriate for cardiovascular disease, diabetes, osteopenia/osteoporosis, and glaucoma.  As appropriate for age/gender, discussed screening for colorectal cancer, prostate cancer, breast cancer, and cervical cancer. Checklist reviewing preventive services available has been given to the patient.    Reviewed patients plan of care and provided an AVS. The Basic Care Plan (routine screening as documented in Health Maintenance) for Juan Jose meets the Care Plan requirement. This Care Plan has been established and reviewed with the Patient.    Counseling Resources:  ATP IV Guidelines  Pooled Cohorts Equation Calculator  Breast Cancer Risk Calculator  FRAX Risk Assessment  ICSI Preventive Guidelines  Dietary Guidelines for Americans, 2010  USDA's MyPlate  ASA Prophylaxis  Lung CA Screening    Alphonso Grover MD  [facility] [place]    Injectable Influenza Immunization Documentation    1.  Is the person to be vaccinated sick today?   No    2. Does the person to be vaccinated have an allergy to a component   of the vaccine?   No  Egg Allergy Algorithm Link    3. Has the person to be vaccinated ever had a serious reaction   to influenza vaccine in the past?   No    4. Has the person to be vaccinated ever had Guillain-Barré syndrome?   No    Form completed by Elvira Villeda CMA

## 2018-11-26 ENCOUNTER — OFFICE VISIT (OUTPATIENT)
Dept: DERMATOLOGY | Facility: CLINIC | Age: 68
End: 2018-11-26
Payer: COMMERCIAL

## 2018-11-26 DIAGNOSIS — L90.5 SCAR OF SKIN: Primary | ICD-10-CM

## 2018-11-26 NOTE — LETTER
11/26/2018       RE: Juan Jose Razo  [address] [facility] [id number]     Dear Colleague,    Thank you for referring your patient, Juan Jose Razo, to the [facility] DERMATOLOGIC SURGERY at [facility]. Please see a copy of my visit note below.    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 The patient says the site is doing well and denies any bleeding, purulence, or excess pain/tenderness.  Objective: On exam, there is an appropriately healing surgical site on the R nasal ala with no purulence, tenderness or surrounding erythema.  The flap is a little full and the inside of the nostril slightly swollen resulting in mild airway obstruction.    Assessment and Plan: Appropriately healing surgical site without any signs of infection.  Counseled massage for pincushioning/swelling.  Will follow up in a month and consider injection of ILK or dermabrasion if this does not improve as expected.    Instructed to continue daily dressing changes and application of petroleum jelly until healed over with new pink skin and all sutures are dissolved.     Recommended sunscreens SPF #50 or greater and protective clothing. Risk of scar dyspigmentation discussed.     Continue periodic self skin exams and report of any new or changing lesions.     Please refer to previous clinic note for details regarding treatment.  Follow up if any new redness or irritation occurs.    Staff Involved:    Scribe Disclosure  I, Ramin Sierra, am serving as a scribe to document services personally performed by Dr. Hans Barba, based on data  collection and the provider's statements to me.     Attending Attestation  I attest that the Scribe recorded the interview and exam that I personally performed.  I have reviewed the note and edited it as necessary.    Hans Barba M.D.    Director of Dermatologic Surgery  Department of Dermatology  [facility]

## 2018-11-26 NOTE — PROGRESS NOTES
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 The patient says the site is doing well and denies any bleeding, purulence, or excess pain/tenderness.  Objective: On exam, there is an appropriately healing surgical site on the R nasal ala with no purulence, tenderness or surrounding erythema.  The flap is a little full and the inside of the nostril slightly swollen resulting in mild airway obstruction.    Assessment and Plan: Appropriately healing surgical site without any signs of infection.  Counseled massage for pincushioning/swelling.  Will follow up in a month and consider injection of ILK or dermabrasion if this does not improve as expected.    Instructed to continue daily dressing changes and application of petroleum jelly until healed over with new pink skin and all sutures are dissolved.     Recommended sunscreens SPF #50 or greater and protective clothing. Risk of scar dyspigmentation discussed.     Continue periodic self skin exams and report of any new or changing lesions.     Please refer to previous clinic note for details regarding treatment.  Follow up if any new redness or irritation occurs.    Staff Involved:    Scribe Disclosure  I, Ramin Sierra, am serving as a scribe to document services personally performed by Dr. Hans Barba, based on data collection and the provider's statements to me.     Attending Attestation  I attest that the Scribe recorded the interview and exam that I personally performed.  I have reviewed the note and edited it as necessary.    Hans Barba M.D.    Director of Dermatologic Surgery  Department  of Dermatology  [facility]

## 2018-11-26 NOTE — MR AVS SNAPSHOT
After Visit Summary   11/26/2018    Juan Jose Razo    MRN: [id number]           Patient Information     Date Of Birth          1950        Visit Information        Provider Department      11/26/2018 2:00 PM Hans Barba MD [facility] Dermatologic Surgery        Today's Diagnoses     Scar of skin    -  1      Care Instructions    Massage site with Vaseline for 5 minutes twice a day.     Follow up in 2 months.           Follow-ups after your visit        Your next 10 appointments already scheduled     Nov 26, 2018  2:00 PM CST   (Arrive by 1:45 PM)   Post-Op with Hans Barba MD   [facility] Dermatologic Surgery ([facility] [place])    [address] [facility] [id number] [phone]            Dec 18, 2018  9:20 AM CST   Return Sleep Patient with Ernesto Blankenship MD   [facility] ([facility])    [address] [place] [id number] [phone]            Jan 28, 2019  2:00 PM CST   Nurse Visit with  Derm Surg Nurse   [facility] Dermatologic Surgery ([facility] [place])    [address]  [address] [facility] [address] [phone]              Who to contact     Please call your clinic at [phone] to:    Ask questions about your health    Make or cancel appointments    Discuss your medicines    Learn about your test results    Speak to your doctor            Additional Information About Your Visit        MyChart Information     [employer] gives you secure access to your electronic health record. If you see a primary care provider, you can also send messages to your care team and make appointments. If you have questions, please call your primary care clinic.  If you do not have a primary care provider, please call [phone] and they will assist you.      [employer] is an electronic gateway that provides easy, online access to your  medical records. With [facility], you can request a clinic appointment, read your test results, renew a prescription or communicate with your care team.     To access your existing account, please contact your [facility] or call [phone] for assistance.        Care EveryWhere ID     This is your Care EveryWhere ID. This could be used by other organizations to access your [facility] medical records  [id number]         Blood Pressure from Last 3 Encounters:   10/09/18 136/87   09/24/18 135/88   05/23/18 134/85    Weight from Last 3 Encounters:   10/09/18 129.7 kg (286 lb)   05/23/18 129.5 kg (285 lb 8 oz)   12/12/17 130.6 kg (288 lb)              Today, you had the following     No orders found for display       Primary Care Provider Office Phone # Fax #    Alphonso Ceasar Grover [phone]       [address]  [facility] [address]        Equal Access to Services     [facility] : Hadii aad ku hadasho Soomaali, waaxda luqadaha, qaybta kaalmada adeegyada, waxay idiin hayaan adeeg kharaaleksander kelly . So [facility] [phone].    ATENCIÓN: Si habla español, tiene a lo disposición servicios gratuitos de asistencia lingüística. Llame al [phone].    We comply with applicable federal civil rights laws and [place] laws. We do not discriminate on the basis of race, color, national origin, age, disability, sex, sexual orientation, or gender identity.            Thank you!     Thank you for choosing [facility] DERMATOLOGIC SURGERY  for your care. Our goal is always to provide you with excellent care. Hearing back from our patients is one way we can continue to improve our services. Please take a few minutes to complete the written survey that you may receive in the mail after your visit with us. Thank you!             Your Updated Medication List - Protect others around you: Learn how to safely use, store and throw away your medicines at www.disposemymeds.org.           This list is accurate as of 11/26/18  1:50 PM.  Always use your most recent med list.                   Brand Name Dispense Instructions for use Diagnosis    amLODIPine 5 MG tablet    NORVASC    180 tablet    TAKE TWO TABLETS BY MOUTH EVERY DAY    Hypertension goal BP (blood pressure) < 140/90       CENTRUM SILVER ULTRA MENS PO           clotrimazole 1 % cream    LOTRIMIN    30 g    Apply topically 2 times daily    Tinea corporis       Desloratadine-Pseudoephedrine 5-240 MG Tb24    CLARINEX-D 24 HOUR    30 tablet    Take 1 tablet by mouth daily as needed    Acute seasonal allergic rhinitis due to pollen       hydrocortisone 0.2 % cream    WESTCORT    60 g    Apply topically 2 times daily    Intertrigo       hydrocortisone 2.5 % ointment     30 g    Apply topically 2 times daily as directed    Intertrigo       LANsoprazole 30 MG DR capsule    PREVACID    90 capsule    Take 1 capsule (30 mg) by mouth daily    Gastroesophageal reflux disease without esophagitis       metoprolol succinate 100 MG 24 hr tablet    TOPROL-XL    90 tablet    Take 1 tablet (100 mg) by mouth daily    Hypertension goal BP (blood pressure) < 140/90       nystatin cream    MYCOSTATIN    60 g    Apply topically 3 times daily    Intertrigo       ranitidine 300 MG tablet    ZANTAC    90 tablet    TAKE ONE TABLET BY MOUTH EVERY NIGHT AT BEDTIME    Gastroesophageal reflux disease without esophagitis       * sildenafil 100 MG tablet    VIAGRA    6 tablet    Take 0.5-1 tablets ( mg) by mouth daily as needed for erectile dysfunction Take 30 min to 4 hours before intercourse.  Never use with nitroglycerin, terazosin or doxazosin.    ED (erectile dysfunction)       * sildenafil 100 MG tablet    VIAGRA    6 tablet    Take 0.5-1 tablets ( mg) by mouth daily as needed for erectile dysfunction Take 30 min to 4 hours before intercourse.  Never use with nitroglycerin, terazosin or doxazosin.    Erectile dysfunction due to arterial insufficiency        vitamin D3 1000 UNIT tablet    CHOLECALCIFEROL     Take by mouth daily        * Notice:  This list has 2 medication(s) that are the same as other medications prescribed for you. Read the directions carefully, and ask your doctor or other care provider to review them with you.

## 2018-11-28 DIAGNOSIS — G47.33 OBSTRUCTIVE SLEEP APNEA (ADULT) (PEDIATRIC): Primary | ICD-10-CM

## 2018-12-18 ENCOUNTER — OFFICE VISIT (OUTPATIENT)
Dept: SLEEP MEDICINE | Facility: CLINIC | Age: 68
End: 2018-12-18
Payer: COMMERCIAL

## 2018-12-18 VITALS
HEIGHT: 75 IN | WEIGHT: 292 LBS | SYSTOLIC BLOOD PRESSURE: 129 MMHG | BODY MASS INDEX: 36.31 KG/M2 | HEART RATE: 81 BPM | DIASTOLIC BLOOD PRESSURE: 79 MMHG | RESPIRATION RATE: 18 BRPM | OXYGEN SATURATION: 96 %

## 2018-12-18 DIAGNOSIS — G47.33 OSA ON CPAP: Primary | ICD-10-CM

## 2018-12-18 PROCEDURE — 99214 OFFICE O/P EST MOD 30 MIN: CPT | Performed by: INTERNAL MEDICINE

## 2018-12-18 ASSESSMENT — MIFFLIN-ST. JEOR: SCORE: 2190.13

## 2018-12-18 NOTE — PATIENT INSTRUCTIONS
Your BMI is Body mass index is 36.5 kg/m .  Weight management is a personal decision.  If you are interested in exploring weight loss strategies, the following discussion covers the approaches that may be successful. Body mass index (BMI) is one way to tell whether you are at a healthy weight, overweight, or obese. It measures your weight in relation to your height.  A BMI of 18.5 to 24.9 is in the healthy range. A person with a BMI of 25 to 29.9 is considered overweight, and someone with a BMI of 30 or greater is considered obese. More than two-thirds of American adults are considered overweight or obese.  Being overweight or obese increases the risk for further weight gain. Excess weight may lead to heart disease and diabetes.  Creating and following plans for healthy eating and physical activity may help you improve your health.  Weight control is part of healthy lifestyle and includes exercise, emotional health, and healthy eating habits. Careful eating habits lifelong are the mainstay of weight control. Though there are significant health benefits from weight loss, long-term weight loss with diet alone may be very difficult to achieve- studies show long-term success with dietary management in less than 10% of people. Attaining a healthy weight may be especially difficult to achieve in those with severe obesity. In some cases, medications, devices and surgical management might be considered.  What can you do?  If you are overweight or obese and are interested in methods for weight loss, you should discuss this with your provider.     Consider reducing daily calorie intake by 500 calories.     Keep a food journal.     Avoiding skipping meals, consider cutting portions instead.    Diet combined with exercise helps maintain muscle while optimizing fat loss. Strength training is particularly important for building and maintaining muscle mass. Exercise helps reduce stress, increase energy, and improves fitness.  Increasing exercise without diet control, however, may not burn enough calories to loose weight.       Start walking three days a week 10-20 minutes at a time    Work towards walking thirty minutes five days a week     Eventually, increase the speed of your walking for 1-2 minutes at time    In addition, we recommend that you review healthy lifestyles and methods for weight loss available through the National Institutes of Health patient information sites:  http://win.niddk.nih.gov/publications/index.htm    And look into health and wellness programs that may be available through your health insurance provider, employer, local community center, or [employer].    Weight management plan: Patient was referred to their PCP to discuss a diet and exercise plan.

## 2018-12-18 NOTE — PROGRESS NOTES
"Sleep Follow-Up Visit:     Date on this visit: 12/18/2018     Juan Jose Razo is a 68 year old male with PMH HTN, GERD, and severe BUCK comes in today for follow-up. HST was performed on 10/12/17 with an AHI of 32.7/hr.     He reports using the CPAP regularly during sleep and uses a fullface mask. He reports good sleep quality with the CPAP use. He and his wife sleep in separate rooms so  he cannot report about snoring with device,  but he denies awakenings due to gasping for air or choking with the CPAP machine.  He denies fatigue or excessive daytime sleepiness.  He denies  drowsiness while driving.  His [place] sleepiness score is 5/24.  He recently had a Moh's procedure on his right side of his nose and has been experiencing slight restriction in that nostril(he discussed about that with his provider and was was informed that it will slowly resolve).  He  just got the mask replaced 2 weeks ago and since then the air leaks have reduced.    Downloadable compliance data:DOWNLOADABLE COMPLIANCE DATA:   For the past 1 month was reviewed. He used the device for 30 out of 30 days with an average use of 8 hours and 48 minutes  on the days used. 95th percentile on leak was 37.8 L/min. Residual AHI was 3.0 per hour. Median pressure settings: 12.9; 95th percentile : 15.7 and max pressure: 16.8 cms of water    Overnight oximetry obtained with the CPAP in November 2017 did not show evidence of sleep associated hypoxemia(time spent with oxygen saturations at or below 88% was 0 minutes)    Current meds, Past medical history, Past surgical history, Allergies, Social history, Family history: reviewed, per EMR    Exam:  Vital signs:/79   Pulse 81   Resp 18   Ht 1.905 m (6' 3\")   Wt 132.5 kg (292 lb)   SpO2 96%   BMI 36.50 kg/m    General appearance: Pt is dressed casually, cooperative with good eye contact, in no apparent distress  Cardiovascular: Regular S1,S2 ; no gallops or murmurs  Respiratory: clear to " "auscultation bilaterally, with no rales or rhonchi  Speech is spontaneous with regular rate and volume.   Mood: euthymic; affect congruent with full range and intensity.   Sensorium: awake, alert and oriented to person, place, time, and situation.    Assessment/plan:  Severe obstructive sleep apnea: Patient reports good compliance with CPAP device and reports positive benefits with device usage and based on the compliance measures, BUCK is adequately controlled with the CPAP at the current pressure settings.  Patient denies any symptoms of air hunger and was comfortable  with the current minimum pressure setting at 10 cm  water,  but based on the compliance measures, I have recommended increasing the maximum pressure setting to 18 cm water, while the minimum pressure will remain the same at 10 cm water.  Prescription for renewal of all the CPAP supplies was provided.  Recommended him  to continue using the CPAP regularly during sleep and getting the supplies for the CPAP replaced regularly.    We discussed weight management with diet and exercise.  Patient was counseled on avoiding driving/operating heavy machinery, if drowsy or sleepy.    F/U in 1 year or  sooner if there are any concerns.     The above note was dictated using voice recognition software. Although reviewed after completion, some word and grammatical error may remain . Please contact the author for any clarifications.    \"I spent a total of 25 minutes face to face with Juan Jose Razo during today's office visit. Over 50% of this time was spent counseling the patient and  coordinating care regarding sleep apnea, CPAP treatment, weight management.\"       Ernesto Blankenship MD   of Medicine,  Division of Pulmonary/Sleep Medicine  [facility].            "

## 2019-01-28 ENCOUNTER — ALLIED HEALTH/NURSE VISIT (OUTPATIENT)
Dept: DERMATOLOGY | Facility: CLINIC | Age: 69
End: 2019-01-28
Payer: COMMERCIAL

## 2019-01-28 DIAGNOSIS — C44.321: Primary | ICD-10-CM

## 2019-01-28 ASSESSMENT — PAIN SCALES - GENERAL: PAINLEVEL: NO PAIN (0)

## 2019-01-28 NOTE — NURSING NOTE
Chief Complaint   Patient presents with     Derm Problem     Juan Jose is here today for follow up on MOHS procedure on his right nasal ala     Ly Rodgers LPN

## 2019-01-30 NOTE — NURSING NOTE
Juan Jose Razo comes into clinic today at the request of Dr. Barba Ordering Provider for Scar Check.    This service provided today was under the supervising provider of the day Dr. Barba, who was available if needed.    Ly Rodgers LPN     [id number]

## 2019-02-02 NOTE — MR AVS SNAPSHOT
After Visit Summary   6/26/2017    Juan Jose Razo    MRN: [id number]           Patient Information     Date Of Birth          1950        Visit Information        Provider Department      6/26/2017 2:00 PM Alphonso Grover MD [facility] [place]        Today's Diagnoses     Hypertension goal BP (blood pressure) < 140/90    -  1    Routine general medical examination at a health care facility        Need for hepatitis C screening test        CARDIOVASCULAR SCREENING; LDL GOAL LESS THAN 130        Erectile dysfunction due to arterial insufficiency        Tinea corporis        Gastroesophageal reflux disease without esophagitis          Care Instructions      Preventive Health Recommendations  Male Ages 50 - 64    Yearly exam:             See your health care provider every year in order to  o   Review health changes.   o   Discuss preventive care.    o   Review your medicines if your doctor has prescribed any.     Have a cholesterol test every 5 years, or more frequently if you are at risk for high cholesterol/heart disease.     Have a diabetes test (fasting glucose) every three years. If you are at risk for diabetes, you should have this test more often.     Have a colonoscopy at age 50, or have a yearly FIT test (stool test). These exams will check for colon cancer.      Talk with your health care provider about whether or not a prostate cancer screening test (PSA) is right for you.    You should be tested each year for STDs (sexually transmitted diseases), if you re at risk.     Shots: Get a flu shot each year. Get a tetanus shot every 10 years.     Nutrition:    Eat at least 5 servings of fruits and vegetables daily.     Eat whole-grain bread, whole-wheat pasta and brown rice instead of white grains and rice.     Talk to your provider about Calcium and Vitamin D.     Lifestyle    Exercise for at least 150 minutes a week (30 minutes a day, 5 days a week). This will help you  control your weight and prevent disease.     Limit alcohol to one drink per day.     No smoking.     Wear sunscreen to prevent skin cancer.     See your dentist every six months for an exam and cleaning.     See your eye doctor every 1 to 2 years.            Follow-ups after your visit        Future tests that were ordered for you today     Open Future Orders        Priority Expected Expires Ordered    TDAP VACCINE (BOOSTRIX) Routine  6/26/2018 6/26/2017            Who to contact     If you have questions or need follow up information about today's clinic visit or your schedule please contact [facility] directly at [phone].  Normal or non-critical lab and imaging results will be communicated to you by [employer], letter or phone within 4 business days after the clinic has received the results. If you do not hear from us within 7 days, please contact the clinic through [employer] or phone. If you have a critical or abnormal lab result, we will notify you by phone as soon as possible.  Submit refill requests through [employer] or call your pharmacy and they will forward the refill request to us. Please allow 3 business days for your refill to be completed.          Additional Information About Your Visit        MyChart Information     [employer] gives you secure access to your electronic health record. If you see a primary care provider, you can also send messages to your care team and make appointments. If you have questions, please call your primary care clinic.  If you do not have a primary care provider, please call [phone] and they will assist you.        Care EveryWhere ID     This is your Care EveryWhere ID. This could be used by other organizations to access your [place] medical records  [id number]        Your Vitals Were     Pulse Temperature Pulse Oximetry BMI (Body Mass Index)          77 98.1  F (36.7  C) (Oral) 96% 34.22 kg/m2         Blood Pressure from Last 3 Encounters:   06/26/17  120/88   02/09/17 121/83   09/02/16 130/82    Weight from Last 3 Encounters:   06/26/17 273 lb 12.8 oz (124.2 kg)   09/02/16 281 lb (127.5 kg)   08/28/16 276 lb (125.2 kg)              We Performed the Following     Albumin Random Urine Quantitative     Comprehensive metabolic panel (BMP + Alb, Alk Phos, ALT, AST, Total. Bili, TP)     Hepatitis C Screen Reflex to HCV RNA Quant and Genotype     Lipid Profile (Chol, Trig, HDL, LDL calc)          Today's Medication Changes          These changes are accurate as of: 6/26/17  3:54 PM.  If you have any questions, ask your nurse or doctor.               These medicines have changed or have updated prescriptions.        Dose/Directions    amLODIPine 5 MG tablet   Commonly known as:  NORVASC   This may have changed:  additional instructions   Used for:  Hypertension goal BP (blood pressure) < 140/90   Changed by:  Alphonso Grover MD        Dose:  10 mg   Take 2 tablets (10 mg) by mouth daily   Quantity:  180 tablet   Refills:  3       metoprolol 100 MG 24 hr tablet   Commonly known as:  TOPROL-XL   This may have changed:  additional instructions   Used for:  Hypertension goal BP (blood pressure) < 140/90   Changed by:  Alphonso Grover MD        Dose:  100 mg   Take 1 tablet (100 mg) by mouth daily   Quantity:  90 tablet   Refills:  3       * sildenafil 100 MG cap/tab   Commonly known as:  VIAGRA   This may have changed:  Another medication with the same name was added. Make sure you understand how and when to take each.   Used for:  ED (erectile dysfunction)   Changed by:  Alphonso Grover MD        Dose:   mg   Take 0.5-1 tablets ( mg) by mouth daily as needed for erectile dysfunction Take 30 min to 4 hours before intercourse.  Never use with nitroglycerin, terazosin or doxazosin.   Quantity:  6 tablet   Refills:  3       * sildenafil 100 MG cap/tab   Commonly known as:  VIAGRA   This may have changed:  You were already taking a  medication with the same name, and this prescription was added. Make sure you understand how and when to take each.   Used for:  Erectile dysfunction due to arterial insufficiency   Changed by:  Alphonso Grover MD        Dose:   mg   Take 0.5-1 tablets ( mg) by mouth daily as needed for erectile dysfunction Take 30 min to 4 hours before intercourse.  Never use with nitroglycerin, terazosin or doxazosin.   Quantity:  6 tablet   Refills:  1       * Notice:  This list has 2 medication(s) that are the same as other medications prescribed for you. Read the directions carefully, and ask your doctor or other care provider to review them with you.         Where to get your medicines      These medications were sent to [facility] [address] [place], [place] [address]., [address] [address]     Phone:  [phone]     amLODIPine 5 MG tablet    clotrimazole 1 % cream    LANsoprazole 30 MG CR capsule    metoprolol 100 MG 24 hr tablet    ranitidine 300 MG tablet         Some of these will need a paper prescription and others can be bought over the counter.  Ask your nurse if you have questions.     Bring a paper prescription for each of these medications     sildenafil 100 MG cap/tab                Primary Care Provider Office Phone # Fax #    Alphonso Grover [phone]       [facility] [address]  [facility] [address]        Equal Access to Services     RENEA [facility] CenterJENNIFER AH: Hadii dawn sánchez hadasho Sochauncey, waaxda luqadaha, qaybta kaalmada adeegyada, jumana ruiz. So [facility] [phone].    ATENCIÓN: Si habla español, tiene a lo disposición servicios gratuitos de asistencia lingüística. Llame al [phone].    We comply with applicable federal civil rights laws and [place] laws. We do not discriminate on the basis of race, color, national origin, age, disability sex, sexual  orientation or gender identity.            Thank you!     Thank you for [place] [facility]  [place] your [place]. Our goal is always to provide you with excellent care. Hearing back from our patients is one way we can continue to improve our services. Please take a few minutes to complete the written survey that you may receive in the mail after your visit with us. Thank you!             Your Updated Medication List - Protect others around you: Learn how to safely use, store and throw away your medicines at www.disposemymeds.org.          This list is accurate as of: 6/26/17  3:54 PM.  Always use your most recent med list.                   Brand Name Dispense Instructions for use Diagnosis    amLODIPine 5 MG tablet    NORVASC    180 tablet    Take 2 tablets (10 mg) by mouth daily    Hypertension goal BP (blood pressure) < 140/90       CENTRUM SILVER ULTRA MENS PO           cholecalciferol 1000 UNIT tablet    vitamin D     Take by mouth daily        clotrimazole 1 % cream    LOTRIMIN    30 g    Apply topically 2 times daily    Tinea corporis       Desloratadine-Pseudoephedrine 5-240 MG Tb24    CLARINEX-D 24 HOUR    30 tablet    Take 1 tablet by mouth daily as needed    Allergic rhinitis, cause unspecified       hydrocortisone 0.2 % cream    WESTCORT    60 g    Apply topically 2 times daily    Intertrigo       hydrocortisone 2.5 % ointment     30 g    Apply topically 2 times daily    Tinea corporis       LANsoprazole 30 MG CR capsule    PREVACID    90 capsule    Take 1 capsule (30 mg) by mouth daily    Gastroesophageal reflux disease without esophagitis       metoprolol 100 MG 24 hr tablet    TOPROL-XL    90 tablet    Take 1 tablet (100 mg) by mouth daily    Hypertension goal BP (blood pressure) < 140/90       ranitidine 300 MG tablet    ZANTAC    90 tablet    Take 1 tablet (300 mg) by mouth At Bedtime    Gastroesophageal reflux disease without esophagitis       * sildenafil 100 MG cap/tab    VIAGRA    6  Patient with anemia in the setting of ESRD. Hemoglobin below target range. Pt. on MOOKIE treatment with HD. Monitor hemoglobin tablet    Take 0.5-1 tablets ( mg) by mouth daily as needed for erectile dysfunction Take 30 min to 4 hours before intercourse.  Never use with nitroglycerin, terazosin or doxazosin.    ED (erectile dysfunction)       * sildenafil 100 MG cap/tab    VIAGRA    6 tablet    Take 0.5-1 tablets ( mg) by mouth daily as needed for erectile dysfunction Take 30 min to 4 hours before intercourse.  Never use with nitroglycerin, terazosin or doxazosin.    Erectile dysfunction due to arterial insufficiency       * Notice:  This list has 2 medication(s) that are the same as other medications prescribed for you. Read the directions carefully, and ask your doctor or other care provider to review them with you.

## 2019-03-20 ENCOUNTER — OFFICE VISIT (OUTPATIENT)
Dept: DERMATOLOGY | Facility: CLINIC | Age: 69
End: 2019-03-20
Payer: COMMERCIAL

## 2019-03-20 DIAGNOSIS — Z85.89 HISTORY OF SQUAMOUS CELL CARCINOMA: ICD-10-CM

## 2019-03-20 DIAGNOSIS — L82.1 SEBORRHEIC KERATOSIS: ICD-10-CM

## 2019-03-20 DIAGNOSIS — L82.0 INFLAMED SEBORRHEIC KERATOSIS: ICD-10-CM

## 2019-03-20 DIAGNOSIS — L81.4 SOLAR LENTIGINOSIS: ICD-10-CM

## 2019-03-20 DIAGNOSIS — D22.9 MULTIPLE BENIGN NEVI: ICD-10-CM

## 2019-03-20 DIAGNOSIS — Z12.83 SKIN CANCER SCREENING: Primary | ICD-10-CM

## 2019-03-20 ASSESSMENT — PAIN SCALES - GENERAL: PAINLEVEL: NO PAIN (0)
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
## 2019-03-20 NOTE — PATIENT INSTRUCTIONS
Cryotherapy    What is it?    Use of a very cold liquid, such as liquid nitrogen, to freeze and destroy abnormal skin cells that need to be removed    What should I expect?    Tenderness and redness    A small blister that might grow and fill with dark purple blood. There may be crusting.    More than one treatment may be needed if the lesions do not go away.    How do I care for the treated area?    Gently wash the area with your hands when bathing.    Use a thin layer of Vaseline to help with healing. You may use a Band-Aid.     The area should heal within 7-10 days and may leave behind a pink or lighter color.     Do not use an antibiotic or Neosporin ointment.     You may take acetaminophen (Tylenol) for pain.     Call your Doctor if you have:    Severe pain    Signs of infection (warmth, redness, cloudy yellow drainage, and or a bad smell)    Questions or concerns    Who should I call with questions?       [facility]: [phone]       [facility]: [phone]       For urgent needs outside of business hours call the [facility] at [phone]        and ask for the dermatology resident on call

## 2019-03-20 NOTE — NURSING NOTE
Dermatology Rooming Note    Juan Jose Razo's goals for this visit include:   Chief Complaint   Patient presents with     Skin Check     Juan Jose is here today for a skin check- Juan Jose notes an area of concern on his temple.      TIM Burks
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
## 2019-03-22 ENCOUNTER — TELEPHONE (OUTPATIENT)
Dept: OTHER | Facility: CLINIC | Age: 69
End: 2019-03-22

## 2019-07-01 DIAGNOSIS — I10 HYPERTENSION GOAL BP (BLOOD PRESSURE) < 140/90: ICD-10-CM

## 2019-07-01 RX ORDER — AMLODIPINE BESYLATE 5 MG/1
TABLET ORAL
Qty: 0.1 TABLET | Refills: 0 | OUTPATIENT
Start: 2019-07-01

## 2019-07-01 NOTE — TELEPHONE ENCOUNTER
"Requested Prescriptions   Pending Prescriptions Disp Refills     amLODIPine (NORVASC) 5 MG tablet [Pharmacy Med Name: AMLODIPINE BESYLATE 5MG TABS] 180 tablet 3     Sig: TAKE TWO TABLETS BY MOUTH EVERY DAY  Last Written Prescription Date:  10/09/2018  Last Fill Quantity: 180,  # refills: 3   Last office visit: 10/9/2018 with prescribing provider:  10/09/2018   Future Office Visit:         Calcium Channel Blockers Protocol  Passed - 7/1/2019  7:43 AM        Passed - Blood pressure under 140/90 in past 12 months     BP Readings from Last 3 Encounters:   12/18/18 129/79   10/09/18 136/87   09/24/18 135/88                 Passed - Recent (12 mo) or future (30 days) visit within the authorizing provider's specialty     Patient had office visit in the last 12 months or has a visit in the next 30 days with authorizing provider or within the authorizing provider's specialty.  See \"Patient Info\" tab in inbasket, or \"Choose Columns\" in Meds & Orders section of the refill encounter.              Passed - Medication is active on med list        Passed - Patient is age 18 or older        Passed - Normal serum creatinine on file in past 12 months     Recent Labs   Lab Test 10/09/18  1033   CR 0.96             "

## 2019-07-01 NOTE — TELEPHONE ENCOUNTER
Refused Prescriptions:                       Disp   Refills    amLODIPine (NORVASC) 5 MG tablet [Pharmacy*0.1 ta*0        Sig: TAKE TWO TABLETS BY MOUTH EVERY DAY  Refused By: SAMEER HOSKINS  Reason for Refusal: Duplicate

## 2019-09-29 DIAGNOSIS — I10 HYPERTENSION GOAL BP (BLOOD PRESSURE) < 140/90: ICD-10-CM

## 2019-09-30 ENCOUNTER — HEALTH MAINTENANCE LETTER (OUTPATIENT)
Age: 69
End: 2019-09-30

## 2019-09-30 NOTE — TELEPHONE ENCOUNTER
"Requested Prescriptions   Pending Prescriptions Disp Refills     metoprolol succinate ER (TOPROL-XL) 100 MG 24 hr tablet [Pharmacy Med Name: METOPROLOL SUCCINATE ER 100MG TB24] 90 tablet 3     Sig: TAKE ONE TABLET BY MOUTH EVERY DAY  Last Written Prescription Date:  10/09/2018  Last Fill Quantity: 90,  # refills: 3   Last office visit: 10/9/2018 with prescribing provider:  10/09/2018   Future Office Visit:         Beta-Blockers Protocol Passed - 9/29/2019  9:41 AM        Passed - Blood pressure under 140/90 in past 12 months     BP Readings from Last 3 Encounters:   12/18/18 129/79   10/09/18 136/87   09/24/18 135/88                 Passed - Patient is age 6 or older        Passed - Recent (12 mo) or future (30 days) visit within the authorizing provider's specialty     Patient has had an office visit with the authorizing provider or a provider within the authorizing providers department within the previous 12 mos or has a future within next 30 days. See \"Patient Info\" tab in inbasket, or \"Choose Columns\" in Meds & Orders section of the refill encounter.              Passed - Medication is active on med list        "

## 2019-10-01 ENCOUNTER — OFFICE VISIT (OUTPATIENT)
Dept: DERMATOLOGY | Facility: CLINIC | Age: 69
End: 2019-10-01
Payer: COMMERCIAL

## 2019-10-01 DIAGNOSIS — D48.9 NEOPLASM OF UNCERTAIN BEHAVIOR: Primary | ICD-10-CM

## 2019-10-01 RX ORDER — METOPROLOL SUCCINATE 100 MG/1
TABLET, EXTENDED RELEASE ORAL
Qty: 30 TABLET | Refills: 0 | Status: SHIPPED | OUTPATIENT
Start: 2019-10-01 | End: 2019-10-24

## 2019-10-01 ASSESSMENT — PAIN SCALES - GENERAL: PAINLEVEL: NO PAIN (1)
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
## 2019-10-01 NOTE — TELEPHONE ENCOUNTER
Medication is being filled for 1 time refill only due to:  Patient needs to be seen because due for physical this month.
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
## 2019-10-01 NOTE — NURSING NOTE
Dermatology Rooming Note    Juan Jose Razo's goals for this visit include:   Chief Complaint   Patient presents with     Derm Problem     Juan Jose is here for possible fungus on finger.     Cyndee Darden, CMA

## 2019-10-04 NOTE — TELEPHONE ENCOUNTER
FUTURE VISIT INFORMATION      FUTURE VISIT INFORMATION:    Date: 10.8.19    Time: 3:00    Location:  DermSurg  REFERRAL INFORMATION:    Referring provider:  Edith Herr    Referring providers clinic:  [facility]    Reason for visit/diagnosis:  biopsy consult, left 2nd finger    RECORDS REQUESTED FROM:       Clinic name Comments Records Status Photos  Status   [facility] 10.1.19 Edith Herr Epic Epic

## 2019-10-08 ENCOUNTER — OFFICE VISIT (OUTPATIENT)
Dept: DERMATOLOGY | Facility: CLINIC | Age: 69
End: 2019-10-08
Payer: COMMERCIAL

## 2019-10-08 ENCOUNTER — PRE VISIT (OUTPATIENT)
Dept: DERMATOLOGY | Facility: CLINIC | Age: 69
End: 2019-10-08

## 2019-10-08 DIAGNOSIS — D48.9 NEOPLASM OF UNCERTAIN BEHAVIOR: Primary | ICD-10-CM

## 2019-10-08 ASSESSMENT — PAIN SCALES - GENERAL: PAINLEVEL: NO PAIN (1)

## 2019-10-08 NOTE — NURSING NOTE
Chief Complaint   Patient presents with     Derm Problem     Juan Jose is here today to talk about having an extra finger nail removed from his left hand second digit.      Candie Gutierrez, CMA
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
## 2019-10-21 ASSESSMENT — ENCOUNTER SYMPTOMS
NAUSEA: 0
FREQUENCY: 0
NERVOUS/ANXIOUS: 0
HEARTBURN: 0
PALPITATIONS: 0
MYALGIAS: 0
CHILLS: 0
ABDOMINAL PAIN: 0
CONSTIPATION: 0
DIARRHEA: 0
HEMATOCHEZIA: 0
HEADACHES: 0
SHORTNESS OF BREATH: 0
COUGH: 0
PARESTHESIAS: 0
DIZZINESS: 0
DYSURIA: 0
SORE THROAT: 0
ARTHRALGIAS: 0
WEAKNESS: 0
JOINT SWELLING: 0
EYE PAIN: 0
FEVER: 0
HEMATURIA: 0

## 2019-10-21 ASSESSMENT — ACTIVITIES OF DAILY LIVING (ADL): CURRENT_FUNCTION: NO ASSISTANCE NEEDED

## 2019-10-23 NOTE — PATIENT INSTRUCTIONS
Patient Education   Personalized Prevention Plan  You are due for the preventive services outlined below.  Your care team is available to assist you in scheduling these services.  If you have already completed any of these items, please share that information with your care team to update in your medical record.  Health Maintenance Due   Topic Date Due     AORTIC ANEURYSM SCREENING (SYSTEM ASSIGNED)  12/17/2017     FALL RISK ASSESSMENT  10/09/2019     Annual Wellness Visit  10/09/2019     Pneumococcal Vaccine (2 of 2 - PPSV23) 10/09/2019

## 2019-10-24 ENCOUNTER — OFFICE VISIT (OUTPATIENT)
Dept: FAMILY MEDICINE | Facility: CLINIC | Age: 69
End: 2019-10-24
Payer: COMMERCIAL

## 2019-10-24 VITALS
HEIGHT: 75 IN | HEART RATE: 84 BPM | RESPIRATION RATE: 16 BRPM | WEIGHT: 292.2 LBS | BODY MASS INDEX: 36.33 KG/M2 | DIASTOLIC BLOOD PRESSURE: 78 MMHG | SYSTOLIC BLOOD PRESSURE: 132 MMHG | OXYGEN SATURATION: 94 % | TEMPERATURE: 97.8 F

## 2019-10-24 DIAGNOSIS — K21.9 GASTROESOPHAGEAL REFLUX DISEASE WITHOUT ESOPHAGITIS: ICD-10-CM

## 2019-10-24 DIAGNOSIS — Z23 NEED FOR PNEUMOCOCCAL VACCINATION: ICD-10-CM

## 2019-10-24 DIAGNOSIS — I10 HYPERTENSION GOAL BP (BLOOD PRESSURE) < 140/90: ICD-10-CM

## 2019-10-24 DIAGNOSIS — E66.01 MORBID OBESITY (H): ICD-10-CM

## 2019-10-24 DIAGNOSIS — Z00.00 ENCOUNTER FOR MEDICARE ANNUAL WELLNESS EXAM: Primary | ICD-10-CM

## 2019-10-24 LAB
ALBUMIN SERPL-MCNC: 4.1 G/DL (ref 3.4–5)
ALP SERPL-CCNC: 85 U/L (ref 40–150)
ALT SERPL W P-5'-P-CCNC: 69 U/L (ref 0–70)
ANION GAP SERPL CALCULATED.3IONS-SCNC: 7 MMOL/L (ref 3–14)
AST SERPL W P-5'-P-CCNC: 57 U/L (ref 0–45)
BILIRUB SERPL-MCNC: 1 MG/DL (ref 0.2–1.3)
BUN SERPL-MCNC: 14 MG/DL (ref 7–30)
CALCIUM SERPL-MCNC: 8.8 MG/DL (ref 8.5–10.1)
CHLORIDE SERPL-SCNC: 104 MMOL/L (ref 94–109)
CHOLEST SERPL-MCNC: 164 MG/DL
CO2 SERPL-SCNC: 26 MMOL/L (ref 20–32)
CREAT SERPL-MCNC: 0.94 MG/DL (ref 0.66–1.25)
CREAT UR-MCNC: 113 MG/DL
GFR SERPL CREATININE-BSD FRML MDRD: 84 ML/MIN/{1.73_M2}
GLUCOSE SERPL-MCNC: 102 MG/DL (ref 70–99)
HDLC SERPL-MCNC: 34 MG/DL
LDLC SERPL CALC-MCNC: 87 MG/DL
MICROALBUMIN UR-MCNC: 10 MG/L
MICROALBUMIN/CREAT UR: 8.94 MG/G CR (ref 0–17)
NONHDLC SERPL-MCNC: 130 MG/DL
POTASSIUM SERPL-SCNC: 4.1 MMOL/L (ref 3.4–5.3)
PROT SERPL-MCNC: 8.6 G/DL (ref 6.8–8.8)
SODIUM SERPL-SCNC: 137 MMOL/L (ref 133–144)
TRIGL SERPL-MCNC: 214 MG/DL

## 2019-10-24 PROCEDURE — 80053 COMPREHEN METABOLIC PANEL: CPT | Performed by: FAMILY MEDICINE

## 2019-10-24 PROCEDURE — 99397 PER PM REEVAL EST PAT 65+ YR: CPT | Mod: 25 | Performed by: FAMILY MEDICINE

## 2019-10-24 PROCEDURE — 36415 COLL VENOUS BLD VENIPUNCTURE: CPT | Performed by: FAMILY MEDICINE

## 2019-10-24 PROCEDURE — G0009 ADMIN PNEUMOCOCCAL VACCINE: HCPCS | Performed by: FAMILY MEDICINE

## 2019-10-24 PROCEDURE — 90732 PPSV23 VACC 2 YRS+ SUBQ/IM: CPT | Performed by: FAMILY MEDICINE

## 2019-10-24 PROCEDURE — 80061 LIPID PANEL: CPT | Performed by: FAMILY MEDICINE

## 2019-10-24 PROCEDURE — 82043 UR ALBUMIN QUANTITATIVE: CPT | Performed by: FAMILY MEDICINE

## 2019-10-24 RX ORDER — LANSOPRAZOLE 30 MG/1
30 CAPSULE, DELAYED RELEASE ORAL DAILY
Qty: 90 CAPSULE | Refills: 3 | Status: SHIPPED | OUTPATIENT
Start: 2019-10-24 | End: 2020-10-26

## 2019-10-24 RX ORDER — METOPROLOL SUCCINATE 100 MG/1
100 TABLET, EXTENDED RELEASE ORAL DAILY
Qty: 90 TABLET | Refills: 3 | Status: SHIPPED | OUTPATIENT
Start: 2019-10-24 | End: 2020-10-21

## 2019-10-24 RX ORDER — AMLODIPINE BESYLATE 5 MG/1
TABLET ORAL
Qty: 180 TABLET | Refills: 3 | Status: SHIPPED | OUTPATIENT
Start: 2019-10-24 | End: 2020-01-07

## 2019-10-24 ASSESSMENT — MIFFLIN-ST. JEOR: SCORE: 2191.04

## 2019-10-24 NOTE — PROGRESS NOTES
SUBJECTIVE:   Juan Jose Razo is a 68 year old male who presents for Preventive Visit.      Are you in the first 12 months of your Medicare coverage?  No    HPI  Do you feel safe in your environment? Yes    Do you have a Health Care Directive? No: Advance care planning was reviewed with patient; patient declined at this time.      Fall risk     click delete button to remove this line now  Cognitive Screening   1) Repeat 3 items (Leader, Season, Table)    2) Clock draw:   3) 3 item recall: Recalls 3 objects  Results: 3 items recalled: COGNITIVE IMPAIRMENT LESS LIKELY    Mini-CogTM Copyright [employer] [place]. Licensed by the author for use in [facility]; reprinted with permission (jhoan@[email or web address]). All rights reserved.      Do you have sleep apnea, excessive snoring or daytime drowsiness?: no    Reviewed and updated as needed this visit by clinical staff  Tobacco  Allergies  Meds  Med Hx  Surg Hx  Fam Hx  Soc Hx        Reviewed and updated as needed this visit by Provider        Social History     Tobacco Use     Smoking status: Never Smoker     Smokeless tobacco: Never Used   Substance Use Topics     Alcohol use: Yes     Comment: 1-2 beers a week      If you drink alcohol do you typically have >3 drinks per day or >7 drinks per week? No    Alcohol Use 10/21/2019   Prescreen: >3 drinks/day or >7 drinks/week? No   Prescreen: >3 drinks/day or >7 drinks/week? -           Hyperlipidemia Follow-Up      Are you having any of the following symptoms? (Select all that apply)  No complaints of shortness of breath, chest pain or pressure.  No increased sweating or nausea with activity.  No left-sided neck or arm pain.  No complaints of pain in calves when walking 1-2 blocks.    Are you regularly taking any medication or supplement to lower your cholesterol?   Yes- statin    Are you having muscle aches or other side effects that you think could be caused by your cholesterol lowering medication?  No      Hypertension  "Follow-up      Do you check your blood pressure regularly outside of the clinic? Yes     Are you following a low salt diet? Yes    Are your blood pressures ever more than 140 on the top number (systolic) OR more   than 90 on the bottom number (diastolic), for example 140/90? No    Current providers sharing in care for this patient include:   Patient Care Team:  Alphonso Grover MD as PCP - General (Family Practice)  Imelda Rincon PA-C as Physician Assistant (Physician Assistant)  Servando Diane MD as MD (Gastroenterology)  Alphonso Grover MD as Assigned PCP    The following health maintenance items are reviewed in Epic and correct as of today:  Health Maintenance   Topic Date Due     AORTIC ANEURYSM SCREENING (SYSTEM ASSIGNED)  12/17/2017     FALL RISK ASSESSMENT  10/09/2019     MEDICARE ANNUAL WELLNESS VISIT  10/09/2019     PNEUMOCOCCAL IMMUNIZATION 65+ LOW/MEDIUM RISK (2 of 2 - PPSV23) 10/09/2019     ADVANCE CARE PLANNING  05/23/2023     LIPID  10/09/2023     COLONOSCOPY  03/18/2026     DTAP/TDAP/TD IMMUNIZATION (4 - Td) 06/26/2027     HEPATITIS C SCREENING  Completed     PHQ-2  Completed     INFLUENZA VACCINE  Completed     ZOSTER IMMUNIZATION  Completed     IPV IMMUNIZATION  Aged Out     MENINGITIS IMMUNIZATION  Aged Out     Lab work is in process  Pneumonia Vaccine:Adults age 65+ who received Pneumovax (PPSV23) at 65 years or older: Should be given PCV13 > 1 year after their most recent PPSV23    Review of Systems  Constitutional, HEENT, cardiovascular, pulmonary, gi and gu systems are negative, except as otherwise noted.    OBJECTIVE:   /78   Pulse 84   Temp 97.8  F (36.6  C) (Temporal)   Resp 16   Ht 1.905 m (6' 3\")   Wt 132.5 kg (292 lb 3.2 oz)   SpO2 94%   BMI 36.52 kg/m   Estimated body mass index is 36.52 kg/m  as calculated from the following:    Height as of this encounter: 1.905 m (6' 3\").    Weight as of this encounter: 132.5 kg (292 lb 3.2 oz).  Physical " Exam  GENERAL: healthy, alert and no distress  EYES: Eyes grossly normal to inspection, PERRL and conjunctivae and sclerae normal  HENT: ear canals and TM's normal, nose and mouth without ulcers or lesions  NECK: no adenopathy, no asymmetry, masses, or scars and thyroid normal to palpation  RESP: lungs clear to auscultation - no rales, rhonchi or wheezes  CV: regular rate and rhythm, normal S1 S2, no S3 or S4, no murmur, click or rub, no peripheral edema and peripheral pulses strong  ABDOMEN: soft, nontender, no hepatosplenomegaly, no masses and bowel sounds normal   (male): normal male genitalia without lesions or urethral discharge, no hernia  RECTAL (male): normal sphincter tone, no rectal masses and prostate 1-2+ enlarged, nontender  MS: no gross musculoskeletal defects noted, no edema  SKIN: no suspicious lesions or rashes  NEURO: Normal strength and tone, mentation intact and speech normal  BACK: no CVA tenderness, no paralumbar tenderness  PSYCH: mentation appears normal, affect normal/bright  LYMPH: no cervical, supraclavicular, axillary, or inguinal adenopathy    Diagnostic Test Results:  Labs reviewed in Epic    ASSESSMENT / PLAN:   1. Encounter for Medicare annual wellness exam  Doing well    2. Hypertension goal BP (blood pressure) < 140/90  Well controlled   - metoprolol succinate ER (TOPROL-XL) 100 MG 24 hr tablet; Take 1 tablet (100 mg) by mouth daily  Dispense: 90 tablet; Refill: 3  - amLODIPine (NORVASC) 5 MG tablet; TAKE TWO TABLETS BY MOUTH EVERY DAY  Dispense: 180 tablet; Refill: 3  - Comprehensive metabolic panel  - Albumin Random Urine Quantitative with Creat Ratio  - Lipid Profile    3. Gastroesophageal reflux disease without esophagitis  /Well controlled   - LANsoprazole (PREVACID) 30 MG DR capsule; Take 1 capsule (30 mg) by mouth daily  Dispense: 90 capsule; Refill: 3    4. Morbid obesity (H)  Working on diet/ exercise    5. Need for pneumococcal vaccination  done  - PNEUMOCOCCAL  "VACCINE,ADULT,SQ OR IM    End of Life Planning:  Patient currently has an advanced directive: No.  I have verified the patient's ablity to prepare an advanced directive/make health care decisions.  Literature was provided to assist patient in preparing an advanced directive.    COUNSELING:  Reviewed preventive health counseling, as reflected in patient instructions       Regular exercise       Healthy diet/nutrition       Vision screening       Hearing screening       Dental care       Bladder control       Fall risk prevention       Immunizations    Vaccinated for: Pneumococcal             Safe sex practices/STD prevention       Colon cancer screening       Prostate cancer screening declined    Estimated body mass index is 36.52 kg/m  as calculated from the following:    Height as of this encounter: 1.905 m (6' 3\").    Weight as of this encounter: 132.5 kg (292 lb 3.2 oz).    Weight management plan: Discussed healthy diet and exercise guidelines     reports that he has never smoked. He has never used smokeless tobacco.      Appropriate preventive services were discussed with this patient, including applicable screening as appropriate for cardiovascular disease, diabetes, osteopenia/osteoporosis, and glaucoma.  As appropriate for age/gender, discussed screening for colorectal cancer, prostate cancer, breast cancer, and cervical cancer. Checklist reviewing preventive services available has been given to the patient.    Reviewed patients plan of care and provided an AVS. The Basic Care Plan (routine screening as documented in Health Maintenance) for Juan Jose meets the Care Plan requirement. This Care Plan has been established and reviewed with the Patient.    Counseling Resources:  ATP IV Guidelines  Pooled Cohorts Equation Calculator  Breast Cancer Risk Calculator  FRAX Risk Assessment  ICSI Preventive Guidelines  Dietary Guidelines for Americans, 2010  USDA's MyPlate  ASA Prophylaxis  Lung CA Screening    Alphonso " Ceasar Grover MD  [facility]    Identified Health Risks:

## 2019-11-04 ENCOUNTER — TELEPHONE (OUTPATIENT)
Dept: DERMATOLOGY | Facility: CLINIC | Age: 69
End: 2019-11-04

## 2019-11-04 ENCOUNTER — OFFICE VISIT (OUTPATIENT)
Dept: DERMATOLOGY | Facility: CLINIC | Age: 69
End: 2019-11-04
Payer: COMMERCIAL

## 2019-11-04 DIAGNOSIS — Z85.89 HISTORY OF SQUAMOUS CELL CARCINOMA: ICD-10-CM

## 2019-11-04 DIAGNOSIS — Z12.83 SKIN CANCER SCREENING: Primary | ICD-10-CM

## 2019-11-04 DIAGNOSIS — L30.4 INTERTRIGO: ICD-10-CM

## 2019-11-04 DIAGNOSIS — D18.01 CHERRY ANGIOMA: ICD-10-CM

## 2019-11-04 DIAGNOSIS — L82.1 SEBORRHEIC KERATOSIS: ICD-10-CM

## 2019-11-04 RX ORDER — HYDROCORTISONE VALERATE CREAM 2 MG/G
CREAM TOPICAL 2 TIMES DAILY
Qty: 60 G | Refills: 1 | Status: SHIPPED | OUTPATIENT
Start: 2019-11-04 | End: 2020-10-26

## 2019-11-04 RX ORDER — TRIAMCINOLONE ACETONIDE 0.25 MG/G
OINTMENT TOPICAL 2 TIMES DAILY
Qty: 80 G | Refills: 3 | Status: SHIPPED | OUTPATIENT
Start: 2019-11-04 | End: 2020-12-21

## 2019-11-04 RX ORDER — NYSTATIN 100000 U/G
CREAM TOPICAL
Qty: 60 G | Refills: 1 | Status: SHIPPED | OUTPATIENT
Start: 2019-11-04 | End: 2020-12-21

## 2019-11-04 RX ORDER — HYDROCORTISONE VALERATE CREAM 2 MG/G
CREAM TOPICAL 2 TIMES DAILY
Qty: 60 G | Refills: 1 | Status: CANCELLED | OUTPATIENT
Start: 2019-11-04

## 2019-11-04 ASSESSMENT — PAIN SCALES - GENERAL: PAINLEVEL: NO PAIN (0)
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
## 2019-11-04 NOTE — NURSING NOTE
Chief Complaint   Patient presents with     Skin Check     Juan Jose is here today for a skin check. No concerns.      Wendy Sheth LPN

## 2019-11-14 ENCOUNTER — TELEPHONE (OUTPATIENT)
Dept: FAMILY MEDICINE | Facility: CLINIC | Age: 69
End: 2019-11-14

## 2019-11-14 DIAGNOSIS — K21.9 GERD (GASTROESOPHAGEAL REFLUX DISEASE): Primary | ICD-10-CM

## 2019-11-14 NOTE — TELEPHONE ENCOUNTER
The ranitidine recall has now extended to the tablet form so we are reaching out to change Juan Jose's Ranitidine 300mg rx to Famotidine 40mg with the same directions.  Also due to the cost of the prevacid (over the counter..his insurance will not cover prevacid or nexium) he is willing to try prilosec again so wants to also switch the prevacid to 40mg omeprazole if you are willing.  He has taken it in the past and cant remember if it was effective or not, however, he is willing to try again.    Surinder Nicole [facility].  [facility]   [phone]  [email or web address]

## 2019-11-22 RX ORDER — OMEPRAZOLE 40 MG/1
40 CAPSULE, DELAYED RELEASE ORAL DAILY
Qty: 30 CAPSULE | Refills: 0 | Status: SHIPPED | OUTPATIENT
Start: 2019-11-22 | End: 2020-11-16

## 2019-11-22 RX ORDER — FAMOTIDINE 40 MG/1
40 TABLET, FILM COATED ORAL DAILY
Qty: 30 TABLET | Refills: 0 | Status: SHIPPED | OUTPATIENT
Start: 2019-11-22 | End: 2019-12-17

## 2019-11-26 ENCOUNTER — TELEPHONE (OUTPATIENT)
Dept: DERMATOLOGY | Facility: CLINIC | Age: 69
End: 2019-11-26

## 2019-11-26 ENCOUNTER — OFFICE VISIT (OUTPATIENT)
Dept: DERMATOLOGY | Facility: CLINIC | Age: 69
End: 2019-11-26
Payer: COMMERCIAL

## 2019-11-26 VITALS — HEART RATE: 87 BPM | SYSTOLIC BLOOD PRESSURE: 134 MMHG | DIASTOLIC BLOOD PRESSURE: 82 MMHG

## 2019-11-26 DIAGNOSIS — L72.9 CYST OF SKIN: Primary | ICD-10-CM

## 2019-11-26 ASSESSMENT — PAIN SCALES - GENERAL: PAINLEVEL: NO PAIN (0)
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
## 2019-11-26 NOTE — NURSING NOTE
Chief Complaint   Patient presents with     Derm Problem     nail removal L hand index finger     Deepthi Park, EMT

## 2019-11-26 NOTE — TELEPHONE ENCOUNTER
Called patient, no one answered. Left a voicemail to see if they can come in earlier than their Appointment time.  
No, Declined

## 2019-12-03 ENCOUNTER — OFFICE VISIT (OUTPATIENT)
Dept: SLEEP MEDICINE | Facility: CLINIC | Age: 69
End: 2019-12-03
Payer: COMMERCIAL

## 2019-12-03 VITALS
WEIGHT: 288 LBS | RESPIRATION RATE: 18 BRPM | HEART RATE: 78 BPM | DIASTOLIC BLOOD PRESSURE: 83 MMHG | OXYGEN SATURATION: 94 % | SYSTOLIC BLOOD PRESSURE: 123 MMHG | BODY MASS INDEX: 35.81 KG/M2 | HEIGHT: 75 IN

## 2019-12-03 DIAGNOSIS — G47.33 OSA ON CPAP: Primary | ICD-10-CM

## 2019-12-03 PROCEDURE — 99214 OFFICE O/P EST MOD 30 MIN: CPT | Performed by: INTERNAL MEDICINE

## 2019-12-03 ASSESSMENT — MIFFLIN-ST. JEOR: SCORE: 2171.99

## 2019-12-03 NOTE — PROGRESS NOTES
Sleep clinic follow-up visit:     Date on this visit: 12/3/2019     Juan Jose Razo is a 68 year old male with PMH of  HTN, GERD, and severe BUCK comes in today for follow-up.     HST was performed on 10/12/17 with an AHI of 32.7/hr. He was set up on October 27, 2017 with Resmed AirSense 10 Auto pressures set at 5-15 cm H2O.    He is currently being treated with auto titrating CPAP with pressure settings between 10 to 18 cm water.     He reports using the CPAP regularly during sleep and uses a fullface mask. He reports good sleep quality with the CPAP use. He and his wife sleep in separate rooms so  he cannot report about snoring with device,  but he denies awakenings due to gasping for air or choking with the CPAP machine.  He denies fatigue or excessive daytime sleepiness.  He denies drowsiness while driving.  His [place] sleepiness score is 5/24.    He has been exercising- walking 3 or 4 days a week.    He weighed 292 lbs during his last sleep clinic visit on 12/18/2018.  His current weight is 288 lbs.     DOWNLOADABLE COMPLIANCE DATA:   For the past 1 month was reviewed. He used the device for 30 out of 30 days with an average use of 8 hours and 33 minutes on the days used. 95th percentile on air leak was 9.3 l/min. Residual AHI was 3.5 per hour. Median pressure settings:  13.3; 95th percentile :  16.5 and max pressure: 17.7 cm water     Current meds:  Current Outpatient Medications   Medication Sig Dispense Refill     amLODIPine (NORVASC) 5 MG tablet TAKE TWO TABLETS BY MOUTH EVERY  tablet 3     cholecalciferol (VITAMIN D3) 1000 UNIT tablet Take by mouth daily       famotidine (PEPCID) 40 MG tablet Take 1 tablet (40 mg) by mouth daily 30 tablet 0     hydrocortisone (WESTCORT) 0.2 % external cream Apply topically 2 times daily 60 g 1     LANsoprazole (PREVACID) 30 MG DR capsule Take 1 capsule (30 mg) by mouth daily 90 capsule 3     metoprolol succinate (TOPROL-XL) 100 MG 24 hr tablet Take 1 tablet (100 mg)  "by mouth daily 90 tablet 3     metoprolol succinate ER (TOPROL-XL) 100 MG 24 hr tablet Take 1 tablet (100 mg) by mouth daily 90 tablet 3     Multiple Vitamins-Minerals (CENTRUM SILVER ULTRA MENS PO)        nystatin (MYCOSTATIN) 020922 UNIT/GM external cream Apply topically 2 times daily Mix with steroid twice daily during flare of rash until resolved. 60 g 1     omeprazole (PRILOSEC) 40 MG DR capsule Take 1 capsule (40 mg) by mouth daily 30 capsule 0     ranitidine (ZANTAC) 300 MG tablet TAKE ONE TABLET BY MOUTH EVERY NIGHT AT BEDTIME 90 tablet 3     triamcinolone (KENALOG) 0.025 % external ointment Apply topically 2 times daily To groin rash until resolved. 80 g 3     Problem list:  Patient Active Problem List   Diagnosis     CARDIOVASCULAR SCREENING; LDL GOAL LESS THAN 130     Hypertension goal BP (blood pressure) < 140/90     GERD (gastroesophageal reflux disease)     Seasonal allergic rhinitis     Advanced directives, counseling/discussion     Skin exam, screening for cancer     Multiple benign nevi     Seborrheic keratoses     Skin cancer screening     Morbid obesity (H)     Past medical history, Past surgical history, Allergies, Social history, Family history: reviewed, per EMR    Exam:  /83   Pulse 78   Resp 18   Ht 1.905 m (6' 3\")   Wt 130.6 kg (288 lb)   SpO2 94%   BMI 36.00 kg/m    General appearance:  in no apparent distress  Pt is dressed casually, cooperative with good eye contact.   Speech is spontaneous with regular rate and volume.   Mood: euthymic; affect congruent with full range and intensity.   Sensorium: awake, alert and oriented to person, place, time, and situation.    Assessment/plan:  Severe obstructive sleep apnea: Patient reports good compliance with CPAP device and reports positive benefits with CPAP use. Based on the compliance measures, BUCK is adequately controlled with the CPAP at the current pressure settings.  Patient denies any symptoms of air hunger and was comfortable " "with the current minimum pressure setting at 10 cm water,  but based on the compliance measures, I have recommended increasing the maximum pressure setting to 20 cm water, while the minimum pressure will remain the same at 10 cm water.  Prescription for renewal of all the CPAP supplies was provided.  Recommended him  to continue using the CPAP regularly during sleep and instructed him to get the supplies for the CPAP replaced regularly.      We discussed weight management with diet and exercise.    Patient was strongly advised to avoid driving, operating any heavy machinery or other hazardous situations while drowsy or sleepy.  Patient was counseled on the importance of driving while alert, to pull over if drowsy, or nap before getting into the vehicle if sleepy.      F/U in 1 year or sooner if there are any concerns.      The above note was dictated using voice recognition software. Although reviewed after completion, some word and grammatical error may remain . Please contact the author for any clarifications.     \"I spent a total of 25 minutes face to face with Juan Jose Razo during today's office visit. Over 50% of this time was spent counseling the patient and  coordinating care regarding sleep apnea, CPAP treatment, weight management.\"     MD ROSALIND Silverio [facility]   [address] 1, [address]   [address] [place] [address]. [place] [address]   Appointments: [phone]          "

## 2019-12-03 NOTE — PATIENT INSTRUCTIONS
Your BMI is Body mass index is 36 kg/m .  Weight management is a personal decision.  If you are interested in exploring weight loss strategies, the following discussion covers the approaches that may be successful. Body mass index (BMI) is one way to tell whether you are at a healthy weight, overweight, or obese. It measures your weight in relation to your height.  A BMI of 18.5 to 24.9 is in the healthy range. A person with a BMI of 25 to 29.9 is considered overweight, and someone with a BMI of 30 or greater is considered obese. More than two-thirds of American adults are considered overweight or obese.  Being overweight or obese increases the risk for further weight gain. Excess weight may lead to heart disease and diabetes.  Creating and following plans for healthy eating and physical activity may help you improve your health.  Weight control is part of healthy lifestyle and includes exercise, emotional health, and healthy eating habits. Careful eating habits lifelong are the mainstay of weight control. Though there are significant health benefits from weight loss, long-term weight loss with diet alone may be very difficult to achieve- studies show long-term success with dietary management in less than 10% of people. Attaining a healthy weight may be especially difficult to achieve in those with severe obesity. In some cases, medications, devices and surgical management might be considered.  What can you do?  If you are overweight or obese and are interested in methods for weight loss, you should discuss this with your provider.     Consider reducing daily calorie intake by 500 calories.     Keep a food journal.     Avoiding skipping meals, consider cutting portions instead.    Diet combined with exercise helps maintain muscle while optimizing fat loss. Strength training is particularly important for building and maintaining muscle mass. Exercise helps reduce stress, increase energy, and improves fitness.  Increasing exercise without diet control, however, may not burn enough calories to loose weight.       Start walking three days a week 10-20 minutes at a time    Work towards walking thirty minutes five days a week     Eventually, increase the speed of your walking for 1-2 minutes at time    In addition, we recommend that you review healthy lifestyles and methods for weight loss available through the National Institutes of Health patient information sites:  http://win.niddk.nih.gov/publications/index.htm    And look into health and wellness programs that may be available through your health insurance provider, employer, local community center, or [employer].    Weight management plan: Patient was referred to their PCP to discuss a diet and exercise plan.

## 2019-12-17 DIAGNOSIS — K21.9 GERD (GASTROESOPHAGEAL REFLUX DISEASE): ICD-10-CM

## 2019-12-17 RX ORDER — FAMOTIDINE 40 MG/1
TABLET, FILM COATED ORAL
Qty: 30 TABLET | Refills: 0 | Status: SHIPPED | OUTPATIENT
Start: 2019-12-17 | End: 2020-01-20

## 2019-12-17 NOTE — TELEPHONE ENCOUNTER
Prescription approved per [facility] Refill Protocol.    Kyra Ruiz RN   [facility]

## 2019-12-17 NOTE — TELEPHONE ENCOUNTER
"Requested Prescriptions   Pending Prescriptions Disp Refills     famotidine (PEPCID) 40 MG tablet [Pharmacy Med Name: FAMOTIDINE 40MG TABS] 30 tablet 0     Sig: TAKE ONE TABLET BY MOUTH ONCE DAILY  Last Written Prescription Date:  11/22/2019  Last Fill Quantity: 30,  # refills: 0   Last office visit: 10/24/2019 with prescribing provider:  10/24/2019   Future Office Visit:         H2 Blockers Protocol Passed - 12/17/2019  7:53 AM        Passed - Patient is age 12 or older        Passed - Recent (12 mo) or future (30 days) visit within the authorizing provider's specialty     Patient has had an office visit with the authorizing provider or a provider within the authorizing providers department within the previous 12 mos or has a future within next 30 days. See \"Patient Info\" tab in inbasket, or \"Choose Columns\" in Meds & Orders section of the refill encounter.              Passed - Medication is active on med list        "

## 2020-01-07 DIAGNOSIS — I10 HYPERTENSION GOAL BP (BLOOD PRESSURE) < 140/90: ICD-10-CM

## 2020-01-07 RX ORDER — AMLODIPINE BESYLATE 5 MG/1
TABLET ORAL
Qty: 180 TABLET | Refills: 3 | Status: SHIPPED | OUTPATIENT
Start: 2020-01-07 | End: 2020-12-28

## 2020-01-20 DIAGNOSIS — K21.9 GERD (GASTROESOPHAGEAL REFLUX DISEASE): ICD-10-CM

## 2020-01-20 RX ORDER — FAMOTIDINE 40 MG/1
TABLET, FILM COATED ORAL
Qty: 30 TABLET | Refills: 0 | Status: SHIPPED | OUTPATIENT
Start: 2020-01-20 | End: 2020-10-26

## 2020-01-20 NOTE — TELEPHONE ENCOUNTER
"Requested Prescriptions   Pending Prescriptions Disp Refills     famotidine (PEPCID) 40 MG tablet [Pharmacy Med Name: FAMOTIDINE 40MG TABS] 30 tablet 0     Sig: TAKE 1TABLET BY MOUTH ONCE DAILY  Last Written Prescription Date:  12/17/2019  Last Fill Quantity: 30,  # refills: 0   Last office visit: 10/24/2019 with prescribing provider:  10/24/2019   Future Office Visit:         H2 Blockers Protocol Passed - 1/20/2020  8:39 AM        Passed - Patient is age 12 or older        Passed - Recent (12 mo) or future (30 days) visit within the authorizing provider's specialty     Patient has had an office visit with the authorizing provider or a provider within the authorizing providers department within the previous 12 mos or has a future within next 30 days. See \"Patient Info\" tab in inbasket, or \"Choose Columns\" in Meds & Orders section of the refill encounter.              Passed - Medication is active on med list        "

## 2020-01-20 NOTE — TELEPHONE ENCOUNTER
Prescription approved per [facility] Refill Protocol.  Arlene Ramon RN on 1/20/2020 at 10:22 AM

## 2020-02-04 ENCOUNTER — DOCUMENTATION ONLY (OUTPATIENT)
Dept: SLEEP MEDICINE | Facility: CLINIC | Age: 70
End: 2020-02-04

## 2020-02-04 NOTE — PROGRESS NOTES
PT BROUGHT HIS CPAP TO [facility] AND IT WAS MAKING VERY LOUD WHINING SOUND THAT CHANGES WHEN HE INHALES AND EXHALES.  PT REQUESTED A LOANER MACHINE AND TO SEND HIS MACHINE FOR REPAIRS.  PT SETUP W/ LOANER, LOANER ENTERED INTO 1234ENTER.

## 2020-02-20 DIAGNOSIS — K21.9 GASTROESOPHAGEAL REFLUX DISEASE WITHOUT ESOPHAGITIS: Primary | ICD-10-CM

## 2020-02-20 RX ORDER — FAMOTIDINE 20 MG/1
TABLET, FILM COATED ORAL
Qty: 60 TABLET | Refills: 0 | Status: SHIPPED | OUTPATIENT
Start: 2020-02-20

## 2020-02-20 NOTE — TELEPHONE ENCOUNTER
"Requested Prescriptions   Pending Prescriptions Disp Refills     FAMOTIDINE 20 MG PO tablet [Pharmacy Med Name: FAMOTIDINE 20MG TABS] 60 tablet 0     Sig: TAKE TWO TABLETS BY MOUTH ONCE DAILY  Last Written Prescription Date:  10/20/2020  Last Fill Quantity: 30,  # refills: 0   Last office visit: 10/24/2019 with prescribing provider:  10/24/2019   Future Office Visit:         H2 Blockers Protocol Passed - 2/20/2020  8:24 AM        Passed - Patient is age 12 or older        Passed - Recent (12 mo) or future (30 days) visit within the authorizing provider's specialty     Patient has had an office visit with the authorizing provider or a provider within the authorizing providers department within the previous 12 mos or has a future within next 30 days. See \"Patient Info\" tab in inbasket, or \"Choose Columns\" in Meds & Orders section of the refill encounter.              Passed - Medication is active on med list        "

## 2020-02-20 NOTE — TELEPHONE ENCOUNTER
Prescription approved per [facility] Refill Protocol.  Arlene Ramon RN on 2/20/2020 at 10:29 AM

## 2020-10-13 ENCOUNTER — TELEPHONE (OUTPATIENT)
Dept: SLEEP MEDICINE | Facility: CLINIC | Age: 70
End: 2020-10-13

## 2020-10-21 ENCOUNTER — CARE COORDINATION (OUTPATIENT)
Dept: SLEEP MEDICINE | Facility: CLINIC | Age: 70
End: 2020-10-21

## 2020-10-21 DIAGNOSIS — I10 HYPERTENSION GOAL BP (BLOOD PRESSURE) < 140/90: ICD-10-CM

## 2020-10-21 RX ORDER — METOPROLOL SUCCINATE 100 MG/1
100 TABLET, EXTENDED RELEASE ORAL DAILY
Qty: 90 TABLET | Refills: 0 | Status: SHIPPED | OUTPATIENT
Start: 2020-10-21 | End: 2020-12-28

## 2020-10-21 NOTE — TELEPHONE ENCOUNTER
Prescription approved per [facility] Refill Protocol.    Kyra Ruiz RN   [facility]

## 2020-10-23 NOTE — PROGRESS NOTES
[facility] sent denial of medical coverage paperwork to Dr. Briseno denying ,cpap and supplies due to not showing use of cpap not consistently used for 70  Percent of nights.      Scan of denial in [facility].  Routed to Dr. Briseno.     Peer to Peer discussion can be done by calling [phone] select option 2, and enter reference number [id number] to speak with clinician .

## 2020-10-26 ENCOUNTER — OFFICE VISIT (OUTPATIENT)
Dept: FAMILY MEDICINE | Facility: CLINIC | Age: 70
End: 2020-10-26
Payer: COMMERCIAL

## 2020-10-26 VITALS
WEIGHT: 288 LBS | DIASTOLIC BLOOD PRESSURE: 88 MMHG | HEART RATE: 80 BPM | HEIGHT: 75 IN | TEMPERATURE: 97.3 F | OXYGEN SATURATION: 98 % | SYSTOLIC BLOOD PRESSURE: 136 MMHG | RESPIRATION RATE: 18 BRPM | BODY MASS INDEX: 35.81 KG/M2

## 2020-10-26 DIAGNOSIS — Z00.00 ENCOUNTER FOR MEDICARE ANNUAL WELLNESS EXAM: Primary | ICD-10-CM

## 2020-10-26 DIAGNOSIS — Z13.6 CARDIOVASCULAR SCREENING; LDL GOAL LESS THAN 130: ICD-10-CM

## 2020-10-26 DIAGNOSIS — K21.9 GASTROESOPHAGEAL REFLUX DISEASE, UNSPECIFIED WHETHER ESOPHAGITIS PRESENT: ICD-10-CM

## 2020-10-26 DIAGNOSIS — M25.572 ACUTE BILATERAL ANKLE PAIN: ICD-10-CM

## 2020-10-26 DIAGNOSIS — N52.01 ERECTILE DYSFUNCTION DUE TO ARTERIAL INSUFFICIENCY: ICD-10-CM

## 2020-10-26 DIAGNOSIS — I83.893 VARICOSE VEINS OF BOTH LEGS WITH EDEMA: ICD-10-CM

## 2020-10-26 DIAGNOSIS — I10 HYPERTENSION GOAL BP (BLOOD PRESSURE) < 140/90: ICD-10-CM

## 2020-10-26 DIAGNOSIS — M25.571 ACUTE BILATERAL ANKLE PAIN: ICD-10-CM

## 2020-10-26 DIAGNOSIS — K43.9 VENTRAL HERNIA WITHOUT OBSTRUCTION OR GANGRENE: ICD-10-CM

## 2020-10-26 DIAGNOSIS — E66.01 MORBID OBESITY (H): ICD-10-CM

## 2020-10-26 LAB
ALBUMIN SERPL-MCNC: 4 G/DL (ref 3.4–5)
ALP SERPL-CCNC: 84 U/L (ref 40–150)
ALT SERPL W P-5'-P-CCNC: 39 U/L (ref 0–70)
ANION GAP SERPL CALCULATED.3IONS-SCNC: 5 MMOL/L (ref 3–14)
AST SERPL W P-5'-P-CCNC: 33 U/L (ref 0–45)
BILIRUB SERPL-MCNC: 1.3 MG/DL (ref 0.2–1.3)
BUN SERPL-MCNC: 13 MG/DL (ref 7–30)
CALCIUM SERPL-MCNC: 9 MG/DL (ref 8.5–10.1)
CHLORIDE SERPL-SCNC: 104 MMOL/L (ref 94–109)
CHOLEST SERPL-MCNC: 164 MG/DL
CO2 SERPL-SCNC: 26 MMOL/L (ref 20–32)
CREAT SERPL-MCNC: 1.01 MG/DL (ref 0.66–1.25)
CREAT UR-MCNC: 88 MG/DL
GFR SERPL CREATININE-BSD FRML MDRD: 76 ML/MIN/{1.73_M2}
GLUCOSE SERPL-MCNC: 94 MG/DL (ref 70–99)
HDLC SERPL-MCNC: 36 MG/DL
LDLC SERPL CALC-MCNC: 97 MG/DL
MICROALBUMIN UR-MCNC: 7 MG/L
MICROALBUMIN/CREAT UR: 8.2 MG/G CR (ref 0–17)
NONHDLC SERPL-MCNC: 128 MG/DL
POTASSIUM SERPL-SCNC: 4.2 MMOL/L (ref 3.4–5.3)
PROT SERPL-MCNC: 8.6 G/DL (ref 6.8–8.8)
SODIUM SERPL-SCNC: 135 MMOL/L (ref 133–144)
TRIGL SERPL-MCNC: 155 MG/DL

## 2020-10-26 PROCEDURE — 80061 LIPID PANEL: CPT | Performed by: FAMILY MEDICINE

## 2020-10-26 PROCEDURE — 36415 COLL VENOUS BLD VENIPUNCTURE: CPT | Performed by: FAMILY MEDICINE

## 2020-10-26 PROCEDURE — 80053 COMPREHEN METABOLIC PANEL: CPT | Performed by: FAMILY MEDICINE

## 2020-10-26 PROCEDURE — 99213 OFFICE O/P EST LOW 20 MIN: CPT | Mod: 25 | Performed by: FAMILY MEDICINE

## 2020-10-26 PROCEDURE — 82043 UR ALBUMIN QUANTITATIVE: CPT | Performed by: FAMILY MEDICINE

## 2020-10-26 PROCEDURE — 99397 PER PM REEVAL EST PAT 65+ YR: CPT | Mod: 25 | Performed by: FAMILY MEDICINE

## 2020-10-26 RX ORDER — SILDENAFIL CITRATE 20 MG/1
TABLET ORAL
Qty: 20 TABLET | Refills: 0 | Status: SHIPPED | OUTPATIENT
Start: 2020-10-26

## 2020-10-26 RX ORDER — SILDENAFIL CITRATE 20 MG/1
TABLET ORAL
Qty: 20 TABLET | Refills: 0 | Status: SHIPPED | OUTPATIENT
Start: 2020-10-26 | End: 2020-10-26

## 2020-10-26 ASSESSMENT — ENCOUNTER SYMPTOMS
FREQUENCY: 0
HEARTBURN: 0
HEADACHES: 0
SHORTNESS OF BREATH: 0
DIZZINESS: 0
DIARRHEA: 0
JOINT SWELLING: 0
EYE PAIN: 0
PALPITATIONS: 0
HEMATOCHEZIA: 0
ABDOMINAL PAIN: 0
DYSURIA: 0
ARTHRALGIAS: 1
SORE THROAT: 0
NAUSEA: 0
PARESTHESIAS: 0
MYALGIAS: 0
NERVOUS/ANXIOUS: 0
HEMATURIA: 0
CONSTIPATION: 0
COUGH: 0
FEVER: 0
CHILLS: 0
WEAKNESS: 0

## 2020-10-26 ASSESSMENT — ACTIVITIES OF DAILY LIVING (ADL): CURRENT_FUNCTION: NO ASSISTANCE NEEDED

## 2020-10-26 ASSESSMENT — MIFFLIN-ST. JEOR: SCORE: 2166.99

## 2020-10-26 NOTE — PROGRESS NOTES
"  SUBJECTIVE:   Juan Jose Razo is a 69 year old male who presents for Preventive Visit.      Patient has been advised of split billing requirements and indicates understanding: Yes  Are you in the first 12 months of your Medicare Part B coverage?  No    Physical Health:     Annual Exam:  In general, how would you rate your overall physical health?: good  Frequency of exercise:: 2-3 days/week  Do you usually eat at least 4 servings of fruit and vegetables a day, include whole grains & fiber, and avoid regularly eating high fat or \"junk\" foods? : No  Taking medications regularly:: Yes  Medication side effects:: None  Activities of Daily Living: no assistance needed  Home safety: lack of grab bars in the bathroom  Hearing Impairment:: no hearing concerns  In the past 6 months, have you been bothered by leaking of urine?: No  abdominal pain: No  Blood in stool: No  Blood in urine: No  chest pain: No  chills: No  congestion: No  constipation: No  cough: No  diarrhea: No  dizziness: No  ear pain: No  eye pain: No  nervous/anxious: No  fever: No  frequency: No  genital sores: No  headaches: No  hearing loss: No  heartburn: No  arthralgias: Yes  joint swelling: No  peripheral edema: No  mood changes: No  myalgias: No  nausea: No  dysuria: No  palpitations: No  Skin sensation changes: No  sore throat: No  urgency: No  rash: No  shortness of breath: No  visual disturbance: No  weakness: No  impotence: Yes  penile discharge: No  In general, how would you rate your overall mental or emotional health?: excellent  Additional concerns today:: Yes  Duration of exercise:: 15-30 minutes    Mental Health:    PHQ-2 Score: (P) 0    Do you feel safe in your environment? Yes    Have you ever done Advance Care Planning? (For example, a Health Directive, POLST, or a discussion with a medical provider or your loved ones about your wishes): Yes, advance care planning is on file.    Additional concerns to address?  YES    Fall risk:  Fallen 2 " or more times in the past year?: No  Any fall with injury in the past year?: No    Cognitive Screenin) Repeat 3 items (Leader, Season, Table)    2) Clock draw: NORMAL  3) 3 item recall: Recalls 3 objects  Results: 3 items recalled: COGNITIVE IMPAIRMENT LESS LIKELY    Mini-CogTM Copyright MELECIO Lutz. Licensed by the author for use in [facility]; reprinted with permission (jhoan@[email or web address]). All rights reserved.      Do you have sleep apnea, excessive snoring or daytime drowsiness?: yes- sleep apnea    Encounter Diagnoses   Name Primary?     Encounter for Medicare annual wellness exam Yes     Hypertension goal BP (blood pressure) < 140/90      Gastroesophageal reflux disease, unspecified whether esophagitis present. Well controlled with Pepcid, wants to contiue      Varicose veins of both legs with edema, worse . Leg spainful at tomes, wants to try TEDS      CARDIOVASCULAR SCREENING; LDL GOAL LESS THAN 130      Morbid obesity (H), hard to walk with ankle pain      Ventral hernia without obstruction or gangrene      Erectile dysfunction due to arterial insufficiency, wants to try viagra genric      Acute bilateral ankle pain wit walking, just got new shoes         Hypertension Follow-up      Do you check your blood pressure regularly outside of the clinic? Yes     Are you following a low salt diet? Yes    Are your blood pressures ever more than 140 on the top number (systolic) OR more   than 90 on the bottom number (diastolic), for example 140/90? No      Reviewed and updated as needed this visit by clinical staff  Tobacco  Allergies  Meds  Problems  Med Hx  Surg Hx  Fam Hx          Reviewed and updated as needed this visit by Provider                Social History     Tobacco Use     Smoking status: Never Smoker     Smokeless tobacco: Never Used   Substance Use Topics     Alcohol use: Yes     Comment: 1-2 beers a week                            Current providers sharing in care for this patient  "include:   Patient Care Team:  Alphonso Grover MD as PCP - General (Family Practice)  Imelda Rincon PA-C as Physician Assistant (Physician Assistant)  Servando Diane MD as MD (Gastroenterology)  Alphonso Grover MD as Assigned PCP    The following health maintenance items are reviewed in Epic and correct as of today:  Health Maintenance   Topic Date Due     AORTIC ANEURYSM SCREENING (SYSTEM ASSIGNED)  12/17/2017     FALL RISK ASSESSMENT  10/09/2019     MEDICARE ANNUAL WELLNESS VISIT  10/26/2021     ADVANCE CARE PLANNING  05/23/2023     LIPID  10/24/2024     COLORECTAL CANCER SCREENING  03/18/2026     DTAP/TDAP/TD IMMUNIZATION (4 - Td) 06/26/2027     HEPATITIS C SCREENING  Completed     PHQ-2  Completed     INFLUENZA VACCINE  Completed     Pneumococcal Vaccine: 65+ Years  Completed     ZOSTER IMMUNIZATION  Completed     Pneumococcal Vaccine: Pediatrics (0 to 5 Years) and At-Risk Patients (6 to 64 Years)  Aged Out     IPV IMMUNIZATION  Aged Out     MENINGITIS IMMUNIZATION  Aged Out     HEPATITIS B IMMUNIZATION  Aged Out     Lab work is in process  Pneumonia Vaccine:Adults age 65+ who received Pneumovax (PPSV23) at 65 years or older: Should be given PCV13 > 1 year after their most recent PPSV23    ROS:  Constitutional, HEENT, cardiovascular, pulmonary, gi and gu systems are negative, except as otherwise noted.    OBJECTIVE:   /88   Pulse 80   Temp 97.3  F (36.3  C) (Temporal)   Resp 18   Ht 1.905 m (6' 3\")   Wt 130.6 kg (288 lb)   SpO2 98%   BMI 36.00 kg/m   Estimated body mass index is 36 kg/m  as calculated from the following:    Height as of this encounter: 1.905 m (6' 3\").    Weight as of this encounter: 130.6 kg (288 lb).  EXAM:   GENERAL: healthy, alert and no distress  EYES: Eyes grossly normal to inspection, PERRL and conjunctivae and sclerae normal  HENT: ear canals and TM's normal, nose and mouth without ulcers or lesions  NECK: no adenopathy, no asymmetry, " masses, or scars and thyroid normal to palpation  RESP: lungs clear to auscultation - no rales, rhonchi or wheezes  CV: regular rates and rhythm, normal S1 S2, no S3 or S4, no murmur, click or rub, peripheral pulses strong, no peripheral edema and varicosities both lower legs/ feet  ABDOMEN: soft, nontender, no hepatosplenomegaly, no masses and bowel sounds normal   (male): normal male genitalia without lesions or urethral discharge, no hernia  RECTAL (male): normal sphincter tone, no rectal masses, prostate normal size, smooth, nontender without nodules or masses  MS: normal muscle tone, peripheral pulses normal and tenderness to palpation both ankles  SKIN: no suspicious lesions or rashes  NEURO: Normal strength and tone, mentation intact and speech normal  PSYCH: mentation appears normal, affect normal/bright  LYMPH: no cervical, supraclavicular, axillary, or inguinal adenopathy    Diagnostic Test Results:  Labs reviewed in Epic    ASSESSMENT / PLAN:   1. Encounter for Medicare annual wellness exam  Overall well    2. Hypertension goal BP (blood pressure) < 140/90  Well controlled   - Lipid panel reflex to direct LDL Fasting  - Comprehensive metabolic panel  - Albumin Random Urine Quantitative with Creat Ratio  - OFFICE/OUTPT VISIT,EST,LEVL III    3. Gastroesophageal reflux disease, unspecified whether esophagitis present  with carol medication/ diet    4. Varicose veins of both legs with edema  Painful so try  - Compression Sleeve/Stocking Order for DME - ONLY FOR DME    5. CARDIOVASCULAR SCREENING; LDL GOAL LESS THAN 130  recheck  - Lipid panel reflex to direct LDL Fasting    6. Morbid obesity (H)  Reviewed diet  - OFFICE/OUTPT VISIT,EST,LEVL III    7. Ventral hernia without obstruction or gangrene  stbale    8. Erectile dysfunction due to arterial insufficiency  try  - sildenafil (REVATIO) 20 MG tablet; Take 2-3 as needed for ED  Dispense: 20 tablet; Refill: 0  - OFFICE/OUTPT VISIT,EST,LEVL III    9. Acute  "bilateral ankle pain  see  - [facility]; Future  - OFFICE/OUTPT VISIT,EST,LEVL III    Patient has been advised of split billing requirements and indicates understanding: Yes    COUNSELING:  Reviewed preventive health counseling, as reflected in patient instructions       Regular exercise       Healthy diet/nutrition       Vision screening       Hearing screening       Dental care       Bladder control       Fall risk prevention       Colon cancer screening       Prostate cancer screening he declined    Estimated body mass index is 36 kg/m  as calculated from the following:    Height as of this encounter: 1.905 m (6' 3\").    Weight as of this encounter: 130.6 kg (288 lb).    Weight management plan: Discussed healthy diet and exercise guidelines    He reports that he has never smoked. He has never used smokeless tobacco.    Appropriate preventive services were discussed with this patient, including applicable screening as appropriate for cardiovascular disease, diabetes, osteopenia/osteoporosis, and glaucoma.  As appropriate for age/gender, discussed screening for colorectal cancer, prostate cancer, breast cancer, and cervical cancer. Checklist reviewing preventive services available has been given to the patient.    Reviewed patients plan of care and provided an AVS. The Intermediate Care Plan ( asthma action plan, low back pain action plan, and migraine action plan) for Juan Jose meets the Care Plan requirement. This Care Plan has been established and reviewed with the Patient.    Counseling Resources:  ATP IV Guidelines  Pooled Cohorts Equation Calculator  Breast Cancer Risk Calculator  BRCA-Related Cancer Risk Assessment: FHS-7 Tool  FRAX Risk Assessment  ICSI Preventive Guidelines  Dietary Guidelines for Americans, 2010  USDA's MyPlate  ASA Prophylaxis  Lung CA Screening    Alphonso Grover MD  [facility] [place]  "

## 2020-10-26 NOTE — PATIENT INSTRUCTIONS
Patient Education   Personalized Prevention Plan  You are due for the preventive services outlined below.  Your care team is available to assist you in scheduling these services.  If you have already completed any of these items, please share that information with your care team to update in your medical record.  Health Maintenance Due   Topic Date Due     AORTIC ANEURYSM SCREENING (SYSTEM ASSIGNED)  12/17/2017     FALL RISK ASSESSMENT  10/09/2019

## 2020-11-02 ENCOUNTER — THERAPY VISIT (OUTPATIENT)
Dept: PHYSICAL THERAPY | Facility: CLINIC | Age: 70
End: 2020-11-02
Attending: FAMILY MEDICINE
Payer: COMMERCIAL

## 2020-11-02 DIAGNOSIS — M25.572 ACUTE BILATERAL ANKLE PAIN: ICD-10-CM

## 2020-11-02 DIAGNOSIS — M25.571 ACUTE BILATERAL ANKLE PAIN: ICD-10-CM

## 2020-11-02 PROCEDURE — 97110 THERAPEUTIC EXERCISES: CPT | Mod: GP | Performed by: PHYSICAL THERAPIST

## 2020-11-02 PROCEDURE — 97161 PT EVAL LOW COMPLEX 20 MIN: CPT | Mod: GP | Performed by: PHYSICAL THERAPIST

## 2020-11-02 ASSESSMENT — ACTIVITIES OF DAILY LIVING (ADL)
HIGHEST_POTENTIAL_ADL_SCORE:: 84
ACTIVITIES_OF_DAILY_LIVING_MEASURE_SCORE(%):: 95.24
TOTAL_ADL_ITEM_SCORE:: 80

## 2020-11-02 NOTE — PROGRESS NOTES
[facility] Initial Evaluation  Subjective:  The history is provided by the patient. No  was used.   Patient Health History  Juan Jose Razo being seen for Ankle soreness (B).     Problem began: 10/1/2019 (insidous onset (B)).      Pain is reported as 2/10 on pain scale.  General health as reported by patient is good.          Surgeries include:  Other. Other surgery history details: Varicose veins.    Current medications:  High blood pressure medication and other. Other medications details: Acid reflex.    Current occupation is Retired.   Primary job tasks include:  Other.   Other job/home tasks details: Home  duties.                Therapist Generated HPI Evaluation                 Site of Pain: (B) anterior shin   Pain is described as aching and is intermittent (Bilateral ).  Pain timing: does not matter.  Since onset symptoms are unchanged.  Associated with: stiffness. Exacerbated by: ambulation   Relieved by: rest.  Imaging testing: none.  Past treatment: none. Improved with treatment: na.  Work activity restrictions: retired.  Barriers include:  None as reported by patient.                        Objective:  System    Ankle/Foot Evaluation  ROM:    AROM:    Dorsiflexion:  Left:   15  Right:   15  Plantarflexion:  Left:  Wnl    Right:  Wnl3  Inversion:  Left:  30     Right:  30  Eversion:  12     Right:  12    Great Toe Extension:  Left:  5     Right: 5    Strength:    Dorsiflexion:  Left: 5/5     Pain:   Right: 5/5   Pain:  Plantarflexion: Right: 3-/5  Pain:  Inversion:Left: 3-/5  Pain:     Right: 5-/5  Pain:  Eversion:Left: 5-/5  Pain:  Right: 5-/5  Pain:    Extension Great Toe:Left: 4-/5  Pain:  Right: 4-/5  Pain:  Anterior Tibialis:Left: 5-/5  Pain:  Right: 5-/5  Pain:  Posterior Tibialis: Left: 5-/5  Pain:  Right: 5-/5  Pain:  Peroneals: Left: 5-/5  Pain:  Right: 5-/5  Pain:  Extensor Digitorum: Left: 3-/5  Pain:Right: 3-/5  Pain:  Gastroc/Soleus:Left: 3-/5    Pain:  Right: 3-/5  Pain:  LIGAMENT TESTING: not assessed              SPECIAL TESTS: not assessed      EDEMA: not assessed          MOBILITY TESTING:       Talocrural Right: normal  Subtalar Left: hypermobile    Subtalar Right: hypermobile  Midtarsal Left: hypermobile    Midtarsal Right: hypermobile    FUNCTIONAL TESTS: Functional test ankle: single leg balance eyes open 1 second each side                                                           tenderness with palpation  toe extensors tendons between malleoli, GS length: 0 degrees (B), HS length: mmt: gluteus medius: 3+/5 (B), mtp ext: 5 degrees (B)    General     ROS    Assessment/Plan:    Patient is a 69 year old male with both sides ankle complaints.    Patient has the following significant findings with corresponding treatment plan.                Diagnosis 1:  Acute bilateral ankle pain   Pain -  hot/cold therapy, manual therapy, self management, education, directional preference exercise and home program  Decreased ROM/flexibility - manual therapy, therapeutic exercise, therapeutic activity and home program  Decreased joint mobility - manual therapy, therapeutic exercise, therapeutic activity and home program  Decreased strength - therapeutic exercise, therapeutic activities and home program  Impaired balance - neuro re-education, therapeutic activities and home program  Impaired muscle performance - neuro re-education and home program  Decreased function - therapeutic activities and home program    Therapy Evaluation Codes:   care are:    1)   Clinical presentation characteristics are:   Stable/Uncomplicated.  2) Decision-Making    Low complexity using standardized patient assessment instrument and/or measureable assessment of functional outcome.  Cumulative Therapy Evaluation is: Low complexity.    Previous and current functional limitations:  (See Goal Flow Sheet for this information)    Short term and Long term goals: (See Goal Flow Sheet for this  information)     Communication ability:  Patient appears to be able to clearly communicate and understand verbal and written communication and follow directions correctly.  Treatment Explanation - The following has been discussed with the patient:   RX ordered/plan of care  Anticipated outcomes  Possible risks and side effects  This patient would benefit from PT intervention to resume normal activities.   Rehab potential is good.    Frequency:  1 X week, once daily  Duration:  for 6 weeks  Discharge Plan:  Achieve all LTG.  Independent in home treatment program.  Reach maximal therapeutic benefit.    Please refer to the daily flowsheet for treatment today, total treatment time and time spent performing 1:1 timed codes.

## 2020-11-09 ENCOUNTER — THERAPY VISIT (OUTPATIENT)
Dept: PHYSICAL THERAPY | Facility: CLINIC | Age: 70
End: 2020-11-09
Payer: COMMERCIAL

## 2020-11-09 DIAGNOSIS — M25.571 ACUTE BILATERAL ANKLE PAIN: Primary | ICD-10-CM

## 2020-11-09 DIAGNOSIS — M25.572 ACUTE BILATERAL ANKLE PAIN: Primary | ICD-10-CM

## 2020-11-09 PROCEDURE — 97110 THERAPEUTIC EXERCISES: CPT | Mod: GP | Performed by: PHYSICAL THERAPIST

## 2020-11-09 PROCEDURE — 97112 NEUROMUSCULAR REEDUCATION: CPT | Mod: GP | Performed by: PHYSICAL THERAPIST

## 2020-11-16 ENCOUNTER — THERAPY VISIT (OUTPATIENT)
Dept: PHYSICAL THERAPY | Facility: CLINIC | Age: 70
End: 2020-11-16
Payer: COMMERCIAL

## 2020-11-16 DIAGNOSIS — M25.572 ACUTE BILATERAL ANKLE PAIN: Primary | ICD-10-CM

## 2020-11-16 DIAGNOSIS — K21.9 GERD (GASTROESOPHAGEAL REFLUX DISEASE): ICD-10-CM

## 2020-11-16 DIAGNOSIS — M25.571 ACUTE BILATERAL ANKLE PAIN: Primary | ICD-10-CM

## 2020-11-16 PROCEDURE — 97112 NEUROMUSCULAR REEDUCATION: CPT | Mod: GP | Performed by: PHYSICAL THERAPIST

## 2020-11-16 PROCEDURE — 97110 THERAPEUTIC EXERCISES: CPT | Mod: GP | Performed by: PHYSICAL THERAPIST

## 2020-11-16 RX ORDER — OMEPRAZOLE 40 MG/1
CAPSULE, DELAYED RELEASE ORAL
Qty: 90 CAPSULE | Refills: 3 | Status: SHIPPED | OUTPATIENT
Start: 2020-11-16 | End: 2021-11-16

## 2020-11-16 NOTE — TELEPHONE ENCOUNTER
Prescription approved per [facility] Refill Protocol.    Kyra Ruiz RN   [facility]

## 2020-11-23 ENCOUNTER — THERAPY VISIT (OUTPATIENT)
Dept: PHYSICAL THERAPY | Facility: CLINIC | Age: 70
End: 2020-11-23
Payer: COMMERCIAL

## 2020-11-23 DIAGNOSIS — M25.571 ACUTE BILATERAL ANKLE PAIN: Primary | ICD-10-CM

## 2020-11-23 DIAGNOSIS — M25.572 ACUTE BILATERAL ANKLE PAIN: Primary | ICD-10-CM

## 2020-11-23 PROCEDURE — 97112 NEUROMUSCULAR REEDUCATION: CPT | Mod: GP

## 2020-11-23 PROCEDURE — 97110 THERAPEUTIC EXERCISES: CPT | Mod: GP

## 2020-11-30 ENCOUNTER — THERAPY VISIT (OUTPATIENT)
Dept: PHYSICAL THERAPY | Facility: CLINIC | Age: 70
End: 2020-11-30
Payer: COMMERCIAL

## 2020-11-30 DIAGNOSIS — M25.572 ACUTE BILATERAL ANKLE PAIN: Primary | ICD-10-CM

## 2020-11-30 DIAGNOSIS — M25.571 ACUTE BILATERAL ANKLE PAIN: Primary | ICD-10-CM

## 2020-11-30 PROCEDURE — 97112 NEUROMUSCULAR REEDUCATION: CPT | Mod: GP | Performed by: PHYSICAL THERAPIST
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
## 2020-12-04 ENCOUNTER — VIRTUAL VISIT (OUTPATIENT)
Dept: SLEEP MEDICINE | Facility: CLINIC | Age: 70
End: 2020-12-04
Payer: COMMERCIAL

## 2020-12-04 DIAGNOSIS — G47.33 OSA ON CPAP: Primary | ICD-10-CM

## 2020-12-04 PROCEDURE — 99442 PR PHYSICIAN TELEPHONE EVALUATION 11-20 MIN: CPT | Mod: 95 | Performed by: INTERNAL MEDICINE

## 2020-12-07 ENCOUNTER — THERAPY VISIT (OUTPATIENT)
Dept: PHYSICAL THERAPY | Facility: CLINIC | Age: 70
End: 2020-12-07
Payer: COMMERCIAL

## 2020-12-07 ENCOUNTER — TELEPHONE (OUTPATIENT)
Dept: SLEEP MEDICINE | Facility: CLINIC | Age: 70
End: 2020-12-07

## 2020-12-07 DIAGNOSIS — M25.572 ACUTE BILATERAL ANKLE PAIN: Primary | ICD-10-CM

## 2020-12-07 DIAGNOSIS — M25.571 ACUTE BILATERAL ANKLE PAIN: Primary | ICD-10-CM

## 2020-12-07 PROCEDURE — 97110 THERAPEUTIC EXERCISES: CPT | Mod: GP | Performed by: PHYSICAL THERAPIST

## 2020-12-07 PROCEDURE — 97112 NEUROMUSCULAR REEDUCATION: CPT | Mod: GP | Performed by: PHYSICAL THERAPIST

## 2020-12-21 ENCOUNTER — OFFICE VISIT (OUTPATIENT)
Dept: DERMATOLOGY | Facility: CLINIC | Age: 70
End: 2020-12-21
Payer: COMMERCIAL

## 2020-12-21 DIAGNOSIS — L30.4 INTERTRIGO: ICD-10-CM

## 2020-12-21 DIAGNOSIS — L82.1 SEBORRHEIC KERATOSIS: ICD-10-CM

## 2020-12-21 DIAGNOSIS — D18.01 CHERRY ANGIOMA: ICD-10-CM

## 2020-12-21 DIAGNOSIS — Z12.83 SKIN CANCER SCREENING: ICD-10-CM

## 2020-12-21 DIAGNOSIS — L57.0 ACTINIC KERATOSIS: Primary | ICD-10-CM

## 2020-12-21 PROCEDURE — 99214 OFFICE O/P EST MOD 30 MIN: CPT | Mod: 25 | Performed by: PHYSICIAN ASSISTANT

## 2020-12-21 PROCEDURE — 17000 DESTRUCT PREMALG LESION: CPT | Performed by: PHYSICIAN ASSISTANT

## 2020-12-21 RX ORDER — TRIAMCINOLONE ACETONIDE 0.25 MG/G
OINTMENT TOPICAL 2 TIMES DAILY
Qty: 80 G | Refills: 3 | Status: SHIPPED | OUTPATIENT
Start: 2020-12-21 | End: 2021-08-11

## 2020-12-21 RX ORDER — NYSTATIN 100000 U/G
CREAM TOPICAL
Qty: 60 G | Refills: 1 | Status: SHIPPED | OUTPATIENT
Start: 2020-12-21 | End: 2021-08-11

## 2020-12-21 ASSESSMENT — PAIN SCALES - GENERAL: PAINLEVEL: NO PAIN (0)

## 2020-12-21 NOTE — NURSING NOTE
Dermatology Rooming Note    Juan Jose Razo's goals for this visit include:   Chief Complaint   Patient presents with     Skin Check     Juan Jose is here today for a skin check. He does not have any areas of concern.     Sabiha Mckeon, CMA
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
## 2020-12-21 NOTE — PATIENT INSTRUCTIONS
Cryotherapy    What is it?    Use of a very cold liquid, such as liquid nitrogen, to freeze and destroy abnormal skin cells that need to be removed    What should I expect?    Tenderness and redness    A small blister that might grow and fill with dark purple blood. There may be crusting.    More than one treatment may be needed if the lesions do not go away.    How do I care for the treated area?    Gently wash the area with your hands when bathing.    Use a thin layer of Vaseline to help with healing. You may use a Band-Aid.     The area should heal within 7-10 days and may leave behind a pink or lighter color.     Do not use an antibiotic or Neosporin ointment.     You may take acetaminophen (Tylenol) for pain.     Call your Doctor if you have:    Severe pain    Signs of infection (warmth, redness, cloudy yellow drainage, and or a bad smell)    Questions or concerns    Who should I call with questions?       [facility]: [phone]       [facility]: [phone]       For urgent needs outside of business hours call the [facility] at [phone]        and ask for the dermatology resident on call

## 2020-12-21 NOTE — LETTER
12/21/2020       RE: Juan Jose Razo  [address] [facility] [id number]     Dear Colleague,    Thank you for referring your patient, Juan Jose Razo, to the [facility] [place] at [facility]. Please see a copy of my visit note below.    [facility] Dermatology Note      Dermatology Problem List:  1. SCC  - R nasal ala s/p MMS 9/24/2018  - Left temple s/p MMS 10/8/15  2. Hx of dysplastic nevus, pt reported  3. Onychomycosis  4. AKs  5. 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 Intertrigo in groin  - s/p, hydrocortisone 2.5% ointment  - hydrocortisone 0.2% cream, triamcinolone 0.025% ointment, nystatin cream  7. Verrucous vulgaris, right lateral neck s/p biopsy 3/12/18  9. Benign bx:  - SK, Left parietal scalp, 9/12/2018  10. Skin cancer screening 12/21/20     Encounter Date: Dec 21, 2020    CC:  Chief Complaint   Patient presents with     Skin Check     Juan Jose is here today for a skin check. He does not have any areas of concern.         History of Present Illness:  Mr. Juan Jose Razo is a 70 year old male with a history of non-melanoma skin cancer who presents for a skin cancer screening. He was last seen in the dermatology clinic on 11/26/19 with Dr. Barba for evaluation of a lesion on his left 2nd finger when he was schedule for an excision. When he presented to the clinic that day the lesion had already seemed to express and was no longer an issue. The surgical procedure was delayed. He no longer has any issues with this spot. . Today he reports that the lesion on his left 2nd finger is still growing. Besides this, he has no other lesions of interest at this time. Denies painful, bleeding, or changing lesions.     He was started on triamcinolone 0.025% ointment along with nystatin for his intertrigo. He states his has been under really good control. He rarely has to use the medications, less  than once per month.    Otherwise he is feeling well, without additional skin concerns at this time.      Past Medical History:   Patient Active Problem List   Diagnosis     CARDIOVASCULAR SCREENING; LDL GOAL LESS THAN 130     Hypertension goal BP (blood pressure) < 140/90     GERD (gastroesophageal reflux disease)     Seasonal allergic rhinitis     Advanced directives, counseling/discussion     Skin exam, screening for cancer     Multiple benign nevi     Seborrheic keratoses     Skin cancer screening     Morbid obesity (H)     Past Medical History:   Diagnosis Date     Acid reflux      Allergies      HTN (hypertension) 1990     Squamous cell carcinoma      Varicose veins      Past Surgical History:   Procedure Laterality Date     COLONOSCOPY  2001, 2006, 2010    polyp     keratinous cyst  12/09     LIGATE VEIN VARICOSE, PHLEBECTOMY MULTIPLE STAB, COMBINED  1995     MOHS MICROGRAPHIC PROCEDURE       NO HISTORY OF SURGERY  10/24/13    derm       Social History:   reports that he has never smoked. He has never used smokeless tobacco. He reports current alcohol use. He reports that he does not use drugs.    Family History:  Family History   Problem Relation Age of Onset     Cancer Mother         thyroid     Breast Cancer Sister      Skin Cancer Father      Melanoma No family hx of        Medications:  Current Outpatient Medications   Medication Sig Dispense Refill     amLODIPine (NORVASC) 5 MG tablet TAKE 2 TABLETS BY MOUTH ONCE DAILY 180 tablet 3     cholecalciferol (VITAMIN D3) 1000 UNIT tablet Take by mouth daily       FAMOTIDINE 20 MG PO tablet TAKE TWO TABLETS BY MOUTH ONCE DAILY 60 tablet 0     metoprolol succinate ER (TOPROL-XL) 100 MG 24 hr tablet Take 1 tablet (100 mg) by mouth daily Due for office visit call  to set this up 90 tablet 0     Multiple Vitamins-Minerals (CENTRUM SILVER ULTRA MENS PO)        nystatin (MYCOSTATIN) 873257 UNIT/GM external cream Apply topically 2 times daily Mix with  steroid twice daily during flare of rash until resolved. 60 g 1     omeprazole (PRILOSEC) 40 MG DR capsule TAKE ONE CAPSULE BY MOUTH ONCE DAILY 90 capsule 3     sildenafil (REVATIO) 20 MG tablet Take 2-3 as needed for ED 20 tablet 0     triamcinolone (KENALOG) 0.025 % external ointment Apply topically 2 times daily To groin rash until resolved. 80 g 3       No Known Allergies    Review of Systems:  -Constitutional: Otherwise in usual state of health.  -Skin: As above in HPI. No additional skin concerns.    Physical exam:  GEN: This is a well developed, well-nourished male in no acute distress, in a pleasant mood.    SKIN: Full skin, which includes the head/face, both arms, chest, back, abdomen,both legs, genitalia and/or groin buttocks, digits and/or nails, was examined.  -There is a gritty papule on the right forehead.  - No erythema or scaling to the inguinal folds  - Dome shaped bright red papules on the trunk and extremities.   - Waxy stuck on tan to brown papules on the trunk and extremities.  - No erythema, telangectasias or pigmentation on the right nasal ala.   - Scattered brown macules on sun exposed areas.  - Scattered round, brown symmetric macules and papules to trunk and extremities.  - No other lesions of concern on areas examined.     Impression/Plan:  1. History of SCC on left temple and right nasal ala  Scarring noted on the right nasal ala.     No evidence of recurrence on left temple or nasal ala. Continue regular skin checks, will resume checks every 6 months if he is developing more areas within the next year.     Sunscreen: Apply 20 minutes prior to going outdoors and reapply every two hours, when wet or sweating. We recommend using an SPF 30 or higher, and to use one that is water resistant.        2. History of Intertrigo of the groin, no active eruption today    Continue triamcinolone 0.025% ointment, apply BID when flaring    Continue Nystatin cream BID mixed with topical steroid and apply  BID when flaring    3. Actinic keratosis, right forehead.    Cryotherapy procedure note (performed by faculty): After verbal consent and discussion of risks and benefits including but no limited to dyspigmentation/scar, blister, infection,  recurrence, one was treated with 1-2mm freeze border for 2 cycles with liquid nitrogen. Post cryotherapy instructions were provided.   4. Seborrheic keratosis, trunk and extremities    Benign nature reassured, no further intervention required at this time.      5. Cherry angiomas    Reassured benign, No further intervention required. Patient to report changes.      6. Solar lentigines, sun exposed skin    Discussed SPF and sun protection.      7.     Multiple benign nevi, on the trunk and extremities. With a history of dysplastic nevi.     ABCDs of melanoma were discussed and self skin checks were advised.     Follow-up in 6 months, earlier for new or changing lesions.    Staff Involved:  All risks, benefits and alternatives were discussed with patient.  Patient is in agreement and understands the assessment and plan.  All questions were answered.  Sun Screen Education was given.   Return to Clinic in 6 months or sooner as needed.   Imelda Rincon PA-C   [facility]

## 2020-12-28 DIAGNOSIS — I10 HYPERTENSION GOAL BP (BLOOD PRESSURE) < 140/90: ICD-10-CM

## 2020-12-28 RX ORDER — AMLODIPINE BESYLATE 5 MG/1
TABLET ORAL
Qty: 180 TABLET | Refills: 3 | Status: SHIPPED | OUTPATIENT
Start: 2020-12-28 | End: 2021-12-27

## 2020-12-28 RX ORDER — METOPROLOL SUCCINATE 100 MG/1
100 TABLET, EXTENDED RELEASE ORAL DAILY
Qty: 90 TABLET | Refills: 0 | Status: SHIPPED | OUTPATIENT
Start: 2020-12-28 | End: 2021-04-19

## 2020-12-29 ENCOUNTER — THERAPY VISIT (OUTPATIENT)
Dept: PHYSICAL THERAPY | Facility: CLINIC | Age: 70
End: 2020-12-29
Payer: COMMERCIAL

## 2020-12-29 DIAGNOSIS — M25.571 ACUTE BILATERAL ANKLE PAIN: Primary | ICD-10-CM

## 2020-12-29 DIAGNOSIS — M25.572 ACUTE BILATERAL ANKLE PAIN: Primary | ICD-10-CM

## 2020-12-29 PROCEDURE — 97112 NEUROMUSCULAR REEDUCATION: CPT | Mod: GP | Performed by: PHYSICAL THERAPIST

## 2020-12-29 PROCEDURE — 97110 THERAPEUTIC EXERCISES: CPT | Mod: GP | Performed by: PHYSICAL THERAPIST

## 2020-12-29 ASSESSMENT — ACTIVITIES OF DAILY LIVING (ADL)
TOTAL_ADL_ITEM_SCORE:: 81
HIGHEST_POTENTIAL_ADL_SCORE:: 84
ACTIVITIES_OF_DAILY_LIVING_MEASURE_SCORE(%):: 96.43

## 2020-12-29 NOTE — PROGRESS NOTES
Subjective:  The history is provided by the spouse.     Physical Exam                    Objective:  System    Physical Exam    General     ROS    Assessment/Plan:    DISCHARGE REPORT    Progress reporting period is from 11-2-2020 to 12-.       SUBJECTIVE  Subjective changes noted by patient:  Moderate improvement overall and increased walking 0-5 minutes pain free tolerance to 15-20 minutes. Bilateral ankles flared up with vacuming 30 minutes. New shoes are working out well and avoiding barefoot walking .    Current pain level is 1-2/10   Initial Pain level: 2/10.   Changes in function:  Yes (See Goal flowsheet attached for changes in current functional level)  Adverse reaction to treatment or activity: None    OBJECTIVE  Changes noted in objective findings:  Yes,  Ankle/Foot Evaluation  ROM:    AROM:    Dorsiflexion:  Left:   15  Right:   15  Plantarflexion:  Left:  Wnl    Right:  Wnl3  Inversion:  Left:  30     Right:  30  Eversion:  12     Right:  12     Great Toe Extension:  Left:  5     Right: 5     Strength:    Dorsiflexion:  Left: 5/5     Pain:   Right: 5/5   Pain:  Plantarflexion: Right: 3-/5  Pain:  Inversion:Left: 3-/5  Pain:     Right: 5-/5  Pain:  Eversion:Left: 5-/5  Pain:  Right: 5-/5  Pain:     Extension Great Toe:Left: 4-/5  Pain:  Right: 4-/5  Pain:  Anterior Tibialis:Left: 5-/5  Pain:  Right: 5-/5  Pain:  Posterior Tibialis: Left: 5-/5  Pain:  Right: 5-/5  Pain:  Peroneals: Left: 5-/5  Pain:  Right: 5-/5  Pain:  Extensor Digitorum: Left: 3-/5  Pain:Right: 3-/5  Pain:  Gastroc/Soleus:Left: 3-/5   Pain:  Right: 3-/5  Pain:  LIGAMENT TESTING: not assessed                    SPECIAL TESTS: not assessed        EDEMA: not assessed           MOBILITY TESTING:         Talocrural Right: normal  Subtalar Left: hypermobile    Subtalar Right: hypermobile  Midtarsal Left: hypermobile    Midtarsal Right: hypermobile     FUNCTIONAL TESTS: Functional test ankle: single leg balance eyes open 1 second each  side                                                                 tenderness with palpation  toe extensors tendons between malleoli, GS length: 0 degrees (B), HS length: mmt: gluteus medius: 3+/5 (B), mtp ext: 5 degrees (B)     General      ROS     Assessment/Plan:    Patient is a 69 year old male with both sides ankle complaints.    Patient has the following significant findings with corresponding treatment plan.                Diagnosis 1:  Acute bilateral ankle pain   Pain -  hot/cold therapy, manual therapy, self management, education, directional preference exercise and home program  Decreased ROM/flexibility - manual therapy, therapeutic exercise, therapeutic activity and home program  Decreased joint mobility - manual therapy, therapeutic exercise, therapeutic activity and home program  Decreased strength - therapeutic exercise, therapeutic activities and home program  Impaired balance - neuro re-education, therapeutic activities and home program  Impaired muscle performance - neuro re-education and home program  Decreased function - therapeutic activities and home program           ASSESSMENT/PLAN  Updated problem list and treatment plan: Diagnosis 1:  Diagnosis 1:  Acute bilateral ankle pain   Pain -  hot/cold therapy, manual therapy, self management, education, directional preference exercise and home program  Decreased ROM/flexibility - manual therapy, therapeutic exercise, therapeutic activity and home program  Decreased joint mobility - manual therapy, therapeutic exercise, therapeutic activity and home program  Decreased strength - therapeutic exercise, therapeutic activities and home program  Impaired balance - neuro re-education, therapeutic activities and home program  Impaired muscle performance - neuro re-education and home program  Decreased function - therapeutic activities and home program       STG/LTGs have been met or progress has been made towards goals:  Yes (See Goal flow sheet  completed today.)  Assessment of Progress: The patient's condition is improving.  Self Management Plans:  Patient is independent in a home treatment program.  Patient is independent in self management of symptoms.  I have re-evaluated this patient and find that the nature, scope, duration and intensity of the therapy is appropriate for the medical condition of the patient.  Juan Jose continues to require the following intervention to meet STG and LTG's:  PT intervention is no longer required to meet STG/LTG.    Recommendations:  This patient is ready to be discharged from therapy and continue their home treatment program.    Please refer to the daily flowsheet for treatment today, total treatment time and time spent performing 1:1 timed codes.

## 2021-02-22 ENCOUNTER — TELEPHONE (OUTPATIENT)
Dept: FAMILY MEDICINE | Facility: CLINIC | Age: 71
End: 2021-02-22

## 2021-02-22 ENCOUNTER — VIRTUAL VISIT (OUTPATIENT)
Dept: FAMILY MEDICINE | Facility: CLINIC | Age: 71
End: 2021-02-22
Payer: COMMERCIAL

## 2021-02-22 DIAGNOSIS — N41.0 ACUTE PROSTATITIS WITH HEMATURIA: Primary | ICD-10-CM

## 2021-02-22 PROCEDURE — 99213 OFFICE O/P EST LOW 20 MIN: CPT | Mod: 95 | Performed by: FAMILY MEDICINE

## 2021-02-22 RX ORDER — DOXYCYCLINE HYCLATE 100 MG
100 TABLET ORAL 2 TIMES DAILY
Qty: 42 TABLET | Refills: 0 | Status: SHIPPED | OUTPATIENT
Start: 2021-02-22 | End: 2021-03-15

## 2021-02-22 NOTE — PROGRESS NOTES
Juan Jose is a 70 year old who is being evaluated via a billable telephone visit.      What phone number would you like to be contacted at? [phone]  How would you like to obtain your AVS? MyChart    Assessment & Plan     Acute prostatitis with hematuria  Treat with  - doxycycline hyclate (VIBRA-TABS) 100 MG tablet; Take 1 tablet (100 mg) by mouth 2 times daily for 21 days  Follow up in 1 month.            Needs UA/ PSA 1 month    No follow-ups on file.    Alphonso Grover MD  [facility]    Subjective Juan Jose is a 70 year old who presents for the following health issues   Encounter Diagnosis   Name Primary?     Acute prostatitis with hematuria Yes        HPI       Concern - Blood in semen  Onset: noticed the first time 1 month ago  Description: Blood in semen-  Intensity: mild  Progression of Symptoms:  Worsening as has discomfort now  Accompanying Signs & Symptoms: Left groin area discomfort  Previous history of similar problem: Yes and was treated with antibiotics-although there was no blood  Precipitating factors:        Worsened by: None  Alleviating factors:        Improved by: None  Therapies tried and outcome:  Ibuprofen    No trauma, no STI rissk    Review of Systems   Constitutional, HEENT, cardiovascular, pulmonary, gi and gu systems are negative, except as otherwise noted.      Objective           Vitals:  No vitals were obtained today due to virtual visit.    Physical Exam   healthy, alert and no distress  PSYCH: Alert and oriented times 3; coherent speech, normal   rate and volume, able to articulate logical thoughts, able   to abstract reason, no tangential thoughts, no hallucinations   or delusions  His affect is normal  RESP: No cough, no audible wheezing, able to talk in full sentences  Remainder of exam unable to be completed due to telephone visits                Phone call duration: 13 minutes

## 2021-03-22 ENCOUNTER — OFFICE VISIT (OUTPATIENT)
Dept: FAMILY MEDICINE | Facility: CLINIC | Age: 71
End: 2021-03-22
Payer: COMMERCIAL

## 2021-03-22 ENCOUNTER — TELEPHONE (OUTPATIENT)
Dept: FAMILY MEDICINE | Facility: CLINIC | Age: 71
End: 2021-03-22

## 2021-03-22 VITALS
BODY MASS INDEX: 36.75 KG/M2 | SYSTOLIC BLOOD PRESSURE: 136 MMHG | DIASTOLIC BLOOD PRESSURE: 84 MMHG | WEIGHT: 294 LBS | HEART RATE: 88 BPM | TEMPERATURE: 97.2 F | OXYGEN SATURATION: 97 %

## 2021-03-22 DIAGNOSIS — R36.1 BLOOD IN SEMEN: Primary | ICD-10-CM

## 2021-03-22 DIAGNOSIS — Z12.5 ENCOUNTER FOR SCREENING FOR MALIGNANT NEOPLASM OF PROSTATE: ICD-10-CM

## 2021-03-22 DIAGNOSIS — R10.32 ABDOMINAL PAIN, LEFT LOWER QUADRANT: ICD-10-CM

## 2021-03-22 DIAGNOSIS — I10 HYPERTENSION GOAL BP (BLOOD PRESSURE) < 140/90: ICD-10-CM

## 2021-03-22 DIAGNOSIS — R36.1 BLOOD IN SEMEN: ICD-10-CM

## 2021-03-22 LAB
ALBUMIN SERPL-MCNC: 3.9 G/DL (ref 3.4–5)
ALBUMIN UR-MCNC: NEGATIVE MG/DL
ALP SERPL-CCNC: 82 U/L (ref 40–150)
ALT SERPL W P-5'-P-CCNC: 46 U/L (ref 0–70)
ANION GAP SERPL CALCULATED.3IONS-SCNC: 4 MMOL/L (ref 3–14)
APPEARANCE UR: CLEAR
AST SERPL W P-5'-P-CCNC: 30 U/L (ref 0–45)
BILIRUB SERPL-MCNC: 0.8 MG/DL (ref 0.2–1.3)
BILIRUB UR QL STRIP: NEGATIVE
BUN SERPL-MCNC: 13 MG/DL (ref 7–30)
CALCIUM SERPL-MCNC: 8.8 MG/DL (ref 8.5–10.1)
CHLORIDE SERPL-SCNC: 105 MMOL/L (ref 94–109)
CO2 SERPL-SCNC: 28 MMOL/L (ref 20–32)
COLOR UR AUTO: YELLOW
CREAT SERPL-MCNC: 0.99 MG/DL (ref 0.66–1.25)
ERYTHROCYTE [DISTWIDTH] IN BLOOD BY AUTOMATED COUNT: 13.3 % (ref 10–15)
GFR SERPL CREATININE-BSD FRML MDRD: 78 ML/MIN/{1.73_M2}
GLUCOSE SERPL-MCNC: 114 MG/DL (ref 70–99)
GLUCOSE UR STRIP-MCNC: NEGATIVE MG/DL
HCT VFR BLD AUTO: 42.7 % (ref 40–53)
HGB BLD-MCNC: 15.2 G/DL (ref 13.3–17.7)
HGB UR QL STRIP: NEGATIVE
KETONES UR STRIP-MCNC: NEGATIVE MG/DL
LEUKOCYTE ESTERASE UR QL STRIP: NEGATIVE
MCH RBC QN AUTO: 31.3 PG (ref 26.5–33)
MCHC RBC AUTO-ENTMCNC: 35.6 G/DL (ref 31.5–36.5)
MCV RBC AUTO: 88 FL (ref 78–100)
NITRATE UR QL: NEGATIVE
PH UR STRIP: 5.5 PH (ref 5–7)
PLATELET # BLD AUTO: 182 10E9/L (ref 150–450)
POTASSIUM SERPL-SCNC: 4.1 MMOL/L (ref 3.4–5.3)
PROT SERPL-MCNC: 8.4 G/DL (ref 6.8–8.8)
PSA SERPL-ACNC: 1.32 UG/L (ref 0–4)
RBC # BLD AUTO: 4.85 10E12/L (ref 4.4–5.9)
SODIUM SERPL-SCNC: 137 MMOL/L (ref 133–144)
SOURCE: NORMAL
SP GR UR STRIP: 1.01 (ref 1–1.03)
UROBILINOGEN UR STRIP-ACNC: 0.2 EU/DL (ref 0.2–1)
WBC # BLD AUTO: 5.8 10E9/L (ref 4–11)

## 2021-03-22 PROCEDURE — 36415 COLL VENOUS BLD VENIPUNCTURE: CPT | Performed by: FAMILY MEDICINE

## 2021-03-22 PROCEDURE — 87086 URINE CULTURE/COLONY COUNT: CPT | Performed by: FAMILY MEDICINE

## 2021-03-22 PROCEDURE — G0103 PSA SCREENING: HCPCS | Performed by: FAMILY MEDICINE

## 2021-03-22 PROCEDURE — 81003 URINALYSIS AUTO W/O SCOPE: CPT | Performed by: FAMILY MEDICINE

## 2021-03-22 PROCEDURE — 80053 COMPREHEN METABOLIC PANEL: CPT | Performed by: FAMILY MEDICINE

## 2021-03-22 PROCEDURE — 85027 COMPLETE CBC AUTOMATED: CPT | Performed by: FAMILY MEDICINE

## 2021-03-22 PROCEDURE — 99214 OFFICE O/P EST MOD 30 MIN: CPT | Performed by: FAMILY MEDICINE

## 2021-03-22 NOTE — TELEPHONE ENCOUNTER
I called patient  /nl labs   /  Results for orders placed or performed in visit on 03/22/21   **CBC with platelets FUTURE anytime     Status: None   Result Value Ref Range    WBC 5.8 4.0 - 11.0 10e9/L    RBC Count 4.85 4.4 - 5.9 10e12/L    Hemoglobin 15.2 13.3 - 17.7 g/dL    Hematocrit 42.7 40.0 - 53.0 %    MCV 88 78 - 100 fl    MCH 31.3 26.5 - 33.0 pg    MCHC 35.6 31.5 - 36.5 g/dL    RDW 13.3 10.0 - 15.0 %    Platelet Count 182 150 - 450 10e9/L   **Comprehensive metabolic panel FUTURE anytime     Status: Abnormal   Result Value Ref Range    Sodium 137 133 - 144 mmol/L    Potassium 4.1 3.4 - 5.3 mmol/L    Chloride 105 94 - 109 mmol/L    Carbon Dioxide 28 20 - 32 mmol/L    Anion Gap 4 3 - 14 mmol/L    Glucose 114 (H) 70 - 99 mg/dL    Urea Nitrogen 13 7 - 30 mg/dL    Creatinine 0.99 0.66 - 1.25 mg/dL    GFR Estimate 78 >60 mL/min/[1.73_m2]    GFR Estimate If Black >90 >60 mL/min/[1.73_m2]    Calcium 8.8 8.5 - 10.1 mg/dL    Bilirubin Total 0.8 0.2 - 1.3 mg/dL    Albumin 3.9 3.4 - 5.0 g/dL    Protein Total 8.4 6.8 - 8.8 g/dL    Alkaline Phosphatase 82 40 - 150 U/L    ALT 46 0 - 70 U/L    AST 30 0 - 45 U/L   **UA reflex to Microscopic FUTURE anytime     Status: None   Result Value Ref Range    Color Urine Yellow     Appearance Urine Clear     Glucose Urine Negative NEG^Negative mg/dL    Bilirubin Urine Negative NEG^Negative    Ketones Urine Negative NEG^Negative mg/dL    Specific Gravity Urine 1.015 1.003 - 1.035    Blood Urine Negative NEG^Negative    pH Urine 5.5 5.0 - 7.0 pH    Protein Albumin Urine Negative NEG^Negative mg/dL    Urobilinogen Urine 0.2 0.2 - 1.0 EU/dL    Nitrite Urine Negative NEG^Negative    Leukocyte Esterase Urine Negative NEG^Negative    Source Midstream Urine    **Prostate spec antigen screen FUTURE anytime     Status: None   Result Value Ref Range    PSA 1.32 0 - 4 ug/L   Urine Culture Aerobic Bacterial     Status: None (Preliminary result)    Specimen: Midstream Urine   Result Value Ref Range     Specimen Description Midstream Urine     Special Requests Specimen received in preservative     Culture Micro PENDING     he will see urology   if bad pain not batter oi a hemalatha he keshia call me and get CT

## 2021-03-22 NOTE — PROGRESS NOTES
Assessment & Plan     Blood in semen  Not improved with abx  - **Prostate spec antigen screen FUTURE anytime  - **UA reflex to Microscopic FUTURE anytime  - UROLOGY ADULT REFERRAL  - **CBC with platelets FUTURE anytime  - Urine Culture Aerobic Bacterial    Abdominal pain, left lower quadrant  No new  or GI symptoms   - **Comprehensive metabolic panel FUTURE anytime  - **CBC with platelets FUTURE anytime  - Urine Culture Aerobic Bacterial  Consider CT if not better  Encounter for screening for malignant neoplasm of prostate   Sent  Follow up with consultant as planned.   - **Prostate spec antigen screen FUTURE anytime    Hypertension goal BP (blood pressure) < 140/90  Well controlled                Regular exercise  See Patient Instructions    No follow-ups on file.    Alphonso Grover MD  [facility] BRADLY Ingram is a 70 year old who presents for the following health issues     HPI     Concern - Prostate problem  Episodic blood in semen  No STI risk  Onset: 2 months, 8-10 weeks  Description: discomfort on the left side groin abdomin, no pain, comes and goes. Mojica snot know if it is connected and started around the same time.   Intensity: mild  Progression of Symptoms:  same and intermittent  Accompanying Signs & Symptoms: none  Previous history of similar problem: Has the discomfort in the past   Precipitating factors:        Worsened by: lifting or carrying something,, sometimes   Alleviating factors:        Improved by: none   Therapies tried and outcome:  none     Hypertension Follow-up      Do you check your blood pressure regularly outside of the clinic? Yes     Are you following a low salt diet? Yes    Are your blood pressures ever more than 140 on the top number (systolic) OR more   than 90 on the bottom number (diastolic), for example 140/90? No       Review of Systems   Constitutional, HEENT, cardiovascular, pulmonary, gi and gu systems are negative, except as  otherwise noted.      Objective    There were no vitals taken for this visit.  There is no height or weight on file to calculate BMI.  Physical Exam   GENERAL: healthy, alert and no distress  ABDOMEN:mild tenderness LUQ withjout rebound or quarding, no organomegaly or masses and bowel sounds normal  SKIN: no suspicious lesions or rashes  NEURO: Normal strength and tone, mentation intact and speech normal  LYMPH: no cervical, supraclavicular, axillary, or inguinal adenopathy    Office Visit on 10/26/2020   Component Date Value Ref Range Status     Cholesterol 10/26/2020 164  <200 mg/dL Final     Triglycerides 10/26/2020 155* <150 mg/dL Final    Comment: Borderline high:  150-199 mg/dl  High:             200-499 mg/dl  Very high:       >499 mg/dl       HDL Cholesterol 10/26/2020 36* >39 mg/dL Final     LDL Cholesterol Calculated 10/26/2020 97  <100 mg/dL Final    Desirable:       <100 mg/dl     Non HDL Cholesterol 10/26/2020 128  <130 mg/dL Final     Sodium 10/26/2020 135  133 - 144 mmol/L Final     Potassium 10/26/2020 4.2  3.4 - 5.3 mmol/L Final     Chloride 10/26/2020 104  94 - 109 mmol/L Final     Carbon Dioxide 10/26/2020 26  20 - 32 mmol/L Final     Anion Gap 10/26/2020 5  3 - 14 mmol/L Final     Glucose 10/26/2020 94  70 - 99 mg/dL Final     Urea Nitrogen 10/26/2020 13  7 - 30 mg/dL Final     Creatinine 10/26/2020 1.01  0.66 - 1.25 mg/dL Final     GFR Estimate 10/26/2020 76  >60 mL/min/[1.73_m2] Final    Comment: Non  GFR Calc  Starting 12/18/2018, serum creatinine based estimated GFR (eGFR) will be   calculated using the Chronic Kidney Disease Epidemiology Collaboration   (CKD-EPI) equation.       GFR Estimate If Black 10/26/2020 88  >60 mL/min/[1.73_m2] Final    Comment:  GFR Calc  Starting 12/18/2018, serum creatinine based estimated GFR (eGFR) will be   calculated using the Chronic Kidney Disease Epidemiology Collaboration   (CKD-EPI) equation.       Calcium 10/26/2020 9.0   8.5 - 10.1 mg/dL Final     Bilirubin Total 10/26/2020 1.3  0.2 - 1.3 mg/dL Final     Albumin 10/26/2020 4.0  3.4 - 5.0 g/dL Final     Protein Total 10/26/2020 8.6  6.8 - 8.8 g/dL Final     Alkaline Phosphatase 10/26/2020 84  40 - 150 U/L Final     ALT 10/26/2020 39  0 - 70 U/L Final     AST 10/26/2020 33  0 - 45 U/L Final     Creatinine Urine 10/26/2020 88  mg/dL Final     Albumin Urine mg/L 10/26/2020 7  mg/L Final     Albumin Urine mg/g Cr 10/26/2020 8.20  0 - 17 mg/g Cr Final

## 2021-03-23 LAB
BACTERIA SPEC CULT: NO GROWTH
Lab: NORMAL
SPECIMEN SOURCE: NORMAL

## 2021-03-24 NOTE — TELEPHONE ENCOUNTER
MEDICAL RECORDS REQUEST   [facility]  Urology Clinic  [address] [place] [address]  PHONE: [phone]  Fax: [phone]        FUTURE VISIT INFORMATION                                                   RAINER Segura: 1950 scheduled for future visit at [facility]    APPOINTMENT INFORMATION:    Date: 2021    Provider:  Ian HANSEN    Reason for Visit/Diagnosis: Blood in semen    REFERRAL INFORMATION:    Referring provider:  N/A    Specialty: N/A    Referring providers clinic:      Clinic contact number:  N/A    RECORDS REQUESTED FOR VISIT                                                     NOTES  STATUS/DETAILS   OFFICE NOTE from referring provider  yes   OFFICE NOTE from other specialist  no   DISCHARGE SUMMARY from hospital  no   DISCHARGE REPORT from the ER  no   OPERATIVE REPORT  no   MEDICATION LIST  no     PRE-VISIT CHECKLIST      Record collection complete Yes   Appointment appropriately scheduled           (right time/right provider) Yes   MyChart activation Yes   Questionnaire complete If no, please explain: In process      Completed by: Madison Angelo

## 2021-03-26 ENCOUNTER — PRE VISIT (OUTPATIENT)
Dept: UROLOGY | Facility: CLINIC | Age: 71
End: 2021-03-26

## 2021-04-02 ENCOUNTER — VIRTUAL VISIT (OUTPATIENT)
Dept: UROLOGY | Facility: CLINIC | Age: 71
End: 2021-04-02
Payer: COMMERCIAL

## 2021-04-02 ENCOUNTER — PRE VISIT (OUTPATIENT)
Dept: UROLOGY | Facility: CLINIC | Age: 71
End: 2021-04-02

## 2021-04-02 DIAGNOSIS — R36.1 BLOOD IN SEMEN: ICD-10-CM

## 2021-04-02 PROCEDURE — 99203 OFFICE O/P NEW LOW 30 MIN: CPT | Mod: 95 | Performed by: NURSE PRACTITIONER

## 2021-04-02 NOTE — LETTER
4/2/2021       RE: Juan Jose Razo  [address] [address]     Dear Colleague,    Thank you for referring your patient, Juan Jose Razo, to the [facility] UROLOGY CLINIC [place] at [facility]. Please see a copy of my visit note below.    Juan Jose is a 70 year old who is being evaluated via a billable video visit.      Video Visit Technology for this patient: Lars Video Visit- Patient was left in waiting room      How would you like to obtain your AVS? MyChart  If the video visit is dropped, the invitation should be resent by: Send to e-mail at: [email or web address]  Will anyone else be joining your video visit? No    Video Start Time: 8:36 AM  Video-Visit Details    Type of service:  Video Visit    Video End Time: 8:55 AM    Originating Location (pt. Location): Home    Distant Location (provider location):  [facility] UROLOGY CLINIC [place]     Platform used for Video Visit: Lars      Juan Jose Razo complains of   Chief Complaint   Patient presents with     Consult For     Hematospermia         Assessment/Plan:  70 year old male with a 2 month history of hematospermia. Recent UA negative. Has been treated for presumed prostatitis with a 3-week course of doxycycline, but blood remains. No associated dysuria/urethritis symptoms. Given his age and duration of symptoms, will refer to Dr. Corrales for an in-office TRUS for further evaluation.     Thea Sosa, CNP  Department of Urology      Subjective:   70 year old male with a history of obesity and HTN who presents for the evaluation of hematospermia. Per chart review, he was seen by his PCP, Dr. Grover, on 2/22/21 for report of blood in his semen, which he had noticed one month prior. Gradually developed some left groin discomfort. Was treated with a 3-week course of doxycycline for presumed prostatitis. Upon follow up one month later, he reported ongoing blood despite  antibiotics. UA, CBC, and CMP largely unremarkable. PSA 1.32.     Today, he states that the blood continues to be present as of yesterday, and has now been there for ~8-10 weeks. Blood is bright red, although he notes that this has reduced somewhat over the past few weeks. Regarding his groin discomfort, this has resolved.     Hematospermia risk factors:   -Any recent prostate biopsy/procedure, radiation, vasectomy? No  -Any recent genital trauma or vigorous sexual activity? No  -Concern for STI? No  -Urethritis symptoms? No   -Taking blood thinners? No  -Recent travel? No       Objective:     Exam:   Patient is a 70 year old male   General Appearance: Well groomed, hygenic  Respiratory: No cough, no respiratory distress or labored breathing  Musculoskeletal: Grossly normal with no gross deficits  Skin: No discoloration or apparent rashes  Neurologic: No tremors  Psychiatric: Alert and oriented  Further examination is deferred due to the nature of our visit.

## 2021-04-02 NOTE — PATIENT INSTRUCTIONS
UROLOGY CLINIC VISIT PATIENT INSTRUCTIONS    -Follow up for a transrectal ultrasound in the urology office with Dr. Corrales for further evaluation of your prostate.     If you have any issues, questions or concerns in the meantime, do not hesitate to contact us at [phone] or via [employer].     It was a pleasure meeting with you today.  Thank you for allowing me and my team the privilege of caring for you today.  YOU are the reason we are here, and I truly hope we provided you with the excellent service you deserve.  Please let us know if there is anything else we can do for you so that we can be sure you are leaving completely satisfied with your care experience.    Thea Sosa, CNP

## 2021-04-02 NOTE — PROGRESS NOTES
Juan Jose is a 70 year old who is being evaluated via a billable video visit.      Video Visit Technology for this patient: [employer] Video Visit- Patient was left in waiting room      How would you like to obtain your AVS? MyChart  If the video visit is dropped, the invitation should be resent by: Send to e-mail at: [email or web address]  Will anyone else be joining your video visit? No    Video Start Time: 8:36 AM  Video-Visit Details    Type of service:  Video Visit    Video End Time: 8:55 AM    Originating Location (pt. Location): Home    Distant Location (provider location):  [facility] UROLOGY CLINIC [place]     Platform used for Video Visit: Lars Razo complains of   Chief Complaint   Patient presents with     Consult For     Hematospermia         Assessment/Plan:  70 year old male with a 2 month history of hematospermia. Recent UA negative. Has been treated for presumed prostatitis with a 3-week course of doxycycline, but blood remains. No associated dysuria/urethritis symptoms. Given his age and duration of symptoms, will refer to Dr. Corrales for an in-office TRUS for further evaluation.     Thea Sosa CNP  Department of Urology      Subjective:   70 year old male with a history of obesity and HTN who presents for the evaluation of hematospermia. Per chart review, he was seen by his PCP, Dr. Grover, on 2/22/21 for report of blood in his semen, which he had noticed one month prior. Gradually developed some left groin discomfort. Was treated with a 3-week course of doxycycline for presumed prostatitis. Upon follow up one month later, he reported ongoing blood despite antibiotics. UA, CBC, and CMP largely unremarkable. PSA 1.32.     Today, he states that the blood continues to be present as of yesterday, and has now been there for ~8-10 weeks. Blood is bright red, although he notes that this has reduced somewhat over the past few weeks. Regarding his groin discomfort, this has  resolved.     Hematospermia risk factors:   -Any recent prostate biopsy/procedure, radiation, vasectomy? No  -Any recent genital trauma or vigorous sexual activity? No  -Concern for STI? No  -Urethritis symptoms? No   -Taking blood thinners? No  -Recent travel? No       Objective:     Exam:   Patient is a 70 year old male   General Appearance: Well groomed, hygenic  Respiratory: No cough, no respiratory distress or labored breathing  Musculoskeletal: Grossly normal with no gross deficits  Skin: No discoloration or apparent rashes  Neurologic: No tremors  Psychiatric: Alert and oriented  Further examination is deferred due to the nature of our visit.

## 2021-04-05 ENCOUNTER — TELEPHONE (OUTPATIENT)
Dept: UROLOGY | Facility: CLINIC | Age: 71
End: 2021-04-05

## 2021-04-05 NOTE — TELEPHONE ENCOUNTER
LVM for patient to Follow up for a transrectal ultrasound in the urology office with Dr. Corrales for further evaluation of your prostate.

## 2021-04-19 DIAGNOSIS — I10 HYPERTENSION GOAL BP (BLOOD PRESSURE) < 140/90: ICD-10-CM

## 2021-04-19 RX ORDER — METOPROLOL SUCCINATE 100 MG/1
100 TABLET, EXTENDED RELEASE ORAL DAILY
Qty: 90 TABLET | Refills: 3 | Status: SHIPPED | OUTPATIENT
Start: 2021-04-19 | End: 2022-04-19

## 2021-04-20 NOTE — TELEPHONE ENCOUNTER
"Requested Prescriptions   Pending Prescriptions Disp Refills     metoprolol succinate ER (TOPROL-XL) 100 MG 24 hr tablet [Pharmacy Med Name: METOPROLOL SUCCINATE ER 100MG TB24] 90 tablet 0     Sig: TAKE 1 TABLET (100 MG) BY MOUTH DAILY DUE FOR OFFICE VISIT CALL  TO SET THIS UP       Beta-Blockers Protocol Passed - 4/19/2021  7:53 AM        Passed - Blood pressure under 140/90 in past 12 months     BP Readings from Last 3 Encounters:   03/22/21 136/84   10/26/20 136/88   12/03/19 123/83                 Passed - Patient is age 6 or older        Passed - Recent (12 mo) or future (30 days) visit within the authorizing provider's specialty     Patient has had an office visit with the authorizing provider or a provider within the authorizing providers department within the previous 12 mos or has a future within next 30 days. See \"Patient Info\" tab in inbasket, or \"Choose Columns\" in Meds & Orders section of the refill encounter.              Passed - Medication is active on med list           Signed Prescriptions:                        Disp   Refills    metoprolol succinate ER (TOPROL-XL) 100 MG*90 tab*3        Sig: Take 1 tablet (100 mg) by mouth daily  Authorizing Provider: GOSIA PIMENTEL  Ordering User: SAMEER HOSKINS      "

## 2021-05-13 ENCOUNTER — PRE VISIT (OUTPATIENT)
Dept: UROLOGY | Facility: CLINIC | Age: 71
End: 2021-05-13

## 2021-05-13 NOTE — TELEPHONE ENCOUNTER
Reason for visit: Trans-rectal ultrasound     Relevant information: hx pf hematospermia    Records/imaging/labs/orders: in EPIC    Pt called: no    At Rooming: normal

## 2021-05-27 ENCOUNTER — OFFICE VISIT (OUTPATIENT)
Dept: UROLOGY | Facility: CLINIC | Age: 71
End: 2021-05-27
Payer: COMMERCIAL

## 2021-05-27 VITALS
HEART RATE: 86 BPM | SYSTOLIC BLOOD PRESSURE: 148 MMHG | WEIGHT: 280 LBS | DIASTOLIC BLOOD PRESSURE: 89 MMHG | BODY MASS INDEX: 34.82 KG/M2 | HEIGHT: 75 IN

## 2021-05-27 DIAGNOSIS — R36.1 HEMATOSPERMIA: Primary | ICD-10-CM

## 2021-05-27 PROCEDURE — 76872 US TRANSRECTAL: CPT | Performed by: UROLOGY

## 2021-05-27 ASSESSMENT — PAIN SCALES - GENERAL: PAINLEVEL: NO PAIN (0)

## 2021-05-27 ASSESSMENT — MIFFLIN-ST. JEOR: SCORE: 2125.7

## 2021-05-27 NOTE — LETTER
5/27/2021       RE: Juan Jose Razo  [address] [address]     Dear Colleague,    Thank you for referring your patient, Juan Jose Razo, to the [facility] UROLOGY CLINIC [place] at [facility]. Please see a copy of my visit note below.    Procedure Note    Pre-operative diagnosis: Hematospermia     Post-operative diagnosis Same    Procedure: Trans-rectal ultrasound of the prostate   Surgeon: Gary Corrales MD   Assistants(s): None    Estimated blood loss: none    Specimens: None   Findings: As below     Juan Jose Razo is a 70 year old male  is in for his TRUS of the prostate.  Reason for the ultrasound is:    Component      Latest Ref Rng & Units 3/22/2021   Color Urine       Yellow   Appearance Urine       Clear   Glucose Urine      NEG:Negative mg/dL Negative   Bilirubin Urine      NEG:Negative Negative   Ketones Urine      NEG:Negative mg/dL Negative   Specific Gravity Urine      1.003 - 1.035 1.015   Blood Urine      NEG:Negative Negative   pH Urine      5.0 - 7.0 pH 5.5   Protein Albumin Urine      NEG:Negative mg/dL Negative   Urobilinogen Urine      0.2 - 1.0 EU/dL 0.2   Nitrite Urine      NEG:Negative Negative   Leukocyte Esterase Urine      NEG:Negative Negative   Source       Midstream Urine   Specimen Description       Midstream Urine   Special Requests       Specimen received in preservative   Culture Micro       No growth   PSA      0 - 4 ug/L 1.32       Procedure:  Pt was positioned in left lateral decubitus position.   JOBY indicates a smooth, symmetrical prostate.   The trans-rectal ultrasound probe was inserted and the prostate examined with biplanar ultrasound.    His prostate measures approx. 64.0 cc.  Capsule: Distinct   SVs:  Normal   Calcifications: mild, at the junction of the PZ and TZ  Hypoechoic areas: none significant.  No cysts.      No complications were noted, he tolerated the procedure  well.      Assessment-  Hematospermia, he feels symptoms have resolved.  Normal UA and PSA.  Prostate looks normal on US, other than some age-related changes.  No evidence of prostate cyst or ejaculatory duct obstruction.    Plan-  - Observation recommended.    - recommended repeat PSA in a year with PCP     Again, thank you for allowing me to participate in the care of your patient.      Sincerely,    Gary Corrales MD

## 2021-05-27 NOTE — PROGRESS NOTES
Procedure Note    Pre-operative diagnosis: Hematospermia     Post-operative diagnosis Same    Procedure: Trans-rectal ultrasound of the prostate   Surgeon: Gary Corrales MD   Assistants(s): None    Estimated blood loss: none    Specimens: None   Findings: As below     Juan Jose Razo is a 70 year old male  is in for his TRUS of the prostate.  Reason for the ultrasound is:    Component      Latest Ref Rng & Units 3/22/2021   Color Urine       Yellow   Appearance Urine       Clear   Glucose Urine      NEG:Negative mg/dL Negative   Bilirubin Urine      NEG:Negative Negative   Ketones Urine      NEG:Negative mg/dL Negative   Specific Gravity Urine      1.003 - 1.035 1.015   Blood Urine      NEG:Negative Negative   pH Urine      5.0 - 7.0 pH 5.5   Protein Albumin Urine      NEG:Negative mg/dL Negative   Urobilinogen Urine      0.2 - 1.0 EU/dL 0.2   Nitrite Urine      NEG:Negative Negative   Leukocyte Esterase Urine      NEG:Negative Negative   Source       Midstream Urine   Specimen Description       Midstream Urine   Special Requests       Specimen received in preservative   Culture Micro       No growth   PSA      0 - 4 ug/L 1.32       Procedure:  Pt was positioned in left lateral decubitus position.   JOBY indicates a smooth, symmetrical prostate.   The trans-rectal ultrasound probe was inserted and the prostate examined with biplanar ultrasound.    His prostate measures approx. 64.0 cc.  Capsule: Distinct   SVs:  Normal   Calcifications: mild, at the junction of the PZ and TZ  Hypoechoic areas: none significant.  No cysts.      No complications were noted, he tolerated the procedure well.      Assessment-  Hematospermia, he feels symptoms have resolved.  Normal UA and PSA.  Prostate looks normal on US, other than some age-related changes.  No evidence of prostate cyst or ejaculatory duct obstruction.    Plan-  - Observation recommended.    - recommended repeat PSA in a year with PCP

## 2021-05-27 NOTE — NURSING NOTE
"Chief Complaint   Patient presents with     Follow Up     hematospermia       Blood pressure (!) 148/89, pulse 86, height 1.905 m (6' 3\"), weight 127 kg (280 lb). Body mass index is 35 kg/m .    Patient Active Problem List   Diagnosis     CARDIOVASCULAR SCREENING; LDL GOAL LESS THAN 130     Hypertension goal BP (blood pressure) < 140/90     GERD (gastroesophageal reflux disease)     Seasonal allergic rhinitis     Advanced directives, counseling/discussion     Skin exam, screening for cancer     Multiple benign nevi     Seborrheic keratoses     Skin cancer screening     Morbid obesity (H)       No Known Allergies    Current Outpatient Medications   Medication Sig Dispense Refill     amLODIPine (NORVASC) 5 MG tablet TAKE  2 TABLETS BY MOUTH ONCE DAILY 180 tablet 3     cholecalciferol (VITAMIN D3) 1000 UNIT tablet Take by mouth daily       FAMOTIDINE 20 MG PO tablet TAKE TWO TABLETS BY MOUTH ONCE DAILY 60 tablet 0     metoprolol succinate ER (TOPROL-XL) 100 MG 24 hr tablet Take 1 tablet (100 mg) by mouth daily 90 tablet 3     Multiple Vitamins-Minerals (CENTRUM SILVER ULTRA MENS PO)        nystatin (MYCOSTATIN) 821413 UNIT/GM external cream Apply topically 2 times daily Mix with steroid twice daily during flare of rash until resolved. 60 g 1     omeprazole (PRILOSEC) 40 MG DR capsule TAKE ONE CAPSULE BY MOUTH ONCE DAILY 90 capsule 3     sildenafil (REVATIO) 20 MG tablet Take 2-3 as needed for ED 20 tablet 0     triamcinolone (KENALOG) 0.025 % external ointment Apply topically 2 times daily To groin rash until resolved. 80 g 3       Social History     Tobacco Use     Smoking status: Never Smoker     Smokeless tobacco: Never Used   Substance Use Topics     Alcohol use: Yes     Comment: 1-2 beers a week      Drug use: No       Jose Sellers EMT  5/27/2021  7:57 AM  "

## 2021-05-27 NOTE — PATIENT INSTRUCTIONS
Please follow up with urology as needed. Get a PSA with your primary doctor in about a year.    It was a pleasure meeting with you today.  Thank you for allowing me and my team the privilege of caring for you today.  YOU are the reason we are here, and I truly hope we provided you with the excellent service you deserve.  Please let us know if there is anything else we can do for you so that we can be sure you are leaving completely satisfied with your care experience.      Micheal Sellers EMT

## 2021-08-11 ENCOUNTER — OFFICE VISIT (OUTPATIENT)
Dept: DERMATOLOGY | Facility: CLINIC | Age: 71
End: 2021-08-11
Payer: COMMERCIAL

## 2021-08-11 DIAGNOSIS — D22.9 MULTIPLE BENIGN NEVI: ICD-10-CM

## 2021-08-11 DIAGNOSIS — Z12.83 SKIN CANCER SCREENING: Primary | ICD-10-CM

## 2021-08-11 DIAGNOSIS — L82.1 SEBORRHEIC KERATOSIS: ICD-10-CM

## 2021-08-11 DIAGNOSIS — Z85.89 HISTORY OF SQUAMOUS CELL CARCINOMA: ICD-10-CM

## 2021-08-11 DIAGNOSIS — L30.4 INTERTRIGO: ICD-10-CM

## 2021-08-11 DIAGNOSIS — L82.0 INFLAMED SEBORRHEIC KERATOSIS: ICD-10-CM

## 2021-08-11 DIAGNOSIS — D48.9 NEOPLASM OF UNCERTAIN BEHAVIOR: ICD-10-CM

## 2021-08-11 PROCEDURE — 11102 TANGNTL BX SKIN SINGLE LES: CPT | Mod: XS | Performed by: PHYSICIAN ASSISTANT

## 2021-08-11 PROCEDURE — 99213 OFFICE O/P EST LOW 20 MIN: CPT | Mod: 25 | Performed by: PHYSICIAN ASSISTANT

## 2021-08-11 PROCEDURE — 88305 TISSUE EXAM BY PATHOLOGIST: CPT | Performed by: PATHOLOGY

## 2021-08-11 PROCEDURE — 17110 DESTRUCTION B9 LES UP TO 14: CPT | Performed by: PHYSICIAN ASSISTANT

## 2021-08-11 RX ORDER — NYSTATIN 100000 U/G
CREAM TOPICAL
Qty: 60 G | Refills: 1 | Status: SHIPPED | OUTPATIENT
Start: 2021-08-11 | End: 2022-02-14

## 2021-08-11 RX ORDER — TRIAMCINOLONE ACETONIDE 0.25 MG/G
OINTMENT TOPICAL 2 TIMES DAILY
Qty: 80 G | Refills: 3 | Status: SHIPPED | OUTPATIENT
Start: 2021-08-11 | End: 2022-02-14

## 2021-08-11 ASSESSMENT — PAIN SCALES - GENERAL: PAINLEVEL: NO PAIN (0)

## 2021-08-11 NOTE — PROGRESS NOTES
[facility] Dermatology Note  Encounter Date: Aug 11, 2021  Office Visit     Dermatology Problem List:  #. SCC  - R nasal ala s/p MMS 9/24/2018  - Left temple s/p MMS 10/8/15  #. Hx of dysplastic nevus, pt reported  #. Onychomycosis  #. AKs  #. 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 Intertrigo in groin  - s/p, hydrocortisone 2.5% ointment  triamcinolone 0.025% ointment, nystatin cream  #. 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 Benign bx:  - SK, Left parietal scalp, 9/12/2018  #. Skin cancer screening 8/11/2021   #. ISK s/p cryo 8/11/2021  #. NUB, right upper helix  - shave biopsy 8/11/2021   # NUB left index finger, differential diagnosis fibrokeratoma.  Clinically monitoring  ____________________________________________    Assessment & Plan:    #. NUB, right upper helix. Ddx hypertrophic AK vs SCC vs other  - Shave biopsy performed, see note below    #. Seborrheic keratosis, inflamed/irritated. Benign nature discussed. Given symptomatic nature of lesion on left preauricular cheek, will treat with cryo today.  - Cryotherapy performed, see note below    #. History of SCC on left temple and right nasal ala  Scarring noted on the right nasal ala.   - No evidence of recurrence on left temple or nasal ala. Continue regular skin checks, will resume checks every 6 months if he is developing more areas within the next year.   - Sunscreen: Apply 20 minutes prior to going outdoors and reapply every two hours, when wet or sweating. We recommend using an SPF 30 or higher, and to use one that is water resistant.      #. History of Intertrigo of the groin, no active eruption today  - Continue triamcinolone 0.025% ointment, apply BID when flaring  - Continue Nystatin cream BID mixed with topical steroid and apply BID when flaring    #. Multiple benign nevi.   - No concerning lesions today  - Counseled on ABCDEs of melanoma and sun protection - recommend SPF 30  or higher with frequent application   - Return sooner if noticing changing or symptomatic lesions     #. Solar lentigines, sun exposed skin  Discussed the natural history and benign nature of this lesion. Reassurance provided that no additional treatment is necessary.      # NUB left index finger, ddx fibrokeratoma.   Pt defers intervention at this time but if worsening sx will contact the dermatology department for potential excision in dermatology surgery.     Procedures Performed:   - Cryotherapy procedure note, location(s): see above. After verbal consent and discussion of risks and benefits including, but not limited to, dyspigmentation/scar, blister, and pain, 1 lesion(s) was(were) treated with 1-2 mm freeze border for 1-2 cycles with liquid nitrogen. Post cryotherapy instructions were provided.    - Shave biopsy procedure note, location(s): see above. After discussion of benefits and risks including but not limited to bleeding, infection, scar, incomplete removal, recurrence, and non-diagnostic biopsy, written consent and photographs were obtained. The area was cleaned with isopropyl alcohol. 0.5mL of 1% lidocaine with epinephrine was injected to obtain adequate anesthesia of lesion(s). Shave biopsy at site(s) performed. Hemostasis was achieved with aluminium chloride. Petrolatum ointment and a sterile dressing were applied. The patient tolerated the procedure and no complications were noted. The patient was provided with verbal and written post care instructions.     Follow-up: 6 months or sooner as needed     Staff and Scribe:     Provider Disclosure:   The documentation recorded by the scribe accurately reflects the services I personally performed and the decisions made by me.    All risks, benefits and alternatives were discussed with patient.  Patient is in agreement and understands the assessment and plan.  All questions were answered.  Sun Screen Education was given.   Return to Clinic in 6 months or  sooner as needed.   Imelda Rincon PA-C   [facility]     Scribe Disclosure:  I, Karishma Abarca, am serving as a scribe to document services personally performed by Imelda Rincon PA-C based on data collection and the provider's statements to me.   ____________________________________________    CC: Derm Problem (Here for follow up on skin and lesion of concern on right ear today)      HPI:  Mr. Juan Jose Razo is a(n) 70 year old male who presents today as a return patient for follow up.    The patient was last seen by me on 12/21/2020 for a skin exam, at which time he was advised to continue triamcinolone 0.025% ointment and Nystatin cream for treatment of intertrigo flares. Cryotherapy was also performed for treatment of an AK on his forehead.     Today, the patient notes a spot on his left ear that has been present for several months, bleeds easily, and will not heal. He states that he regularly bumps the area when putting on a mask, which causes bleeding and discomfort. He also reports a spot on his left cheek which is itchy and gets irritated by face masks as well.     The patient states his intertrigo has not been flaring often so he has not had to use the triamcinolone and Nystatin very often, but he feels that this regimen is effective when needed.    Lastly, the patient reports a growth protruding from his left index finger appearing several years ago which he pulled out himself. After doing so, he states fluid accumulated in the area before self-resolving, however the spot has now reappeared recently. He denies associated symptoms.     Patient is otherwise feeling well, without additional skin concerns.    Labs Reviewed:  N/A    Physical Exam:  Vitals: There were no vitals taken for this visit.  SKIN: Total skin excluding the undergarment areas was performed. The exam included the head/face, neck, both arms, chest, back, abdomen, both legs, digits and/or nails.    - Right upper helix pink mildly tender thin papule with overlying hyperkeratotic scale  - There is a tan to brown waxy stuck on papule with surrounding erythema on the left preauricular cheek.   - Scattered brown macules on sun exposed areas.  - There are waxy stuck on tan to brown papules on the trunk and extremities.   -There is a faint pearly papule at the proximal nail border and an associated groove at this site extending the length of the nail plate.   - No other lesions of concern on areas examined.     Medications:  Current Outpatient Medications   Medication     amLODIPine (NORVASC) 5 MG tablet     cholecalciferol (VITAMIN D3) 1000 UNIT tablet     FAMOTIDINE 20 MG PO tablet     metoprolol succinate ER (TOPROL-XL) 100 MG 24 hr tablet     Multiple Vitamins-Minerals (CENTRUM SILVER ULTRA MENS PO)     nystatin (MYCOSTATIN) 716387 UNIT/GM external cream     omeprazole (PRILOSEC) 40 MG DR capsule     sildenafil (REVATIO) 20 MG tablet     triamcinolone (KENALOG) 0.025 % external ointment     No current facility-administered medications for this visit.        Past Medical History:   Patient Active Problem List   Diagnosis     CARDIOVASCULAR SCREENING; LDL GOAL LESS THAN 130     Hypertension goal BP (blood pressure) < 140/90     GERD (gastroesophageal reflux disease)     Seasonal allergic rhinitis     Advanced directives, counseling/discussion     Skin exam, screening for cancer     Multiple benign nevi     Seborrheic keratoses     Skin cancer screening     Morbid obesity (H)     Past Medical History:   Diagnosis Date     Acid reflux      Allergies      HTN (hypertension) 1990     Squamous cell carcinoma      Varicose veins         CC Alphonso Grover MD  [address]  [place] [address] on close of this encounter.

## 2021-08-11 NOTE — LETTER
8/11/2021       RE: Juan Jose Razo  [address] [facility] [id number]     Dear Colleague,    Thank you for referring your patient, Juan Jose Razo, to the [facility] DERMATOLOGY CLINIC [place] at [facility]. Please see a copy of my visit note below.    [facility] Dermatology Note  Encounter Date: Aug 11, 2021  Office Visit     Dermatology Problem List:  #. SCC  - R nasal ala s/p MMS 9/24/2018  - Left temple s/p MMS 10/8/15  #. Hx of dysplastic nevus, pt reported  #. Onychomycosis  #. AKs  #. 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 Intertrigo in groin  - s/p, hydrocortisone 2.5% ointment  triamcinolone 0.025% ointment, nystatin cream  #. 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 Benign bx:  - SK, Left parietal scalp, 9/12/2018  #. Skin cancer screening 8/11/2021   #. ISK s/p cryo 8/11/2021  #. NUB, right upper helix  - shave biopsy 8/11/2021   # NUB left index finger, differential diagnosis fibrokeratoma.  Clinically monitoring  ____________________________________________    Assessment & Plan:    #. NUB, right upper helix. Ddx hypertrophic AK vs SCC vs other  - Shave biopsy performed, see note below    #. Seborrheic keratosis, inflamed/irritated. Benign nature discussed. Given symptomatic nature of lesion on left preauricular cheek, will treat with cryo today.  - Cryotherapy performed, see note below    #. History of SCC on left temple and right nasal ala  Scarring noted on the right nasal ala.   - No evidence of recurrence on left temple or nasal ala. Continue regular skin checks, will resume checks every 6 months if he is developing more areas within the next year.   - Sunscreen: Apply 20 minutes prior to going outdoors and reapply every two hours, when wet or sweating. We recommend using an SPF 30 or higher, and to use one that is water resistant.      #. History of Intertrigo of  the groin, no active eruption today  - Continue triamcinolone 0.025% ointment, apply BID when flaring  - Continue Nystatin cream BID mixed with topical steroid and apply BID when flaring    #. Multiple benign nevi.   - No concerning lesions today  - Counseled on ABCDEs of melanoma and sun protection - recommend SPF 30 or higher with frequent application   - Return sooner if noticing changing or symptomatic lesions     #. Solar lentigines, sun exposed skin  Discussed the natural history and benign nature of this lesion. Reassurance provided that no additional treatment is necessary.      # NUB left index finger, ddx fibrokeratoma.   Pt defers intervention at this time but if worsening sx will contact the dermatology department for potential excision in dermatology surgery.     Procedures Performed:   - Cryotherapy procedure note, location(s): see above. After verbal consent and discussion of risks and benefits including, but not limited to, dyspigmentation/scar, blister, and pain, 1 lesion(s) was(were) treated with 1-2 mm freeze border for 1-2 cycles with liquid nitrogen. Post cryotherapy instructions were provided.    - Shave biopsy procedure note, location(s): see above. After discussion of benefits and risks including but not limited to bleeding, infection, scar, incomplete removal, recurrence, and non-diagnostic biopsy, written consent and photographs were obtained. The area was cleaned with isopropyl alcohol. 0.5mL of 1% lidocaine with epinephrine was injected to obtain adequate anesthesia of lesion(s). Shave biopsy at site(s) performed. Hemostasis was achieved with aluminium chloride. Petrolatum ointment and a sterile dressing were applied. The patient tolerated the procedure and no complications were noted. The patient was provided with verbal and written post care instructions.     Follow-up: 6 months or sooner as needed     Staff and Scribe:     Provider Disclosure:   The documentation recorded by the scribe  accurately reflects the services I personally performed and the decisions made by me.    All risks, benefits and alternatives were discussed with patient.  Patient is in agreement and understands the assessment and plan.  All questions were answered.  Sun Screen Education was given.   Return to Clinic in 6 months or sooner as needed.   Imelda Rincon PA-C   [facility]     Scribe Disclosure:  I, Karishma Tevin, am serving as a scribe to document services personally performed by Imelda Rincon PA-C based on data collection and the provider's statements to me.   ____________________________________________    CC: Derm Problem (Here for follow up on skin and lesion of concern on right ear today)      HPI:  Mr. Juan Jose Razo is a(n) 70 year old male who presents today as a return patient for follow up.    The patient was last seen by me on 12/21/2020 for a skin exam, at which time he was advised to continue triamcinolone 0.025% ointment and Nystatin cream for treatment of intertrigo flares. Cryotherapy was also performed for treatment of an AK on his forehead.     Today, the patient notes a spot on his left ear that has been present for several months, bleeds easily, and will not heal. He states that he regularly bumps the area when putting on a mask, which causes bleeding and discomfort. He also reports a spot on his left cheek which is itchy and gets irritated by face masks as well.     The patient states his intertrigo has not been flaring often so he has not had to use the triamcinolone and Nystatin very often, but he feels that this regimen is effective when needed.    Lastly, the patient reports a growth protruding from his left index finger appearing several years ago which he pulled out himself. After doing so, he states fluid accumulated in the area before self-resolving, however the spot has now reappeared recently. He denies associated symptoms.     Patient is  otherwise feeling well, without additional skin concerns.    Labs Reviewed:  N/A    Physical Exam:  Vitals: There were no vitals taken for this visit.  SKIN: Total skin excluding the undergarment areas was performed. The exam included the head/face, neck, both arms, chest, back, abdomen, both legs, digits and/or nails.   - Right upper helix pink mildly tender thin papule with overlying hyperkeratotic scale  - There is a tan to brown waxy stuck on papule with surrounding erythema on the left preauricular cheek.   - Scattered brown macules on sun exposed areas.  - There are waxy stuck on tan to brown papules on the trunk and extremities.   -There is a faint pearly papule at the proximal nail border and an associated groove at this site extending the length of the nail plate.   - No other lesions of concern on areas examined.     Medications:  Current Outpatient Medications   Medication     amLODIPine (NORVASC) 5 MG tablet     cholecalciferol (VITAMIN D3) 1000 UNIT tablet     FAMOTIDINE 20 MG PO tablet     metoprolol succinate ER (TOPROL-XL) 100 MG 24 hr tablet     Multiple Vitamins-Minerals (CENTRUM SILVER ULTRA MENS PO)     nystatin (MYCOSTATIN) 066935 UNIT/GM external cream     omeprazole (PRILOSEC) 40 MG DR capsule     sildenafil (REVATIO) 20 MG tablet     triamcinolone (KENALOG) 0.025 % external ointment     No current facility-administered medications for this visit.        Past Medical History:   Patient Active Problem List   Diagnosis     CARDIOVASCULAR SCREENING; LDL GOAL LESS THAN 130     Hypertension goal BP (blood pressure) < 140/90     GERD (gastroesophageal reflux disease)     Seasonal allergic rhinitis     Advanced directives, counseling/discussion     Skin exam, screening for cancer     Multiple benign nevi     Seborrheic keratoses     Skin cancer screening     Morbid obesity (H)     Past Medical History:   Diagnosis Date     Acid reflux      Allergies      HTN (hypertension) 1990     Squamous cell  carcinoma      Varicose veins         CC Alphonso Grover MD  [address]  [place] [address] on close of this encounter.

## 2021-08-11 NOTE — NURSING NOTE
Dermatology Rooming Note    Juan Jose Razo's goals for this visit include:   Chief Complaint   Patient presents with     Derm Problem     Here for follow up on skin and lesion of concern on right ear today     Kirsten Burgos RN

## 2021-08-11 NOTE — PATIENT INSTRUCTIONS
Cryotherapy    What is it?    Use of a very cold liquid, such as liquid nitrogen, to freeze and destroy abnormal skin cells that need to be removed    What should I expect?    Tenderness and redness    A small blister that might grow and fill with dark purple blood. There may be crusting.    More than one treatment may be needed if the lesions do not go away.    How do I care for the treated area?    Gently wash the area with your hands when bathing.    Use a thin layer of Vaseline to help with healing. You may use a Band-Aid.     The area should heal within 7-10 days and may leave behind a pink or lighter color.     Do not use an antibiotic or Neosporin ointment.     You may take acetaminophen (Tylenol) for pain.     Call your doctor if you have:    Severe pain    Signs of infection (warmth, redness, cloudy yellow drainage, and or a bad smell)    Questions or concerns    Who should I call with questions?       [facility]: [phone]       [facility]: [phone]       For urgent needs outside of business hours call the [facility] at [phone] and ask for the dermatology resident on call    Wound Care After a Biopsy    What is a skin biopsy?  A skin biopsy allows the doctor to examine a very small piece of tissue under the microscope to determine the diagnosis and the best treatment for the skin condition. A local anesthetic (numbing medicine)  is injected with a very small needle into the skin area to be tested. A small piece of skin is taken from the area. Sometimes a suture (stitch) is used.     What are the risks of a skin biopsy?  I will experience scar, bleeding, swelling, pain, crusting and redness. I may experience incomplete removal or recurrence. Risks of this procedure are excessive bleeding, bruising, infection, nerve damage, numbness, thick (hypertrophic or keloidal) scar and non-diagnostic biopsy.    How should I care for my  wound for the first 24 hours?    Keep the wound dry and covered for 24 hours    If it bleeds, hold direct pressure on the area for 15 minutes. If bleeding does not stop then go to the emergency room    Avoid strenuous exercise the first 1-2 days or as your doctor instructs you    How should I care for the wound after 24 hours?    After 24 hours, remove the bandage    You may bathe or shower as normal    If you had a scalp biopsy, you can shampoo as usual and can use shower water to clean the biopsy site daily    Clean the wound twice a day with gentle soap and water    Do not scrub, be gentle    Apply white petroleum/Vaseline after cleaning the wound with a cotton swab or a clean finger, and keep the site covered with a Bandaid /bandage. Bandages are not necessary with a scalp biopsy    If you are unable to cover the site with a Bandaid /bandage, re-apply ointment 2-3 times a day to keep the site moist. Moisture will help with healing    Avoid strenuous activity for first 1-2 days    Avoid lakes, rivers, pools, and oceans until the stitches are removed or the site is healed    How do I clean my wound?    Wash hands thoroughly with soap or use hand  before all wound care    Clean the wound with gentle soap and water    Apply white petroleum/Vaseline  to wound after it is clean    Replace the Bandaid /bandage to keep the wound covered for the first few days or as instructed by your doctor    If you had a scalp biopsy, warm shower water to the area on a daily basis should suffice    What should I use to clean my wound?     Cotton-tipped applicators (Qtips )    White petroleum jelly (Vaseline ). Use a clean new container and use Q-tips to apply.    Bandaids   as needed    Gentle soap     How should I care for my wound long term?    Do not get your wound dirty    Keep up with wound care for one week or until the area is healed.    A small scab will form and fall off by itself when the area is completely healed.  The area will be red and will become pink in color as it heals. Sun protection is very important for how your scar will turn out. Sunscreen with an SPF 30 or greater is recommended once the area is healed.    You should have some soreness but it should be mild and slowly go away over several days. Talk to your doctor about using tylenol for pain,    When should I call my doctor?  If you have increased:     Pain or swelling    Pus or drainage (clear or slightly yellow drainage is ok)    Temperature over 100F    Spreading redness or warmth around wound    When will I hear about my results?  The biopsy results can take 2 weeks to come back.  Your results will automatically release to [facility] before your provider has even reviewed them.  The clinic will call you with the results, send you a [employer] message, or have you schedule a follow-up clinic or phone time to discuss the results.  Contact our clinics if you do not hear from us in 2 weeks.    Who should I call with questions?    [facility]: [phone]    [facility]: [phone]    For urgent needs outside of business hours call the [facility] at [phone] and ask for the dermatology resident on call

## 2021-08-13 LAB
PATH REPORT.COMMENTS IMP SPEC: NORMAL
PATH REPORT.FINAL DX SPEC: NORMAL
PATH REPORT.GROSS SPEC: NORMAL
PATH REPORT.MICROSCOPIC SPEC OTHER STN: NORMAL
PATH REPORT.RELEVANT HX SPEC: NORMAL

## 2021-08-16 ENCOUNTER — TELEPHONE (OUTPATIENT)
Dept: DERMATOLOGY | Facility: CLINIC | Age: 71
End: 2021-08-16

## 2021-08-16 DIAGNOSIS — C44.92 SCC (SQUAMOUS CELL CARCINOMA): Primary | ICD-10-CM

## 2021-08-16 NOTE — TELEPHONE ENCOUNTER
Emma Holloway CMA   8/16/2021 11:10 AM CDT Back to Top      [employer] message sent. Referral placed.     Emma Holloway CMA on 8/16/2021 at 11:09 AM    Imelda Rincon PA-C   8/15/2021  9:45 PM CDT       Squamous cell insitu, at least, deep to the margin. Recommend Mohs surgery, Please place a referral for dermatology surgery.     Hi Mr Goodwin. The spot on your ear is squamous cell carcinoma. It needs to be further removed with Moh's surgery. A referral will be placed and you will be called to have this scheduled.   Written by Imelda Rincon PA-C on 8/15/2021  9:45 PM CDT      Emma Holloway CMA on 8/16/2021 at 11:10 AM

## 2021-08-18 NOTE — TELEPHONE ENCOUNTER
FUTURE VISIT INFORMATION      FUTURE VISIT INFORMATION:    Date: 8.19.21    Time: 8:30 AM    Location: [facility]  REFERRAL INFORMATION:    Referring provider:  Sergey    Referring providers clinic:  [facility]    Reason for visit/diagnosis  SCC on right upper helix    RECORDS REQUESTED FROM:       Clinic name Comments Records Status Imaging Status   [facility] 8.16.21 Sergey  8.11.21 Dermatolopathology  EPIC

## 2021-08-19 ENCOUNTER — OFFICE VISIT (OUTPATIENT)
Dept: DERMATOLOGY | Facility: CLINIC | Age: 71
End: 2021-08-19
Payer: COMMERCIAL

## 2021-08-19 ENCOUNTER — PRE VISIT (OUTPATIENT)
Dept: DERMATOLOGY | Facility: CLINIC | Age: 71
End: 2021-08-19

## 2021-08-19 VITALS — HEART RATE: 88 BPM | DIASTOLIC BLOOD PRESSURE: 79 MMHG | SYSTOLIC BLOOD PRESSURE: 125 MMHG

## 2021-08-19 DIAGNOSIS — C44.222 SQUAMOUS CELL CANCER OF SKIN OF HELIX IN RIGHT EAR: ICD-10-CM

## 2021-08-19 PROCEDURE — 14061 TIS TRNFR E/N/E/L10.1-30SQCM: CPT | Mod: GC | Performed by: DERMATOLOGY

## 2021-08-19 PROCEDURE — 17311 MOHS 1 STAGE H/N/HF/G: CPT | Mod: GC | Performed by: DERMATOLOGY

## 2021-08-19 ASSESSMENT — PAIN SCALES - GENERAL: PAINLEVEL: NO PAIN (0)

## 2021-08-19 NOTE — PATIENT INSTRUCTIONS
Wound Care Instructions  I will experience scar, altered skin color, bleeding, swelling, pain, crusting and redness. I may experience altered sensation. Risks are excessive bleeding, infection, muscle weakness, thick (hypertrophic or keloidal) scar, and recurrence,. A second procedure may be recommended to obtain the best cosmetic or functional result.  Possible complications of any surgical procedure are bleeding, infection, scarring, alteration in skin color and sensation, muscle weakness in the area, wound dehiscence or seperation, or recurrence of the lesion or disease. On occasion, after healing, a secondary procedure or revision may be recommended in order to obtain the best cosmetic or functional result.   After your surgery, a pressure bandage will be placed over the area that has sutures. This will help prevent bleeding.   For the First 48 hours After Surgery:  1. Leave the pressure bandage on and keep it dry. If it should come loose, you may retape it, but do not take it off.  2. Relax and take it easy. Do not do any vigorous exercise, heavy lifting, or bending forward. This could cause the wound to bleed.  3. Post-operative pain is usually mild. You may take plain or extra strength Tylenol every 4 hours as needed (do not take more than 4,000mg in one day). Do not take any medicine that contains aspirin, ibuprofen or motrin unless you have been recommended these by a doctor.  Avoid alcohol and vitamin E as these may increase your tendency to bleed.  4. You may put an ice pack around the bandaged area for 20 minutes every 2-3 hours. This may help reduce swelling, bruising, and pain. Make sure the ice pack is waterproof so that the pressure bandage does not get wet.   5. You may see a small amount of drainage or blood on your pressure bandage. This is normal. However, if drainage or bleeding continues or saturates the bandage, you will need to apply firm pressure over the bandage with a washcloth for 15  minutes. If bleeding continues after applying pressure for 15 minutes then go to the nearest emergency room.  48 Hours After Surgery  Carefully remove the bandage and start daily wound care and dressing changes. You may also now shower and get the wound wet. Wash wound with a mild soap and water.  Use caution when washing the wound. Be gentle and do not let the forceful shower stream hit the wound directly.  PAT dry.  Daily Wound Care:  1. Wash wound with a mild soap and water.  Use caution when washing the wound, be gentle and do not let the forceful shower stream hit the wound directly.  2. PAT DRY.  3. Apply Vaseline (from a new container or tube) over the suture line with a Q-tip. It is very important to keep the wound continuously moist, as wounds heal best in a moist environment.  4.  Keep the site covered until sutures are removed, you can cover it with a Telfa (non-stick) dressing and tape or a band-aid.    5. If you are unable to keep wound covered, you must apply Vaseline every 2 - 3 hours (while awake) to ensure it is being kept moist for optimal healing. A dressing overnight is recommended to keep the area moist.   Call Us If:  1. You have pain that is not controlled with Tylenol.  2. You have signs or symptoms of an infection, such as: fever over 100 degrees F, redness, warmth, or foul-smelling or yellow/creamy drainage from the wound.  Who should I call with questions?    [facility]: [phone]     [facility]: [phone]    For urgent needs outside of business hours call the [facility] at [phone] and ask for the dermatology resident on call

## 2021-08-19 NOTE — PROGRESS NOTES
[facility] Mohs Surgery Procedure Note    Case #: 1  Date of Service:  Aug 19, 2021  Surgery: Mohs micrographic surgery  Staff surgeon: Hans Barba MD  Fellow surgeon: Cr Stevenson MD  Resident surgeon: None  Nurse: COLLIN Johnson    Tumor Type: SCCis  Location: right upper helix   Derm-Path Accession #: [id number]    Mohs Accession #:   Pre-Op Size: 1.0 cm x 0.2 cm  Final Defect Size: 1.5 cm x 1.0 cm  Level of Defect: Fat  Local anesthetic: 9 mL 1% lidocaine with epinephrine  Repair Type: transposition  Repair Size: 5.0 x 3.3 cm  Suture Material: 5-0 Monocryl; 5-0 fast absorbing gut    Procedure:    Stage I  We discussed the principles of treatment and most likely complications including scarring, bleeding, infection, swelling, pain, crusting, nerve damage, large wound,  incomplete excision, wound dehiscence,  nerve damage, recurrence, and a second procedure may be recommended to obtain the best cosmetic or functional result.    Informed consent was obtained and the patient underwent the procedure as follows:  The patient was placed supine on the operating table.  The cancer was identified, outlined with a marker, and verified by the patient.  The entire surgical field was prepped with chlorhexidine.  The surgical site was anesthetized using lidocaine with epinephrine.    The area of clinically apparent tumor was debulked. The layer of tissue was then surgically excised using a #15 blade and was then transferred onto a specimen sheet maintaining the orientation of the specimen. Hemostasis was obtained using electrocoagulation. The wound site was then covered with a dressing while the tissue samples were processed for examination.    The excised tissue was transported to the Mohs histology laboratory maintaining the tissue orientation.  The tissue specimen was relaxed so that the entire surgical margin was in a a single horizontal plane for sectioning and inked for precise mapping.  A  precise reference map was drawn to reflect the sectioning of the specimen, colored inking of the margins, and orientation on the patient.  The tissue was processed using horizontal sectioning of the base and continuous peripheral margins.  The histopathologic sections were reviewed in conjunction with the reference map.    Total blocks: 1  Total slides: 2    There were no cancer cells visualized on examination, therefore Mohs surgery was complete.      Hans Barba MD    PROCEDURE: Rhombic Transposition Flap    The patient was taken to the operative suite and placed supine on the operating room table.  The wound was identified and infiltrated with lidocaine with epinephrine.  The defect was then cleansed and prepped with chlorhexidine and draped with sterile drapes.  Using a marker, a rhomboid transposition flap was planned.  The wound edges were then debeveled and the wound was undermined bluntly in all directions.  The transposition flap was incised sharply to the level of fat.  The flap was undermined from all surrounding tissue. Hemostasis was obtained with electrocoagulation.  The flap was transposed into the primary defect. The secondary defect was closed with deep dermal 5-0 Monocryl sutures. Epidermal tissue was carefully approximated using 5-0 fast absorbing gut sutures in a simple running fashion throughout the length of the flap.  Any redundant skin was excised by the triangulation technique, and closed in similar fashion.  The wound was cleansed with sterile saline and Vaseline was applied. A sterile non-adherent pressure dressing was placed.  The patient left the operating suite in stable condition. Wound care was reviewed verbally and in writing.    Follow-up for suture removal: SEA Barba MD was immediately available for the entire surgery and was physicially present for the key portions of the procedure.        Scribe Disclosure:  Sury SHAH, am serving as a scribe to document services  personally performed by Hans Barba MD based on data collection and the provider's statements to me.       Attending attestation:  I personally performed the entire procedure.  I have reviewed the note and edited it as necessary, and agree with its contents.    Hans Barba M.D.  Professor  Director of Dermatologic Surgery  Department of Dermatology  [facility]    Dermatology Surgery Clinic  [facility] and [facility] [place] [id number]

## 2021-08-19 NOTE — LETTER
8/19/2021       RE: Juan Jose Razo  [address] [facility] [id number]     Dear Colleague,    Thank you for referring your patient, Juan Jose Razo, to the [facility] DERMATOLOGIC SURGERY CLINIC [place] at [facility]. Please see a copy of my visit note below.    [facility] Mohs Surgery Procedure Note    Case #: [id number]  Date of Service:  Aug 19, 2021  Surgery: Mohs micrographic surgery  Staff surgeon: Hans Barba MD  Fellow surgeon: Cr Stevenson MD  Resident surgeon: None  Nurse: COLLIN Johnson    Tumor Type: SCCis  Location: right upper helix   Derm-Path Accession #: [id number]    Mohs Accession #:   Pre-Op Size: 1.0 cm x 0.2 cm  Final Defect Size: 1.5 cm x 1.0 cm  Level of Defect: Fat  Local anesthetic: 9 mL 1% lidocaine with epinephrine  Repair Type: transposition  Repair Size: 5.0 x 3.3 cm  Suture Material: 5-0 Monocryl; 5-0 fast absorbing gut    Procedure:    Stage I  We discussed the principles of treatment and most likely complications including scarring, bleeding, infection, swelling, pain, crusting, nerve damage, large wound,  incomplete excision, wound dehiscence,  nerve damage, recurrence, and a second procedure may be recommended to obtain the best cosmetic or functional result.    Informed consent was obtained and the patient underwent the procedure as follows:  The patient was placed supine on the operating table.  The cancer was identified, outlined with a marker, and verified by the patient.  The entire surgical field was prepped with chlorhexidine.  The surgical site was anesthetized using lidocaine with epinephrine.    The area of clinically apparent tumor was debulked. The layer of tissue was then surgically excised using a #15 blade and was then transferred onto a specimen sheet maintaining the orientation of the specimen. Hemostasis was obtained using electrocoagulation. The wound site was then covered with a  dressing while the tissue samples were processed for examination.    The excised tissue was transported to the Mohs histology laboratory maintaining the tissue orientation.  The tissue specimen was relaxed so that the entire surgical margin was in a a single horizontal plane for sectioning and inked for precise mapping.  A precise reference map was drawn to reflect the sectioning of the specimen, colored inking of the margins, and orientation on the patient.  The tissue was processed using horizontal sectioning of the base and continuous peripheral margins.  The histopathologic sections were reviewed in conjunction with the reference map.    Total blocks: 1  Total slides: 2    There were no cancer cells visualized on examination, therefore Mohs surgery was complete.      Hans Barba MD    PROCEDURE: Rhombic Transposition Flap    The patient was taken to the operative suite and placed supine on the operating room table.  The wound was identified and infiltrated with lidocaine with epinephrine.  The defect was then cleansed and prepped with chlorhexidine and draped with sterile drapes.  Using a marker, a rhomboid transposition flap was planned.  The wound edges were then debeveled and the wound was undermined bluntly in all directions.  The transposition flap was incised sharply to the level of fat.  The flap was undermined from all surrounding tissue. Hemostasis was obtained with electrocoagulation.  The flap was transposed into the primary defect. The secondary defect was closed with deep dermal 5-0 Monocryl sutures. Epidermal tissue was carefully approximated using 5-0 fast absorbing gut sutures in a simple running fashion throughout the length of the flap.  Any redundant skin was excised by the triangulation technique, and closed in similar fashion.  The wound was cleansed with sterile saline and Vaseline was applied. A sterile non-adherent pressure dressing was placed.  The patient left the operating suite in  stable condition. Wound care was reviewed verbally and in writing.    Follow-up for suture removal: SEA Barba MD was immediately available for the entire surgery and was physicially present for the key portions of the procedure.        Scribe Disclosure:  I, Sury Carcamo, am serving as a scribe to document services personally performed by Hans Barba MD based on data collection and the provider's statements to me.       Attending attestation:  I personally performed the entire procedure.  I have reviewed the note and edited it as necessary, and agree with its contents.    Hans Barba M.D.  Professor  Director of Dermatologic Surgery  Department of Dermatology  [facility]    Dermatology Surgery Clinic  [facility]  [address] [place], [place] [id number]

## 2021-08-19 NOTE — NURSING NOTE
Chief Complaint   Patient presents with     Derm Problem     Patient is here today for mohs on right upper helix.      Denia STARKS CMA

## 2021-11-05 DIAGNOSIS — D48.5 NEOPLASM OF UNCERTAIN BEHAVIOR OF SKIN: Primary | ICD-10-CM

## 2021-11-10 ENCOUNTER — OFFICE VISIT (OUTPATIENT)
Dept: DERMATOLOGY | Facility: CLINIC | Age: 71
End: 2021-11-10
Payer: COMMERCIAL

## 2021-11-10 DIAGNOSIS — D48.5 NEOPLASM OF UNCERTAIN BEHAVIOR OF SKIN: ICD-10-CM

## 2021-11-10 PROCEDURE — 99213 OFFICE O/P EST LOW 20 MIN: CPT | Mod: 24 | Performed by: DERMATOLOGY

## 2021-11-10 ASSESSMENT — PAIN SCALES - GENERAL: PAINLEVEL: NO PAIN (0)

## 2021-11-10 NOTE — NURSING NOTE
Chief Complaint   Patient presents with     Derm Problem     Patient is here today for biopsy of nail      Denia STARKS CMA

## 2021-11-10 NOTE — PROGRESS NOTES
Dermatologic Surgery Clinic Note    Nov 10, 2021    Dermatology Problem List:  #. SCC  - R nasal ala s/p MMS 9/24/2018  - Left temple s/p MMS 10/8/15  - SCCis, right upper helix s/p MMS 8/19/2021  #. Hx of dysplastic nevus, pt reported  #. Onychomycosis  #. AKs  #. 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 Intertrigo in groin  - s/p, hydrocortisone 2.5% ointment  triamcinolone 0.025% ointment, nystatin cream  #. 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 Benign bx:  - SK, Left parietal scalp, 9/12/2018  #. Skin cancer screening 8/11/2021   #. ISK s/p cryo 8/11/2021  # Digital myxoid cyst, left index finger, monitoring    Subjective: The patient is a 70 year old man who presents today for consult. Patient reports spot on his left index finger that he would like examined.    No other associated symptoms, modifying factors, or prior treatments, except when noted above. The patient denies any constitutional symptoms, lymphadenopathy, unintentional weight loss or decreased appetite. No other skin concerns today.    Objective:   Gen: This is a well appearing individual in no acute distress. The patient is alert and oriented x 3.  An exam of the left hand was performed today and visualized the following:    - Under the proximal nail fold of the left index finger, there is a cystic papule with a crust overlying a longitudinal groove in the nail plate. The cyst is easily compressible and not painful.    Assessment and Plan:     # Digital myxoid cyst, left index finger  Resolving today. Patient will consider excision in the future if it becomes becomes bothersome.    The patient was discussed with and evaluated by attending physician, Hans Barba MD.    Cr Stevenson MD  Micrographic Surgery and Dermatologic Oncology (MSDO) Fellow    Scribe Disclosure:  Alexx SHAH, am serving as a scribe to document services personally performed by Hans Barba MD based on data collection and  the provider's statements to me.

## 2021-11-10 NOTE — LETTER
11/10/2021       RE: Juan Jose Razo  [address] [facility] [address]     Dear Colleague,    Thank you for referring your patient, Juan Jose Razo, to the [facility] DERMATOLOGIC SURGERY CLINIC [place] at [facility]. Please see a copy of my visit note below.    Dermatologic Surgery Clinic Note    Nov 10, 2021    Dermatology Problem List:  #. SCC  - R nasal ala s/p MMS 9/24/2018  - Left temple s/p MMS 10/8/15  - SCCis, right upper helix s/p MMS 8/19/2021  #. Hx of dysplastic nevus, pt reported  #. Onychomycosis  #. AKs  #. 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 Intertrigo in groin  - s/p, hydrocortisone 2.5% ointment  triamcinolone 0.025% ointment, nystatin cream  #. 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 Benign bx:  - SK, Left parietal scalp, 9/12/2018  #. Skin cancer screening 8/11/2021   #. ISK s/p cryo 8/11/2021  # Digital myxoid cyst, left index finger, monitoring    Subjective: The patient is a 70 year old man who presents today for consult. Patient reports spot on his left index finger that he would like examined.    No other associated symptoms, modifying factors, or prior treatments, except when noted above. The patient denies any constitutional symptoms, lymphadenopathy, unintentional weight loss or decreased appetite. No other skin concerns today.    Objective:   Gen: This is a well appearing individual in no acute distress. The patient is alert and oriented x 3.  An exam of the left hand was performed today and visualized the following:    - Under the proximal nail fold of the left index finger, there is a cystic papule with a crust overlying a longitudinal groove in the nail plate. The cyst is easily compressible and not painful.    Assessment and Plan:     # Digital myxoid cyst, left index finger  Resolving today. Patient will consider excision in the future if it becomes becomes  bothersome.    The patient was discussed with and evaluated by attending physician, Hans Barba MD.    Cr Stevenson MD  Micrographic Surgery and Dermatologic Oncology (MSDO) Fellow    Scribe Disclosure:  I, Alexx Lira, am serving as a scribe to document services personally performed by Hans Barba MD based on data collection and the provider's statements to me.

## 2021-11-15 DIAGNOSIS — K21.9 GERD (GASTROESOPHAGEAL REFLUX DISEASE): ICD-10-CM

## 2021-11-16 RX ORDER — OMEPRAZOLE 40 MG/1
CAPSULE, DELAYED RELEASE ORAL
Qty: 90 CAPSULE | Refills: 3 | Status: SHIPPED | OUTPATIENT
Start: 2021-11-16 | End: 2022-11-14

## 2021-11-16 NOTE — TELEPHONE ENCOUNTER
"Requested Prescriptions   Signed Prescriptions Disp Refills    omeprazole (PRILOSEC) 40 MG DR capsule 90 capsule 3     Sig: TAKE ONE CAPSULE BY MOUTH ONCE DAILY       PPI Protocol Passed - 11/15/2021  9:35 AM        Passed - Not on Clopidogrel (unless Pantoprazole ordered)        Passed - No diagnosis of osteoporosis on record        Passed - Recent (12 mo) or future (30 days) visit within the authorizing provider's specialty     Patient has had an office visit with the authorizing provider or a provider within the authorizing providers department within the previous 12 mos or has a future within next 30 days. See \"Patient Info\" tab in inbasket, or \"Choose Columns\" in Meds & Orders section of the refill encounter.              Passed - Medication is active on med list        Passed - Patient is age 18 or older           Fiorella Felix RN  [facility]     "

## 2021-11-21 ASSESSMENT — ACTIVITIES OF DAILY LIVING (ADL): CURRENT_FUNCTION: NO ASSISTANCE NEEDED

## 2021-11-22 ENCOUNTER — OFFICE VISIT (OUTPATIENT)
Dept: FAMILY MEDICINE | Facility: CLINIC | Age: 71
End: 2021-11-22
Payer: COMMERCIAL

## 2021-11-22 ENCOUNTER — LAB (OUTPATIENT)
Dept: LAB | Facility: CLINIC | Age: 71
End: 2021-11-22

## 2021-11-22 ENCOUNTER — HOSPITAL ENCOUNTER (OUTPATIENT)
Dept: GENERAL RADIOLOGY | Facility: CLINIC | Age: 71
Discharge: HOME | End: 2021-11-22
Attending: FAMILY MEDICINE | Admitting: FAMILY MEDICINE
Payer: COMMERCIAL

## 2021-11-22 VITALS
WEIGHT: 292 LBS | HEART RATE: 81 BPM | BODY MASS INDEX: 36.31 KG/M2 | OXYGEN SATURATION: 96 % | HEIGHT: 75 IN | TEMPERATURE: 96.6 F | SYSTOLIC BLOOD PRESSURE: 133 MMHG | DIASTOLIC BLOOD PRESSURE: 84 MMHG

## 2021-11-22 DIAGNOSIS — I10 HYPERTENSION GOAL BP (BLOOD PRESSURE) < 140/90: ICD-10-CM

## 2021-11-22 DIAGNOSIS — M25.572 PAIN IN JOINT INVOLVING ANKLE AND FOOT, LEFT: ICD-10-CM

## 2021-11-22 DIAGNOSIS — Z00.00 MEDICARE ANNUAL WELLNESS VISIT, SUBSEQUENT: Primary | ICD-10-CM

## 2021-11-22 DIAGNOSIS — Z13.6 CARDIOVASCULAR SCREENING; LDL GOAL LESS THAN 130: ICD-10-CM

## 2021-11-22 DIAGNOSIS — Z12.11 SPECIAL SCREENING FOR MALIGNANT NEOPLASMS, COLON: ICD-10-CM

## 2021-11-22 LAB
ALBUMIN SERPL-MCNC: 3.7 G/DL (ref 3.4–5)
ALP SERPL-CCNC: 69 U/L (ref 40–150)
ALT SERPL W P-5'-P-CCNC: 55 U/L (ref 0–70)
ANION GAP SERPL CALCULATED.3IONS-SCNC: 7 MMOL/L (ref 3–14)
AST SERPL W P-5'-P-CCNC: 42 U/L (ref 0–45)
BILIRUB SERPL-MCNC: 1 MG/DL (ref 0.2–1.3)
BUN SERPL-MCNC: 13 MG/DL (ref 7–30)
CALCIUM SERPL-MCNC: 8.6 MG/DL (ref 8.5–10.1)
CHLORIDE BLD-SCNC: 103 MMOL/L (ref 94–109)
CHOLEST SERPL-MCNC: 154 MG/DL
CO2 SERPL-SCNC: 26 MMOL/L (ref 20–32)
CREAT SERPL-MCNC: 0.99 MG/DL (ref 0.66–1.25)
CREAT UR-MCNC: 76 MG/DL
CRP SERPL-MCNC: 5 MG/L (ref 0–8)
FASTING STATUS PATIENT QL REPORTED: YES
GFR SERPL CREATININE-BSD FRML MDRD: 78 ML/MIN/1.73M2
GLUCOSE BLD-MCNC: 111 MG/DL (ref 70–99)
HDLC SERPL-MCNC: 35 MG/DL
LDLC SERPL CALC-MCNC: 93 MG/DL
MICROALBUMIN UR-MCNC: 6 MG/L
MICROALBUMIN/CREAT UR: 7.89 MG/G CR (ref 0–17)
NONHDLC SERPL-MCNC: 119 MG/DL
POTASSIUM BLD-SCNC: 4.3 MMOL/L (ref 3.4–5.3)
PROT SERPL-MCNC: 8 G/DL (ref 6.8–8.8)
SODIUM SERPL-SCNC: 136 MMOL/L (ref 133–144)
TRIGL SERPL-MCNC: 130 MG/DL
URATE SERPL-MCNC: 5.5 MG/DL (ref 3.5–7.2)

## 2021-11-22 PROCEDURE — 80061 LIPID PANEL: CPT | Performed by: FAMILY MEDICINE

## 2021-11-22 PROCEDURE — 84550 ASSAY OF BLOOD/URIC ACID: CPT | Performed by: FAMILY MEDICINE

## 2021-11-22 PROCEDURE — 86140 C-REACTIVE PROTEIN: CPT | Performed by: FAMILY MEDICINE

## 2021-11-22 PROCEDURE — 99397 PER PM REEVAL EST PAT 65+ YR: CPT | Performed by: FAMILY MEDICINE

## 2021-11-22 PROCEDURE — 82043 UR ALBUMIN QUANTITATIVE: CPT

## 2021-11-22 PROCEDURE — 80053 COMPREHEN METABOLIC PANEL: CPT | Performed by: FAMILY MEDICINE

## 2021-11-22 PROCEDURE — 73610 X-RAY EXAM OF ANKLE: CPT | Mod: 26 | Performed by: RADIOLOGY

## 2021-11-22 PROCEDURE — 36415 COLL VENOUS BLD VENIPUNCTURE: CPT | Performed by: FAMILY MEDICINE

## 2021-11-22 PROCEDURE — 73610 X-RAY EXAM OF ANKLE: CPT | Mod: LT

## 2021-11-22 PROCEDURE — 99213 OFFICE O/P EST LOW 20 MIN: CPT | Mod: 25 | Performed by: FAMILY MEDICINE

## 2021-11-22 ASSESSMENT — MIFFLIN-ST. JEOR: SCORE: 2180.13

## 2021-11-22 NOTE — PROGRESS NOTES
SUBJECTIVE:   Juan Jose Razo is a 70 year old male who presents for Preventive Visit.      Patient has been advised of split billing requirements and indicates understanding: No   Are you in the first 12 months of your Medicare coverage?  No    HPI  Do you feel safe in your environment? Yes    Have you ever done Advance Care Planning? (For example, a Health Directive, POLST, or a discussion with a medical provider or your loved ones about your wishes): Yes, advance care planning is on file.       Fall risk       Cognitive Screening   1) Repeat 3 items (Leader, Season, Table)    2) Clock draw: normal   3) 3 item recall: Recalls 3 objects  Results: 3 items recalled: COGNITIVE IMPAIRMENT LESS LIKELY    Mini-CogTM Copyright S [place]. Licensed by the author for use in [employer]; reprinted with permission (jhoan@.[facility]). All rights reserved.      Do you have sleep apnea, excessive snoring or daytime drowsiness?: yes    Reviewed and updated as needed this visit by clinical staff                Reviewed and updated as needed this visit by Provider               Social History     Tobacco Use     Smoking status: Never Smoker     Smokeless tobacco: Never Used   Substance Use Topics     Alcohol use: Yes     Comment: 1-2 beers a week          Alcohol Use 11/21/2021   Prescreen: >3 drinks/day or >7 drinks/week? No   Prescreen: >3 drinks/day or >7 drinks/week? -   No flowsheet data found.    Encounter Diagnoses   Name Primary?     Medicare annual wellness visit, subsequent Yes     Pain in joint involving ankle and foot, left      Hypertension goal BP (blood pressure) < 140/90      CARDIOVASCULAR SCREENING; LDL GOAL LESS THAN 130      Special screening for malignant neoplasms, colon         Hypertension Follow-up      Do you check your blood pressure regularly outside of the clinic? Yes     Are you following a low salt diet? Yes    Are your blood pressures ever more than 140 on the top number (systolic) OR  "more   than 90 on the bottom number (diastolic), for example 140/90? No      Current providers sharing in care for this patient include:   Patient Care Team:  Alphonso Grover MD as PCP - General (Family Practice)  Imelda Rincon PA-C as Physician Assistant (Physician Assistant)  Servando Diane MD as MD (Gastroenterology)  Ernesto Blankenship MD as Assigned Sleep Provider  Alphonso Grover MD as Assigned PCP  Thea Sosa CNP as Nurse Practitioner (Urology)  Sharon Lozoya RN as Registered Nurse  Imelda Rincon PA-C as Assigned Surgical Provider    The following health maintenance items are reviewed in Epic and correct as of today:  Health Maintenance Due   Topic Date Due     ANNUAL REVIEW OF  ORDERS  Never done     AORTIC ANEURYSM SCREENING (SYSTEM ASSIGNED)  Never done     MEDICARE ANNUAL WELLNESS VISIT  10/26/2021     Lab work is in process  Pneumonia Vaccine:Adults age 65+ who received Pneumovax (PPSV23) at 65 years or older: Should be given PCV13 > 1 year after their most recent PPSV23        Review of Systems  Constitutional, HEENT, cardiovascular, pulmonary, gi and gu systems are negative, except as otherwise noted.    OBJECTIVE:   /84   Pulse 81   Temp (!) 96.6  F (35.9  C)   Ht 1.905 m (6' 3\")   Wt 132.5 kg (292 lb)   SpO2 96%   BMI 36.50 kg/m   Estimated body mass index is 36.5 kg/m  as calculated from the following:    Height as of this encounter: 1.905 m (6' 3\").    Weight as of this encounter: 132.5 kg (292 lb).  Physical Exam  GENERAL: healthy, alert and no distress  EYES: Eyes grossly normal to inspection, PERRL and conjunctivae and sclerae normal  HENT: ear canals and TM's normal, nose and mouth without ulcers or lesions  NECK: no adenopathy, no asymmetry, masses, or scars and thyroid normal to palpation  RESP: lungs clear to auscultation - no rales, rhonchi or wheezes  CV: regular rate and rhythm, normal S1 S2, no S3 " or S4, no murmur, click or rub, no peripheral edema and peripheral pulses strong  ABDOMEN: soft, nontender, no hepatosplenomegaly, no masses and bowel sounds normal   (male): normal male genitalia without lesions or urethral discharge, no hernia  RECTAL (male): normal sphincter tone, no rectal masses, prostate normal size, smooth, nontender without nodules or masses  MS: normal muscle tone, no cyanosis, clubbing, or edema and arthritis of the both ankles  SKIN: no suspicious lesions or rashes  NEURO: Normal strength and tone, mentation intact and speech normal  PSYCH: mentation appears normal, affect normal/bright  LYMPH: no cervical, supraclavicular, axillary, or inguinal adenopathy        ASSESSMENT / PLAN:   (Z00.00) Medicare annual wellness visit, subsequent  (primary encounter diagnosis)  Comment: overall well  Plan: keep walking, keep weigh tdown    (M25.572) Pain in joint involving ankle and foot, left  Comment: get xray  Plan: Uric acid, CRP, inflammation, XR Ankle Left G/E        3 Views            (I10) Hypertension goal BP (blood pressure) < 140/90  Comment: Well controlled   Plan: Albumin Random Urine Quantitative with Creat         Ratio, Comprehensive metabolic panel (BMP +         Alb, Alk Phos, ALT, AST, Total. Bili, TP)            (Z13.6) CARDIOVASCULAR SCREENING; LDL GOAL LESS THAN 130  Comment: recheck  Plan: Lipid panel reflex to direct LDL Fasting            (Z12.11) Special screening for malignant neoplasms, colon  Comment: will do  Plan: Adult Gastro Ref - Procedure Only              Patient has been advised of split billing requirements and indicates understanding: No  COUNSELING:  Reviewed preventive health counseling, as reflected in patient instructions       Regular exercise       Healthy diet/nutrition       Vision screening       Hearing screening       Dental care       Bladder control       Fall risk prevention       Immunizations    Vaccinated for: Influenza             Colon  "cancer screening       Prostate cancer screening    Estimated body mass index is 36.5 kg/m  as calculated from the following:    Height as of this encounter: 1.905 m (6' 3\").    Weight as of this encounter: 132.5 kg (292 lb).    Weight management plan: Discussed healthy diet and exercise guidelines    He reports that he has never smoked. He has never used smokeless tobacco.      Appropriate preventive services were discussed with this patient, including applicable screening as appropriate for cardiovascular disease, diabetes, osteopenia/osteoporosis, and glaucoma.  As appropriate for age/gender, discussed screening for colorectal cancer, prostate cancer, breast cancer, and cervical cancer. Checklist reviewing preventive services available has been given to the patient.    Reviewed patients plan of care and provided an AVS. The Intermediate Care Plan ( asthma action plan, low back pain action plan, and migraine action plan) for Juan Jose meets the Care Plan requirement. This Care Plan has been established and reviewed with the Patient.    Counseling Resources:  ATP IV Guidelines  Pooled Cohorts Equation Calculator  Breast Cancer Risk Calculator  Breast Cancer: Medication to Reduce Risk  FRAX Risk Assessment  ICSI Preventive Guidelines  Dietary Guidelines for Americans, 2010  USDA's MyPlate  ASA Prophylaxis  Lung CA Screening    Alphonso Grover MD  [facility]    Identified Health Risks:  "

## 2021-11-23 ENCOUNTER — TELEPHONE (OUTPATIENT)
Dept: GASTROENTEROLOGY | Facility: CLINIC | Age: 71
End: 2021-11-23
Payer: COMMERCIAL

## 2021-11-23 DIAGNOSIS — Z11.59 ENCOUNTER FOR SCREENING FOR OTHER VIRAL DISEASES: ICD-10-CM

## 2021-11-23 NOTE — TELEPHONE ENCOUNTER
Screening Questions  1. Are you active on mychart? Y    2. What insurance is in the chart? Medicare/ BCBS    2.  Ordering/Referring Provider: Alphonso Grover,     3. BMI 35.6, If greater than 40 review exclusion criteria    4.  Respiratory Screening (If yes to any of the following Hospital setting only):     Do you use daily home oxygen? N  Do you have mod to severe Obstructive Sleep Apnea? Y   Do you have Pulmonary Hypertension? N   Do you have SEVERE asthma? N    5. Have you had a heart or lung transplant (If yes, please review exclusion criteria) ? N    6. Are you currently on dialysis or have chronic kidney disease? N    7. Have you had a stroke or Transient ischemic attack (TIA) within 6 months? N    8. In the past 6 months, have you had any heart related issues including cardiomyopathy or heart attack? N                 If yes, did it require cardiac stenting or other implantable device?N      9. Do you have any implantable devices in your body (pacemaker, defib, LVAD)? N    10. Do you take nitroglycerin? If yes, how often? N    11. Are you currently taking any blood thinners?N    12. Are you a diabetic? N    13. (Females) Are you currently pregnant?   If yes, how many weeks?      15. Are you taking any prescription pain medications on a routine schedule? N  If yes, MAC sedation.    16. Do you have any chemical dependencies such as alcohol, street drugs, or methadone? N If yes, MAC sedation.    17. Do you have any history of post-traumatic stress syndrome, severe anxiety or history of psychosis? N     18. Do you transfer independently? Y    19.  Do you have any issues with constipation? N    20. Preferred Pharmacy for Pre Prescription [facility]     Scheduling Details    Which Colonoscopy Prep was Sent?: Miralax  Procedure Scheduled: Colonoscopy  Surgeon: Paco  Date of Procedure: 12-6  Location: [facility]  Caller (Please ask for phone number if not scheduled by patient):  Juan Jose      Sedation Type: CS  Conscious Sedation- Needs  for 6 hours after the procedure  MAC/General-Needs  for 24 hours after procedure    Pre-op Required at [facility] [place], [place] and OR for MAC sedation:   (if yes advise patient they will need a pre-op prior to procedure)      Is patient on blood thinners? -N (If yes- inform patient to follow up with PCP or provider for follow up instructions)     Informed patient they will need an adult  Y     Cannot take any type of public or medical transportation alone    Pre-Procedure Covid test to be completed at [facility] or Externally: Y - [facility] 12-2    Confirmed Nurse will call to complete assessment Y    Additional comments:

## 2021-11-29 ENCOUNTER — TELEPHONE (OUTPATIENT)
Dept: GASTROENTEROLOGY | Facility: CLINIC | Age: 71
End: 2021-11-29
Payer: COMMERCIAL

## 2021-11-29 NOTE — TELEPHONE ENCOUNTER
Pre assessment questions completed for upcoming colonoscopy procedure scheduled on 12/6/21    COVID test scheduled 12/2/21    Reviewed procedural arrival time 0900 and facility location [facility].    Designated  policy reviewed. Instructed to have someone stay 6 hours post procedure.     Bowel prep recommendation: Miralax/Magnesium citrate/Dulcolax     Reviewed Miralax prep instructions with patient. No fiber/iron supplements or foods that contain nuts/seeds 7 days prior to procedure.     Anticoagulation/blood thinners? no    Electronic implanted devices? no    Patient verbalized understanding and had no questions or concerns at this time.    Omayra Sharif RN

## 2021-11-29 NOTE — TELEPHONE ENCOUNTER
Patient ID: Pierce is a 20 year old female.    Chief Complaint   Patient presents with   • Sinus Problem     Facial pain/pressure and HA, started Thursday last week.         Sinus Problem   This is a new problem. The current episode started in the past 7 days. The problem occurs constantly. The problem has been gradually worsening. Associated symptoms include anorexia, congestion, coughing, headaches and nausea. Pertinent negatives include no chills, fever or sore throat. Nothing aggravates the symptoms. She has tried nothing for the symptoms.       Patient's medications, allergies, past medical, surgical, social and family histories were reviewed and updated as appropriate.    Review of Systems   Constitutional: Negative for chills and fever.   HENT: Positive for congestion, ear pain, sinus pressure, sinus pain and sneezing. Negative for sore throat.    Eyes: Negative.    Respiratory: Positive for cough. Negative for shortness of breath.    Cardiovascular: Negative.    Gastrointestinal: Positive for anorexia and nausea.   Neurological: Positive for headaches.       There is no problem list on file for this patient.      Current Outpatient Medications   Medication Sig Dispense Refill   • amoxicillin-clavulanate (AUGMENTIN) 875-125 MG per tablet Take 1 tablet by mouth every 12 hours. Take with food. 20 tablet 0     No current facility-administered medications for this visit.        Past Medical History:   Diagnosis Date   • No known problems        Social History     Socioeconomic History   • Marital status: Unknown     Spouse name: Not on file   • Number of children: Not on file   • Years of education: Not on file   • Highest education level: Not on file   Social Needs   • Financial resource strain: Not on file   • Food insecurity - worry: Not on file   • Food insecurity - inability: Not on file   • Transportation needs - medical: Not on file   • Transportation needs - non-medical: Not on file   Occupational History  Patient scheduled for colonoscopy on 12/6/21.     Covid test scheduled: 12/2/21    Arrival time: 0900    Facility location: [facility]    Sedation type: CS    Indication for procedure: screening    Anticoagulations? no     Bowel prep recommendation: Miralax/Magnesium citrate/Dulcolax     Pre visit planning completed.    Omayra Sharif RN       • Not on file   Tobacco Use   • Smoking status: Never Smoker   Substance and Sexual Activity   • Alcohol use: No     Frequency: Never   • Drug use: Not on file   • Sexual activity: Not on file   Other Topics Concern   • Not on file   Social History Narrative   • Not on file       Family History   Problem Relation Age of Onset   • Patient is unaware of any medical problems Mother    • Patient is unaware of any medical problems Father        Past Surgical History:   Procedure Laterality Date   • No past surgeries         No exam data present    ALLERGIES:  No Known Allergies    Visit Vitals  /78 (BP Location: [facility], Patient Position: Sitting)   Pulse 80   Temp 98.1 °F (36.7 °C) (Oral)   Resp 18   Ht 5' 3\" (1.6 m)   Wt 54.4 kg (120 lb)   LMP 11/27/2018 (Exact Date)   BMI 21.26 kg/m²       Physical Exam   Constitutional: She is oriented to person, place, and time. She appears well-developed and well-nourished.   HENT:   Head: Normocephalic.   Nose: Right sinus exhibits maxillary sinus tenderness and frontal sinus tenderness. Left sinus exhibits maxillary sinus tenderness and frontal sinus tenderness.   Eyes: EOM are normal. Pupils are equal, round, and reactive to light.   Pulmonary/Chest: Effort normal and breath sounds normal.   No cough with deep inspiration   Abdominal: Soft. Bowel sounds are normal.   Lymphadenopathy:     She has no cervical adenopathy.   Neurological: She is alert and oriented to person, place, and time.   Psychiatric: She has a normal mood and affect. Her behavior is normal.       Plan:  Pierce was seen today for sinus problem.    Diagnoses and all orders for this visit:    Acute frontal sinusitis, recurrence not specified  -     amoxicillin-clavulanate (AUGMENTIN) 875-125 MG per tablet; Take 1 tablet by mouth every 12 hours. Take with food.        Discussion:        Adelaida Bhagat, CNP

## 2021-12-02 ENCOUNTER — LAB (OUTPATIENT)
Dept: LAB | Facility: CLINIC | Age: 71
End: 2021-12-02
Payer: COMMERCIAL

## 2021-12-02 DIAGNOSIS — Z11.59 ENCOUNTER FOR SCREENING FOR OTHER VIRAL DISEASES: ICD-10-CM

## 2021-12-02 PROCEDURE — U0005 INFEC AGEN DETEC AMPLI PROBE: HCPCS

## 2021-12-02 PROCEDURE — U0003 INFECTIOUS AGENT DETECTION BY NUCLEIC ACID (DNA OR RNA); SEVERE ACUTE RESPIRATORY SYNDROME CORONAVIRUS 2 (SARS-COV-2) (CORONAVIRUS DISEASE [COVID-19]), AMPLIFIED PROBE TECHNIQUE, MAKING USE OF HIGH THROUGHPUT TECHNOLOGIES AS DESCRIBED BY CMS-2020-01-R: HCPCS

## 2021-12-03 LAB — SARS-COV-2 RNA RESP QL NAA+PROBE: NEGATIVE

## 2021-12-06 ENCOUNTER — HOSPITAL ENCOUNTER (OUTPATIENT)
Facility: CLINIC | Age: 71
Discharge: HOME | End: 2021-12-06
Attending: INTERNAL MEDICINE | Admitting: INTERNAL MEDICINE
Payer: COMMERCIAL

## 2021-12-06 VITALS
BODY MASS INDEX: 35.39 KG/M2 | RESPIRATION RATE: 16 BRPM | TEMPERATURE: 98.2 F | WEIGHT: 284.61 LBS | DIASTOLIC BLOOD PRESSURE: 86 MMHG | OXYGEN SATURATION: 97 % | HEART RATE: 66 BPM | HEIGHT: 75 IN | SYSTOLIC BLOOD PRESSURE: 134 MMHG

## 2021-12-06 DIAGNOSIS — E66.01 MORBID OBESITY (H): Primary | ICD-10-CM

## 2021-12-06 LAB — COLONOSCOPY: NORMAL

## 2021-12-06 PROCEDURE — 99153 MOD SED SAME PHYS/QHP EA: CPT | Performed by: INTERNAL MEDICINE

## 2021-12-06 PROCEDURE — G0500 MOD SEDAT ENDO SERVICE >5YRS: HCPCS | Performed by: INTERNAL MEDICINE

## 2021-12-06 PROCEDURE — 45385 COLONOSCOPY W/LESION REMOVAL: CPT | Mod: PT

## 2021-12-06 PROCEDURE — 45380 COLONOSCOPY AND BIOPSY: CPT | Performed by: INTERNAL MEDICINE

## 2021-12-06 PROCEDURE — 250N000013 HC RX MED GY IP 250 OP 250 PS 637: Performed by: INTERNAL MEDICINE

## 2021-12-06 PROCEDURE — 250N000011 HC RX IP 250 OP 636: Performed by: INTERNAL MEDICINE

## 2021-12-06 PROCEDURE — 88305 TISSUE EXAM BY PATHOLOGIST: CPT | Mod: TC | Performed by: INTERNAL MEDICINE

## 2021-12-06 RX ORDER — ONDANSETRON 2 MG/ML
4 INJECTION INTRAMUSCULAR; INTRAVENOUS EVERY 6 HOURS PRN
Status: DISCONTINUED | OUTPATIENT
Start: 2021-12-06 | End: 2021-12-06 | Stop reason: HOSPADM

## 2021-12-06 RX ORDER — PROCHLORPERAZINE MALEATE 5 MG
5 TABLET ORAL EVERY 6 HOURS PRN
Status: DISCONTINUED | OUTPATIENT
Start: 2021-12-06 | End: 2021-12-06 | Stop reason: HOSPADM

## 2021-12-06 RX ORDER — SIMETHICONE 40MG/0.6ML
SUSPENSION, DROPS(FINAL DOSAGE FORM)(ML) ORAL PRN
Status: COMPLETED | OUTPATIENT
Start: 2021-12-06 | End: 2021-12-06

## 2021-12-06 RX ORDER — ONDANSETRON 4 MG/1
4 TABLET, ORALLY DISINTEGRATING ORAL EVERY 6 HOURS PRN
Status: DISCONTINUED | OUTPATIENT
Start: 2021-12-06 | End: 2021-12-06 | Stop reason: HOSPADM

## 2021-12-06 RX ORDER — NALOXONE HYDROCHLORIDE 0.4 MG/ML
0.4 INJECTION, SOLUTION INTRAMUSCULAR; INTRAVENOUS; SUBCUTANEOUS
Status: DISCONTINUED | OUTPATIENT
Start: 2021-12-06 | End: 2021-12-06 | Stop reason: HOSPADM

## 2021-12-06 RX ORDER — LIDOCAINE 40 MG/G
CREAM TOPICAL
Status: DISCONTINUED | OUTPATIENT
Start: 2021-12-06 | End: 2021-12-06 | Stop reason: HOSPADM

## 2021-12-06 RX ORDER — ONDANSETRON 2 MG/ML
4 INJECTION INTRAMUSCULAR; INTRAVENOUS
Status: DISCONTINUED | OUTPATIENT
Start: 2021-12-06 | End: 2021-12-06 | Stop reason: HOSPADM

## 2021-12-06 RX ORDER — FLUMAZENIL 0.1 MG/ML
0.2 INJECTION, SOLUTION INTRAVENOUS
Status: DISCONTINUED | OUTPATIENT
Start: 2021-12-06 | End: 2021-12-06 | Stop reason: HOSPADM

## 2021-12-06 RX ORDER — NALOXONE HYDROCHLORIDE 0.4 MG/ML
0.2 INJECTION, SOLUTION INTRAMUSCULAR; INTRAVENOUS; SUBCUTANEOUS
Status: DISCONTINUED | OUTPATIENT
Start: 2021-12-06 | End: 2021-12-06 | Stop reason: HOSPADM

## 2021-12-06 RX ORDER — FENTANYL CITRATE 50 UG/ML
INJECTION, SOLUTION INTRAMUSCULAR; INTRAVENOUS PRN
Status: COMPLETED | OUTPATIENT
Start: 2021-12-06 | End: 2021-12-06

## 2021-12-06 RX ADMIN — FENTANYL CITRATE 50 MCG: 50 INJECTION, SOLUTION INTRAMUSCULAR; INTRAVENOUS at 11:16

## 2021-12-06 RX ADMIN — MIDAZOLAM 2 MG: 1 INJECTION INTRAMUSCULAR; INTRAVENOUS at 10:59

## 2021-12-06 RX ADMIN — SIMETHICONE 133 MG: 20 SUSPENSION/ DROPS ORAL at 10:49

## 2021-12-06 RX ADMIN — FENTANYL CITRATE 100 MCG: 50 INJECTION, SOLUTION INTRAMUSCULAR; INTRAVENOUS at 10:59

## 2021-12-06 RX ADMIN — MIDAZOLAM 1 MG: 1 INJECTION INTRAMUSCULAR; INTRAVENOUS at 11:17

## 2021-12-06 ASSESSMENT — MIFFLIN-ST. JEOR: SCORE: 2146.63

## 2021-12-06 NOTE — H&P
ENDOSCOPY PRE-SEDATION H&P FOR OUTPATIENT PROCEDURES    Juan Jose M Danni  [id number]  1950    Procedure: Colonoscopy     Pre-procedure diagnosis: Surveillance polyps    Past medical history:   Past Medical History:   Diagnosis Date     Acid reflux      Allergies      HTN (hypertension) 1990     Squamous cell carcinoma      Varicose veins      Patient Active Problem List   Diagnosis     CARDIOVASCULAR SCREENING; LDL GOAL LESS THAN 130     Hypertension goal BP (blood pressure) < 140/90     GERD (gastroesophageal reflux disease)     Seasonal allergic rhinitis     Advanced directives, counseling/discussion     Skin exam, screening for cancer     Multiple benign nevi     Seborrheic keratoses     Skin cancer screening     Morbid obesity (H)       Past surgical history:   Past Surgical History:   Procedure Laterality Date     COLONOSCOPY  2001, 2006, 2010    polyp     keratinous cyst  12/09     LIGATE VEIN VARICOSE, PHLEBECTOMY MULTIPLE STAB, COMBINED  1995     MOHS MICROGRAPHIC PROCEDURE       NO HISTORY OF SURGERY  10/24/13    derm       Current Facility-Administered Medications   Medication     lidocaine (LMX4) cream     lidocaine 1 % 0.1-1 mL     ondansetron (ZOFRAN) injection 4 mg     sodium chloride (PF) 0.9% PF flush 3 mL     sodium chloride (PF) 0.9% PF flush 3 mL       No Known Allergies    History of Anesthesia/Sedation Problems: no    Physical Exam:    Mental status: alert  Heart: Normal  Lung: Normal  Assessment of patient's airway: Normal  Other as pertinent for procedure: None     Lab Results   Component Value Date    WBC 5.8 03/22/2021     Lab Results   Component Value Date    RBC 4.85 03/22/2021     Lab Results   Component Value Date    HGB 15.2 03/22/2021     Lab Results   Component Value Date    HCT 42.7 03/22/2021     Lab Results   Component Value Date    MCV 88 03/22/2021     Lab Results   Component Value Date    MCH 31.3 03/22/2021     Lab Results   Component Value Date    MCHC 35.6 03/22/2021      Lab Results   Component Value Date    RDW 13.3 03/22/2021     Lab Results   Component Value Date     03/22/2021     INR   Date Value Ref Range Status   09/17/2013 3.00 (H) 0.86 - 1.14 Final     INR Protime   Date Value Ref Range Status   01/21/2014 3.2 (A) 0.86 - 1.14 Final        ASA Score: See Provation note    Mallampati score:  II - Faucial pillars and soft palate may be seen, but uvula is masked by the base of the tongue    ASA II    Assessment/Plan:   Proceed with colonoscopy    The patient is an appropriate candidate to receive sedation.    Informed consent was discussed with the patient/family, including the risks, benefits, potential complications and any alternative options associated with sedation.    Patient assessment completed just prior to sedation and while under constant observation by the provider. Condition determined to be adequate for proceeding with sedation.    The specific risks for the procedure were discussed with the patient at the time of informed consent and include but are not limited to perforation which could require surgery, missing significant neoplasm or lesion, hemorrhage and adverse sedative complication.      Anne Marie Barnett MD

## 2021-12-06 NOTE — OR NURSING
Procedure: Colonoscopy with polypectomy x8  Sedation: Conscious sedation  Specimens: x3 jars to path  O2: 2-4L NC    Patient tolerated procedure well. Patient stable on transfer to .

## 2021-12-07 LAB
PATH REPORT.COMMENTS IMP SPEC: NORMAL
PATH REPORT.COMMENTS IMP SPEC: NORMAL
PATH REPORT.FINAL DX SPEC: NORMAL
PATH REPORT.GROSS SPEC: NORMAL
PATH REPORT.MICROSCOPIC SPEC OTHER STN: NORMAL
PATH REPORT.RELEVANT HX SPEC: NORMAL
PHOTO IMAGE: NORMAL

## 2021-12-07 PROCEDURE — 88305 TISSUE EXAM BY PATHOLOGIST: CPT | Mod: 26 | Performed by: PATHOLOGY

## 2021-12-27 DIAGNOSIS — I10 HYPERTENSION GOAL BP (BLOOD PRESSURE) < 140/90: ICD-10-CM

## 2021-12-27 RX ORDER — AMLODIPINE BESYLATE 5 MG/1
TABLET ORAL
Qty: 180 TABLET | Refills: 3 | Status: SHIPPED | OUTPATIENT
Start: 2021-12-27 | End: 2022-11-28

## 2021-12-27 NOTE — TELEPHONE ENCOUNTER
Prescription approved per [facility] Refill Protocol.    Kyra Ruiz RN   [facility]

## 2022-01-16 ASSESSMENT — SLEEP AND FATIGUE QUESTIONNAIRES
HOW LIKELY ARE YOU TO NOD OFF OR FALL ASLEEP WHILE SITTING QUIETLY AFTER LUNCH WITHOUT ALCOHOL: SLIGHT CHANCE OF DOZING
HOW LIKELY ARE YOU TO NOD OFF OR FALL ASLEEP WHEN YOU ARE A PASSENGER IN A CAR FOR AN HOUR WITHOUT A BREAK: SLIGHT CHANCE OF DOZING
HOW LIKELY ARE YOU TO NOD OFF OR FALL ASLEEP WHILE SITTING INACTIVE IN A PUBLIC PLACE: WOULD NEVER DOZE
HOW LIKELY ARE YOU TO NOD OFF OR FALL ASLEEP WHILE WATCHING TV: SLIGHT CHANCE OF DOZING
HOW LIKELY ARE YOU TO NOD OFF OR FALL ASLEEP WHILE SITTING AND TALKING TO SOMEONE: WOULD NEVER DOZE
HOW LIKELY ARE YOU TO NOD OFF OR FALL ASLEEP WHILE SITTING AND READING: SLIGHT CHANCE OF DOZING
HOW LIKELY ARE YOU TO NOD OFF OR FALL ASLEEP IN A CAR, WHILE STOPPED FOR A FEW MINUTES IN TRAFFIC: WOULD NEVER DOZE
HOW LIKELY ARE YOU TO NOD OFF OR FALL ASLEEP WHILE LYING DOWN TO REST IN THE AFTERNOON WHEN CIRCUMSTANCES PERMIT: MODERATE CHANCE OF DOZING

## 2022-01-17 ENCOUNTER — VIRTUAL VISIT (OUTPATIENT)
Dept: SLEEP MEDICINE | Facility: CLINIC | Age: 72
End: 2022-01-17
Payer: COMMERCIAL

## 2022-01-17 VITALS — BODY MASS INDEX: 34.82 KG/M2 | WEIGHT: 280 LBS | HEIGHT: 75 IN

## 2022-01-17 DIAGNOSIS — G47.33 OSA ON CPAP: Primary | ICD-10-CM

## 2022-01-17 PROCEDURE — 99213 OFFICE O/P EST LOW 20 MIN: CPT | Mod: 95 | Performed by: INTERNAL MEDICINE

## 2022-01-17 ASSESSMENT — MIFFLIN-ST. JEOR: SCORE: 2120.7

## 2022-01-17 NOTE — PROGRESS NOTES
Juan Jose is a 71 year old who is being evaluated via a billable video visit.      How would you like to obtain your AVS? MyChart  If the video visit is dropped, the invitation should be resent by: Send to e-mail at: [email or web address]  Will anyone else be joining your video visit? No        Video-Visit Details    Type of service:  Video Visit  Video Start Time: 211pm  Video End Time:225 pm  Originating Location (pt. Location): Home    Distant Location (provider location):  [facility] [place]     Platform used for Video Visit: [facility]     Sleep clinic follow up visit note    Date on this visit: 1/17/2022    Primary Physician: Alphonso Grover     Chief complaint: Follow-up of BUCK, CPAP return visit    Juan Jose Razo 71 year old with PMH of  HTN, GERD, and severe BUCK who presents for video visit to follow-up of BUCK.   Date of last sleep clinic visit was 12/4/2020.     Previous sleep study results:  HST performed on 10/12/17  AHI of 32.7/hr.      He was set up on October 27, 2017 with Resmed AirSense 10 Auto pressures set at 5-15 cm H2O. (His CPAP machine needed repair  in Feb 2020 and got his device back in May 2020). There have not been any mechanical concerns with the device.    He is currently being treated with auto titrating CPAP with pressure settings between 10 to 20 cm water.   He reports using the CPAP regularly during sleep.   He  uses fullface mask.  He reports that during the middle of the night, he feels as if there is too much of pressure blowing from the device.  He reports good sleep quality with the CPAP use and says the CPAP has been a life saver.  He and his wife sleep in separate rooms so he doesn't  about snoring with device.  There are no reports of awakenings due to gasping for air or choking with the CPAP use.   He reports feeling refreshed upon awakening most mornings.  He denies fatigue or excessive daytime sleepiness.   He  denies drowsiness while driving.    Colon sleepiness score is 6/24.       DOWNLOADABLE COMPLIANCE DATA:   ResMed   Auto-PAP 10.0 - 20.0 cmH2O 30 day usage data:    100% of days with > 4 hours of use. 0/30 days with no use.   Average use 531 minutes per day.   95%ile Leak 39.72 L/min.   CPAP 95% pressure 16.3 cm.   AHI 2.82 events per hour.     Allergies:    No Known Allergies    Medications:    Current Outpatient Medications   Medication Sig Dispense Refill     amLODIPine (NORVASC) 5 MG tablet TAKE  2 TABLETS BY MOUTH ONCE DAILY 180 tablet 3     cholecalciferol (VITAMIN D3) 1000 UNIT tablet Take by mouth daily       FAMOTIDINE 20 MG PO tablet TAKE TWO TABLETS BY MOUTH ONCE DAILY 60 tablet 0     metoprolol succinate ER (TOPROL-XL) 100 MG 24 hr tablet Take 1 tablet (100 mg) by mouth daily 90 tablet 3     Multiple Vitamins-Minerals (CENTRUM SILVER ULTRA MENS PO)        nystatin (MYCOSTATIN) 822101 UNIT/GM external cream Apply topically 2 times daily Mix with steroid twice daily during flare of rash until resolved. 60 g 1     omeprazole (PRILOSEC) 40 MG DR capsule TAKE ONE CAPSULE BY MOUTH ONCE DAILY 90 capsule 3     sildenafil (REVATIO) 20 MG tablet Take 2-3 as needed for ED 20 tablet 0     triamcinolone (KENALOG) 0.025 % external ointment Apply topically 2 times daily To groin rash until resolved. 80 g 3       Problem List:  Patient Active Problem List    Diagnosis Date Noted     Morbid obesity (H) 05/23/2018     Priority: Medium     Multiple benign nevi 09/06/2015     Priority: Medium     Seborrheic keratoses 09/06/2015     Priority: Medium     Skin cancer screening 09/06/2015     Priority: Medium     Skin exam, screening for cancer 10/24/2013     Priority: Medium     Advanced directives, counseling/discussion 01/23/2013     Priority: Medium     Patient states has Advance Directive and will bring in a copy to clinic. 1/23/2013          CARDIOVASCULAR SCREENING; LDL GOAL LESS THAN 130 03/06/2012     Priority: Medium      Hypertension goal BP (blood pressure) < 140/90 03/06/2012     Priority: Medium     GERD (gastroesophageal reflux disease) 03/06/2012     Priority: Medium     Seasonal allergic rhinitis 03/06/2012     Priority: Medium        Past Medical/Surgical History:  Past Medical History:   Diagnosis Date     Acid reflux      Allergies      HTN (hypertension) 1990     Squamous cell carcinoma      Varicose veins      Past Surgical History:   Procedure Laterality Date     COLONOSCOPY  2001, 2006, 2010    polyp     COLONOSCOPY N/A 12/6/2021    Procedure: COLONOSCOPY, WITH POLYPECTOMY AND BIOPSY;  Surgeon: Michael Antonio MD;  Location: [facility]     keratinous cyst  12/09     LIGATE VEIN VARICOSE, PHLEBECTOMY MULTIPLE STAB, COMBINED  1995     MOHS MICROGRAPHIC PROCEDURE       NO HISTORY OF SURGERY  10/24/13    derm       Social History:  Social History     Socioeconomic History     Marital status:      Spouse name: Not on file     Number of children: Not on file     Years of education: Not on file     Highest education level: Not on file   Occupational History     Not on file   Tobacco Use     Smoking status: Never Smoker     Smokeless tobacco: Never Used   Substance and Sexual Activity     Alcohol use: Yes     Comment: 1-2 beers a week      Drug use: No     Sexual activity: Yes     Partners: Female   Other Topics Concern     Parent/sibling w/ CABG, MI or angioplasty before 65F 55M? No   Social History Narrative     Not on file     Social Determinants of Health     Financial Resource Strain: Not on file   Food Insecurity: Not on file   Transportation Needs: Not on file   Physical Activity: Not on file   Stress: Not on file   Social Connections: Not on file   Intimate Partner Violence: Not on file   Housing Stability: Not on file       Family History:  Family History   Problem Relation Age of Onset     Cancer Mother         thyroid     Breast Cancer Sister      Skin Cancer Father      Melanoma No family hx of   "        Physical Examination:  Vitals: Ht 1.905 m (6' 3\")   Wt 127 kg (280 lb)   BMI 35.00 kg/m    BMI= Body mass index is 35 kg/m .    [place] Total Score 1/16/2022   Total score - [place] 6     General: No apparent distress, appropriately groomed  Chest: No cough, no audible wheezing, able to talk in full sentences  Psych: coherent speech, normal rate and volume, able to articulate logical thoughts, able   to abstract reason, no tangential thoughts, no hallucinations   or delusions  His  affect is normal  Neuro:  Mental status: Alert and  Oriented X 3  Speech: normal     Impression/Plan:  Obstructive sleep apnea: Pt reports adequate compliance with PAP therapy and reports positive benefits with PAP use.   BUCK is adequately controlled with Auto CPAP at the current settings per compliance DL.   Prescription provided for renewal of PAP supplies and DME orders were generated for revision of the pressure settings on the auto CPAP to range between 10 to 17 cm water.  With regards to the air leaks, patient was instructed to replace the cushion for his full facemask regularly.  Recommended him to continue using the CPAP regularly during sleep and instructed  to get the supplies for the PAP replaced regularly.    Patient instructed to remember to bring PAP with him if hospitalized and if anticipating procedure that requires sedation/surgery to be sure to discuss with the provider/surgeon that he has sleep apnea and uses PAP therapy.      Obesity: We discussed weight management with healthy diet, and exercise.    Patient was strongly advised to avoid driving, operating any heavy machinery or other hazardous situations while drowsy or sleepy.  Patient was counseled on the importance of driving while alert, to pull over if drowsy, or nap before getting into the vehicle if sleepy.      Plan is to follow up in 1 year or sooner if any concerns.    CC: No ref. provider found    The above note was dictated using voice " "recognition software. Although reviewed after completion, some word and grammatical error may remain . Please contact the author for any clarifications.    \"I spent a total of  20 minutes with Juan Jose Razo during today's video visit. Most of this time was spent counseling the patient and  coordinating care regarding  BUCK, CPAP therapy, renewal of PAP supplies and revision of pressure settings on CPAP,  weight management , going over PAP download, chart review  including documentation and further activities as noted above.\"      Ernesto Blankenship MD  [facility] [address] [address] [phone]  Dept: [phone]                "

## 2022-01-17 NOTE — PATIENT INSTRUCTIONS
Your BMI is Body mass index is 35 kg/m .    What is BMI?  Body mass index (BMI) is one way to tell whether you are at a healthy weight, overweight, or obese. It measures your weight in relation to your height.  A BMI of 18.5 to 24.9 is in the healthy range. A person with a BMI of 25 to 29.9 is considered overweight, and someone with a BMI of 30 or greater is considered obese.  Another way to find out if you are at risk for health problems caused by overweight and obesity is to measure your waist. If you are a woman and your waist is more than 35 inches, or if you are a man and your waist is more than 40 inches, your risk of disease may be higher.  More than two-thirds of American adults are considered overweight or obese. Being overweight or obese increases the risk for further weight gain.  Excess weight may lead to heart disease and diabetes. Creating and following plans for healthy eating and physical activity may help you improve your health.    Methods for maintaining or losing weight.  Weight control is part of healthy lifestyle and includes exercise, emotional health, and healthy eating habits.  Careful eating habits lifelong is the mainstay of weight control.  Though there are significant health benefits from weight loss, long-term weight loss with diet alone may be very difficult to achieve- studies show long-term success with dietary management in less than 10% of people. Attaining a healthy weight may be especially difficult to achieve in those with severe obesity. In some cases, medications, devices and surgical management might be considered.    What can you do?  If you are overweight or obese and are interested in methods for weight loss, you should discuss this with your provider. In addition, we recommend that you review healthy life styles and methods for weight loss available through the National Institutes of Health patient information sites:    http://win.niddk.nih.gov/publications/index.htm

## 2022-01-18 ENCOUNTER — DOCUMENTATION ONLY (OUTPATIENT)
Dept: SLEEP MEDICINE | Facility: CLINIC | Age: 72
End: 2022-01-18
Payer: COMMERCIAL

## 2022-01-18 NOTE — PROGRESS NOTES
1/17/2022- JL- PRESSURE CHANGE COMPLETED IN AIRVIEW. VM LEFT ON PATIENT MACHINE RE: PRESSURE CHANGE.

## 2022-01-18 NOTE — NURSING NOTE
Return in about 1 year reminder created and to be send via [employer]. DME order automatically route to [facility].      TIM Armijo  [facility] [place]

## 2022-02-14 ENCOUNTER — OFFICE VISIT (OUTPATIENT)
Dept: DERMATOLOGY | Facility: CLINIC | Age: 72
End: 2022-02-14
Payer: COMMERCIAL

## 2022-02-14 DIAGNOSIS — L30.4 INTERTRIGO: ICD-10-CM

## 2022-02-14 DIAGNOSIS — D48.9 NEOPLASM OF UNCERTAIN BEHAVIOR: Primary | ICD-10-CM

## 2022-02-14 DIAGNOSIS — L82.1 SEBORRHEIC KERATOSIS: ICD-10-CM

## 2022-02-14 DIAGNOSIS — D18.01 CHERRY ANGIOMA: ICD-10-CM

## 2022-02-14 DIAGNOSIS — L82.0 SEBORRHEIC KERATOSIS, INFLAMED: ICD-10-CM

## 2022-02-14 DIAGNOSIS — D22.9 MULTIPLE BENIGN NEVI: ICD-10-CM

## 2022-02-14 DIAGNOSIS — L81.4 LENTIGINES: ICD-10-CM

## 2022-02-14 PROCEDURE — 99213 OFFICE O/P EST LOW 20 MIN: CPT | Mod: 25 | Performed by: PHYSICIAN ASSISTANT

## 2022-02-14 PROCEDURE — 88305 TISSUE EXAM BY PATHOLOGIST: CPT | Mod: TC | Performed by: PHYSICIAN ASSISTANT

## 2022-02-14 PROCEDURE — 88305 TISSUE EXAM BY PATHOLOGIST: CPT | Mod: 26 | Performed by: PATHOLOGY

## 2022-02-14 PROCEDURE — 11102 TANGNTL BX SKIN SINGLE LES: CPT | Mod: XS | Performed by: PHYSICIAN ASSISTANT

## 2022-02-14 PROCEDURE — 11103 TANGNTL BX SKIN EA SEP/ADDL: CPT | Mod: XS | Performed by: PHYSICIAN ASSISTANT

## 2022-02-14 PROCEDURE — 17110 DESTRUCTION B9 LES UP TO 14: CPT | Performed by: PHYSICIAN ASSISTANT

## 2022-02-14 RX ORDER — NYSTATIN 100000 U/G
CREAM TOPICAL
Qty: 60 G | Refills: 1 | Status: SHIPPED | OUTPATIENT
Start: 2022-02-14 | End: 2022-08-17

## 2022-02-14 RX ORDER — TRIAMCINOLONE ACETONIDE 0.25 MG/G
OINTMENT TOPICAL 2 TIMES DAILY
Qty: 80 G | Refills: 3 | Status: SHIPPED | OUTPATIENT
Start: 2022-02-14 | End: 2022-08-17

## 2022-02-14 ASSESSMENT — PAIN SCALES - GENERAL: PAINLEVEL: NO PAIN (0)

## 2022-02-14 NOTE — PATIENT INSTRUCTIONS
Wound Care After a Biopsy    What is a skin biopsy?  A skin biopsy allows the doctor to examine a very small piece of tissue under the microscope to determine the diagnosis and the best treatment for the skin condition. A local anesthetic (numbing medicine)  is injected with a very small needle into the skin area to be tested. A small piece of skin is taken from the area. Sometimes a suture (stitch) is used.     What are the risks of a skin biopsy?  I will experience scar, bleeding, swelling, pain, crusting and redness. I may experience incomplete removal or recurrence. Risks of this procedure are excessive bleeding, bruising, infection, nerve damage, numbness, thick (hypertrophic or keloidal) scar and non-diagnostic biopsy.    How should I care for my wound for the first 24 hours?    Keep the wound dry and covered for 24 hours    If it bleeds, hold direct pressure on the area for 15 minutes. If bleeding does not stop then go to the emergency room    Avoid strenuous exercise the first 1-2 days or as your doctor instructs you    How should I care for the wound after 24 hours?    After 24 hours, remove the bandage    You may bathe or shower as normal    If you had a scalp biopsy, you can shampoo as usual and can use shower water to clean the biopsy site daily    Clean the wound twice a day with gentle soap and water    Do not scrub, be gentle    Apply white petroleum/Vaseline after cleaning the wound with a cotton swab or a clean finger, and keep the site covered with a Bandaid /bandage. Bandages are not necessary with a scalp biopsy    If you are unable to cover the site with a Bandaid /bandage, re-apply ointment 2-3 times a day to keep the site moist. Moisture will help with healing    Avoid strenuous activity for first 1-2 days    Avoid lakes, rivers, pools, and oceans until the stitches are removed or the site is healed    How do I clean my wound?    Wash hands thoroughly with soap or use hand  before all  wound care    Clean the wound with gentle soap and water    Apply white petroleum/Vaseline  to wound after it is clean    Replace the Bandaid /bandage to keep the wound covered for the first few days or as instructed by your doctor    If you had a scalp biopsy, warm shower water to the area on a daily basis should suffice    What should I use to clean my wound?     Cotton-tipped applicators (Qtips )    White petroleum jelly (Vaseline ). Use a clean new container and use Q-tips to apply.    Bandaids   as needed    Gentle soap     How should I care for my wound long term?    Do not get your wound dirty    Keep up with wound care for one week or until the area is healed.    A small scab will form and fall off by itself when the area is completely healed. The area will be red and will become pink in color as it heals. Sun protection is very important for how your scar will turn out. Sunscreen with an SPF 30 or greater is recommended once the area is healed.      You should have some soreness but it should be mild and slowly go away over several days. Talk to your doctor about using tylenol for pain,    When should I call my doctor?  If you have increased:     Pain or swelling    Pus or drainage (clear or slightly yellow drainage is ok)    Temperature over 100F    Spreading redness or warmth around wound    When will I hear about my results?  The biopsy results can take 2 weeks to come back.  Your results will automatically release to [employer] before your provider has even reviewed them.  The clinic will call you with the results, send you a [employer] message, or have you schedule a follow-up clinic or phone time to discuss the results.  Contact our clinics if you do not hear from us in 2 weeks.    Who should I call with questions?    [facility]: [phone]    [facility]: [phone]    For urgent needs outside of business hours call the [facility] at  [phone] and ask for the dermatology resident on call    Cryotherapy    What is it?    Use of a very cold liquid, such as liquid nitrogen, to freeze and destroy abnormal skin cells that need to be removed    What should I expect?    Tenderness and redness    A small blister that might grow and fill with dark purple blood. There may be crusting.    More than one treatment may be needed if the lesions do not go away.    How do I care for the treated area?    Gently wash the area with your hands when bathing.    Use a thin layer of Vaseline to help with healing. You may use a Band-Aid.     The area should heal within 7-10 days and may leave behind a pink or lighter color.     Do not use an antibiotic or Neosporin ointment.     You may take acetaminophen (Tylenol) for pain.     Call your doctor if you have:    Severe pain    Signs of infection (warmth, redness, cloudy yellow drainage, and or a bad smell)    Questions or concerns    Who should I call with questions?       [facility]: [phone]       [facility]: [phone]       For urgent needs outside of business hours call the [facility] at [phone] and ask for the dermatology resident on call

## 2022-02-14 NOTE — PROGRESS NOTES
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 Benign bx:  - SK, Left parietal scalp, 9/12/2018  # Skin cancer screening 8/11/2021   # ISK s/p cryo 8/11/2021  # Digital myxoid cyst, left index finger, monitoring    ____________________________________________    Assessment & Plan:    # NUBs  - L upper posterior thigh, ddx acrochordon vs nevus lipomatosus superficialis  - L top shoulder, ddx DF vs BCC  - Shave biopsy x2 today, see procedure note below    # ISK, L upper forearm  - Cryotherapy today, see procedure note below    # Intertrigo. No active eruption on exam.   - Continue triamcinolone 0.025% ointment, apply BID when flaring  - Continue Nystatin cream BID mixed with topical steroid and apply BID when flaring    # History of NMSC. No evidence of recurrent disease.  - Continue photoprotection - recommend SPF 30 or higher with frequent reapplication  - Continue yearly skin exams  - Advised to monitor for changing, non-healing, bleeding, painful, changing, or otherwise symptomatic lesions    # Multiple benign nevi. Solar lentigines.   - No concerning lesions today  - Monitor for ABCDEs of melanoma   - Continue sun protection - recommend SPF 30 or higher with frequent application   - Return sooner if noticing changing or symptomatic lesions    # Seborrheic keratosis. Cherry angioma.  Discussed the  natural history and benign nature of this lesion. Reassurance provided that no additional treatment is necessary.     Procedures Performed:   - Shave biopsy procedure note, location(s): See above. After discussion of benefits and risks including but not limited to bleeding, infection, scar, incomplete removal, recurrence, and non-diagnostic biopsy, written consent and photographs were obtained. The area was cleaned with isopropyl alcohol. 0.5mL of 1% lidocaine with epinephrine was injected to obtain adequate anesthesia of lesion(s). Shave biopsy at site(s) performed. Hemostasis was achieved with aluminium chloride. Petrolatum ointment and a sterile dressing were applied. The patient tolerated the procedure and no complications were noted. The patient was provided with verbal and written post care instructions.   - Cryotherapy procedure note, location(s): See above. After verbal consent and discussion of risks and benefits including, but not limited to, dyspigmentation/scar, blister, and pain, 1 lesion(s) was(were) treated with 1-2 mm freeze border for 1-2 cycles with liquid nitrogen. Post cryotherapy instructions were provided.    Follow-up: 6 month(s) in-person, or earlier for new or changing lesions    Staff and Scribe:     Scribe Disclosure:  PABLO, Kathy Mendoza, am serving as a scribe to document services personally performed by Imelda Rincon PA-C based on data collection and the provider's statements to me.     Provider Disclosure:   The documentation recorded by the scribe accurately reflects the services I personally performed and the decisions made by me.    All risks, benefits and alternatives were discussed with patient.  Patient is in agreement and understands the assessment and plan.  All questions were answered.  Sun Screen Education was given.   Return to Clinic in 6 months or sooner as needed.   Imelda Rincon PA-C   [facility]    ____________________________________________    CC: Skin Check (pt states he is here for full body skin check.)    HPI:  Mr. Juan Jose Razo is a(n) 71 year old male who presents today as a return patient for [facility]. Last seen in dermatology on 11/10/21 by Dr. Barba at which point the patient elected to not pursue excision on a digital myxoid cyst on the L index finger.     Today, the patient states that the fluid from the cyst drained right before his appointment with derm surgery. Therefore, he did not pursue removal.     He has a lesion on his left leg that catches on clothing at time. There is also a lesion on his L forearm that is itchy.  He otherwise denies any particular lesions of concern. Regarding his groin rash, this improved with steroid creams. It is not currently active. Patient is otherwise feeling well, without additional skin concerns.    Labs Reviewed:  N/A    Physical Exam:  Vitals: There were no vitals taken for this visit.  SKIN: Full skin, which includes the head/face, both arms, chest, back, abdomen,both legs, genitalia and/or groin buttocks, digits and/or nails, was examined.  - L upper posterior thigh, there is a 6 mm pendulous colored lichenified papule.  - L top shoulder, pearly indurated papule.  - No erythema to the inguinal folds.   - There are dome shaped bright red papules on the trunk and extremities.   - Multiple regular brown pigmented macules and papules are identified on the trunk and extremities.   - Scattered brown macules on sun exposed areas.  - There are waxy stuck on tan to brown papules on the trunk and extremities.   - There is a tan to brown waxy stuck on papule with surrounding erythema on the L upper forearm.   - Linear scar the the R helix without nodularity or scale.   - No other lesions of concern on areas examined.               Medications:  Current Outpatient Medications   Medication     amLODIPine (NORVASC) 5 MG tablet     cholecalciferol (VITAMIN D3) 1000  UNIT tablet     FAMOTIDINE 20 MG PO tablet     metoprolol succinate ER (TOPROL-XL) 100 MG 24 hr tablet     Multiple Vitamins-Minerals (CENTRUM SILVER ULTRA MENS PO)     nystatin (MYCOSTATIN) 608186 UNIT/GM external cream     omeprazole (PRILOSEC) 40 MG DR capsule     sildenafil (REVATIO) 20 MG tablet     triamcinolone (KENALOG) 0.025 % external ointment     No current facility-administered medications for this visit.      Past Medical History:   Patient Active Problem List   Diagnosis     CARDIOVASCULAR SCREENING; LDL GOAL LESS THAN 130     Hypertension goal BP (blood pressure) < 140/90     GERD (gastroesophageal reflux disease)     Seasonal allergic rhinitis     Advanced directives, counseling/discussion     Skin exam, screening for cancer     Multiple benign nevi     Seborrheic keratoses     Skin cancer screening     Morbid obesity (H)     Past Medical History:   Diagnosis Date     Acid reflux      Allergies      HTN (hypertension) 1990     Squamous cell carcinoma      Varicose veins         CC Imelda Rincon PA-C  [address]  [place] [address] on close of this encounter.

## 2022-02-14 NOTE — LETTER
2/14/2022       RE: Juan Jose Razo  [address] [address]     Dear Colleague,    Thank you for referring your patient, Juan Jose Razo, to the [facility]ER[facility] [place] at [facility]. Please see a copy of my visit note below.    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 Benign bx:  - SK, Left parietal scalp, 9/12/2018  # Skin cancer screening 8/11/2021   # ISK s/p cryo 8/11/2021  # Digital myxoid cyst, left index finger, monitoring    ____________________________________________    Assessment & Plan:    # NUBs  - L upper posterior thigh, ddx acrochordon vs nevus lipomatosus superficialis  - L top shoulder, ddx DF vs BCC  - Shave biopsy x2 today, see procedure note below    # ISK, L upper forearm  - Cryotherapy today, see procedure note below    # Intertrigo. No active eruption on exam.   - Continue triamcinolone 0.025% ointment, apply BID when flaring  - Continue Nystatin cream BID mixed with topical steroid and apply BID when flaring    # History of NMSC. No evidence of recurrent disease.  - Continue photoprotection - recommend SPF 30 or higher with frequent reapplication  - Continue yearly skin exams  - Advised to monitor for changing, non-healing, bleeding, painful, changing, or otherwise  symptomatic lesions    # Multiple benign nevi. Solar lentigines.   - No concerning lesions today  - Monitor for ABCDEs of melanoma   - Continue sun protection - recommend SPF 30 or higher with frequent application   - Return sooner if noticing changing or symptomatic lesions    # Seborrheic keratosis. Cherry angioma.  Discussed the natural history and benign nature of this lesion. Reassurance provided that no additional treatment is necessary.     Procedures Performed:   - Shave biopsy procedure note, location(s): See above. After discussion of benefits and risks including but not limited to bleeding, infection, scar, incomplete removal, recurrence, and non-diagnostic biopsy, written consent and photographs were obtained. The area was cleaned with isopropyl alcohol. 0.5mL of 1% lidocaine with epinephrine was injected to obtain adequate anesthesia of lesion(s). Shave biopsy at site(s) performed. Hemostasis was achieved with aluminium chloride. Petrolatum ointment and a sterile dressing were applied. The patient tolerated the procedure and no complications were noted. The patient was provided with verbal and written post care instructions.   - Cryotherapy procedure note, location(s): See above. After verbal consent and discussion of risks and benefits including, but not limited to, dyspigmentation/scar, blister, and pain, 1 lesion(s) was(were) treated with 1-2 mm freeze border for 1-2 cycles with liquid nitrogen. Post cryotherapy instructions were provided.    Follow-up: 6 month(s) in-person, or earlier for new or changing lesions    Staff and Scribe:     Scribe Disclosure:  Kathy SHAH, am serving as a scribe to document services personally performed by Imelda Rincon PA-C based on data collection and the provider's statements to me.     Provider Disclosure:   The documentation recorded by the scribe accurately reflects the services I personally performed and the decisions made by me.    All risks,  benefits and alternatives were discussed with patient.  Patient is in agreement and understands the assessment and plan.  All questions were answered.  Sun Screen Education was given.   Return to Clinic in 6 months or sooner as needed.   Imelda Rincon PA-C   [facility]   ____________________________________________    CC: Skin Check (pt states he is here for full body skin check.)    HPI:  Mr. Juan Jose Razo is a(n) 71 year old male who presents today as a return patient for [facility]. Last seen in dermatology on 11/10/21 by Dr. Barba at which point the patient elected to not pursue excision on a digital myxoid cyst on the L index finger.     Today, the patient states that the fluid from the cyst drained right before his appointment with derm surgery. Therefore, he did not pursue removal.     He has a lesion on his left leg that catches on clothing at time. There is also a lesion on his L forearm that is itchy.  He otherwise denies any particular lesions of concern. Regarding his groin rash, this improved with steroid creams. It is not currently active. Patient is otherwise feeling well, without additional skin concerns.    Labs Reviewed:  N/A    Physical Exam:  Vitals: There were no vitals taken for this visit.  SKIN: Full skin, which includes the head/face, both arms, chest, back, abdomen,both legs, genitalia and/or groin buttocks, digits and/or nails, was examined.  - L upper posterior thigh, there is a 6 mm pendulous colored lichenified papule.  - L top shoulder, pearly indurated papule.  - No erythema to the inguinal folds.   - There are dome shaped bright red papules on the trunk and extremities.   - Multiple regular brown pigmented macules and papules are identified on the trunk and extremities.   - Scattered brown macules on sun exposed areas.  - There are waxy stuck on tan to brown papules on the trunk and extremities.   - There is a tan to brown waxy stuck on papule with  surrounding erythema on the L upper forearm.   - Linear scar the the R helix without nodularity or scale.   - No other lesions of concern on areas examined.               Medications:  Current Outpatient Medications   Medication     amLODIPine (NORVASC) 5 MG tablet     cholecalciferol (VITAMIN D3) 1000 UNIT tablet     FAMOTIDINE 20 MG PO tablet     metoprolol succinate ER (TOPROL-XL) 100 MG 24 hr tablet     Multiple Vitamins-Minerals (CENTRUM SILVER ULTRA MENS PO)     nystatin (MYCOSTATIN) 144326 UNIT/GM external cream     omeprazole (PRILOSEC) 40 MG DR capsule     sildenafil (REVATIO) 20 MG tablet     triamcinolone (KENALOG) 0.025 % external ointment     No current facility-administered medications for this visit.      Past Medical History:   Patient Active Problem List   Diagnosis     CARDIOVASCULAR SCREENING; LDL GOAL LESS THAN 130     Hypertension goal BP (blood pressure) < 140/90     GERD (gastroesophageal reflux disease)     Seasonal allergic rhinitis     Advanced directives, counseling/discussion     Skin exam, screening for cancer     Multiple benign nevi     Seborrheic keratoses     Skin cancer screening     Morbid obesity (H)     Past Medical History:   Diagnosis Date     Acid reflux      Allergies      HTN (hypertension) 1990     Squamous cell carcinoma      Varicose veins         CC Imelda Rincon PA-C  [address]  [place] [address] on close of this encounter.

## 2022-02-15 LAB
PATH REPORT.COMMENTS IMP SPEC: NORMAL
PATH REPORT.COMMENTS IMP SPEC: NORMAL
PATH REPORT.FINAL DX SPEC: NORMAL
PATH REPORT.GROSS SPEC: NORMAL
PATH REPORT.MICROSCOPIC SPEC OTHER STN: NORMAL
PATH REPORT.RELEVANT HX SPEC: NORMAL

## 2022-04-18 DIAGNOSIS — I10 HYPERTENSION GOAL BP (BLOOD PRESSURE) < 140/90: ICD-10-CM

## 2022-04-19 RX ORDER — METOPROLOL SUCCINATE 100 MG/1
100 TABLET, EXTENDED RELEASE ORAL DAILY
Qty: 90 TABLET | Refills: 3 | Status: SHIPPED | OUTPATIENT
Start: 2022-04-19 | End: 2022-11-28

## 2022-04-19 NOTE — TELEPHONE ENCOUNTER
"Requested Prescriptions   Signed Prescriptions Disp Refills    metoprolol succinate ER (TOPROL-XL) 100 MG 24 hr tablet 90 tablet 3     Sig: TAKE 1 TABLET (100 MG) BY MOUTH DAILY       Beta-Blockers Protocol Passed - 4/18/2022  7:59 AM        Passed - Blood pressure under 140/90 in past 12 months     BP Readings from Last 3 Encounters:   12/06/21 134/86   11/22/21 133/84   08/19/21 125/79                 Passed - Patient is age 6 or older        Passed - Recent (12 mo) or future (30 days) visit within the authorizing provider's specialty     Patient has had an office visit with the authorizing provider or a provider within the authorizing providers department within the previous 12 mos or has a future within next 30 days. See \"Patient Info\" tab in inbasket, or \"Choose Columns\" in Meds & Orders section of the refill encounter.              Passed - Medication is active on med list           Fiorella Felix RN  [facility]     "

## 2022-08-17 ENCOUNTER — OFFICE VISIT (OUTPATIENT)
Dept: DERMATOLOGY | Facility: CLINIC | Age: 72
End: 2022-08-17
Payer: COMMERCIAL

## 2022-08-17 DIAGNOSIS — L82.1 SEBORRHEIC KERATOSES: ICD-10-CM

## 2022-08-17 DIAGNOSIS — L82.0 INFLAMED SEBORRHEIC KERATOSIS: ICD-10-CM

## 2022-08-17 DIAGNOSIS — D22.9 MULTIPLE PIGMENTED NEVI: ICD-10-CM

## 2022-08-17 DIAGNOSIS — L30.4 INTERTRIGO: ICD-10-CM

## 2022-08-17 DIAGNOSIS — Z85.89 HISTORY OF SQUAMOUS CELL CARCINOMA: ICD-10-CM

## 2022-08-17 DIAGNOSIS — L81.4 SOLAR LENTIGINOSIS: ICD-10-CM

## 2022-08-17 DIAGNOSIS — Z12.83 SKIN CANCER SCREENING: ICD-10-CM

## 2022-08-17 DIAGNOSIS — D18.01 CHERRY ANGIOMA: Primary | ICD-10-CM

## 2022-08-17 PROCEDURE — 99213 OFFICE O/P EST LOW 20 MIN: CPT | Mod: 25 | Performed by: PHYSICIAN ASSISTANT

## 2022-08-17 PROCEDURE — 17110 DESTRUCTION B9 LES UP TO 14: CPT | Performed by: PHYSICIAN ASSISTANT

## 2022-08-17 RX ORDER — TRIAMCINOLONE ACETONIDE 0.25 MG/G
OINTMENT TOPICAL 2 TIMES DAILY
Qty: 80 G | Refills: 3 | Status: SHIPPED | OUTPATIENT
Start: 2022-08-17 | End: 2023-02-20

## 2022-08-17 RX ORDER — NYSTATIN 100000 U/G
CREAM TOPICAL
Qty: 60 G | Refills: 1 | Status: SHIPPED | OUTPATIENT
Start: 2022-08-17 | End: 2023-02-20

## 2022-08-17 ASSESSMENT — PAIN SCALES - GENERAL: PAINLEVEL: NO PAIN (0)

## 2022-08-17 NOTE — LETTER
8/17/2022       RE: Juan Jose Razo  [address] [address]     Dear Colleague,    Thank you for referring your patient, Juan Jose Razo, to the [facility] [place] at [facility]. Please see a copy of my visit note below.    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 Benign bx:  - SK, Left parietal scalp, 9/12/2018  # Skin cancer screening 8/11/2021   # ISK s/p cryo 8/11/2021  # Digital myxoid cyst, left index finger, monitoring    ____________________________________________    Assessment & Plan:      # ISK, bl neck (x2)  - Cryotherapy today, see procedure note below    # Intertrigo. No active eruption on exam.   - Continue triamcinolone 0.025% ointment, apply BID when flaring  - Continue Nystatin cream BID mixed with topical steroid and apply BID when flaring    # History of NMSC. No evidence of recurrent disease.  - Continue photoprotection - recommend SPF 30 or higher with frequent reapplication  - Continue yearly skin exams  - Advised to monitor for changing, non-healing, bleeding, painful, changing, or otherwise symptomatic lesions    # Multiple benign nevi. Solar lentigines.   - No concerning lesions today  - Monitor for ABCDEs of melanoma   - Continue sun protection - recommend SPF 30 or  higher with frequent application   - Return sooner if noticing changing or symptomatic lesions    # Seborrheic keratosis. Cherry angioma.  Discussed the natural history and benign nature of this lesion. Reassurance provided that no additional treatment is necessary.     Procedures Performed:   .   - Cryotherapy procedure note, location(s): bl neck After verbal consent and discussion of risks and benefits including, but not limited to, dyspigmentation/scar, blister, and pain, 2 lesion(s) was(were) treated with 1-2 mm freeze border for 1-2 cycles with liquid nitrogen. Post cryotherapy instructions were provided.    Follow-up: 6 month(s) in-person, or earlier for new or changing lesions    Staff:  All risks, benefits and alternatives were discussed with patient.  Patient is in agreement and understands the assessment and plan.  All questions were answered.  Sun Screen Education was given.   Return to Clinic in 6 months or sooner as needed.   Imelda Rincon PA-C   [facility]   ____________________________________________    CC: Skin Check (Juan Jose is here for a 6 month skin check.  No spots of concern.  )    HPI:  Mr. Juan Jose Razo is a(n) 71 year old male who presents today as a return patient for [facility]. Last seen in dermatology by me on 2/14/22 when he had a skin check and had biopsies on his left upper posterior thigh and left top Shoulder.  These returned as a skin tag and a macular seborrheic keratosis, respectively.  He also had cryotherapy on his left upper forearm, an inflamed seborrheic keratosis.      Today, he reports he has some irritated growths on both sides of his neck.  They rub against his shirt collars and bother him he can become painful.  They are reddish appearing today.    Other than that he has not had any skin issues.  His rashes stay under control in his groin when he uses the triamcinolone and nystatin.  He rarely has to use these.   Patient is otherwise  feeling well, without additional skin concerns.    Labs Reviewed:  N/A    Physical Exam:  Vitals: There were no vitals taken for this visit.  SKIN: Full skin, which includes the head/face, both arms, chest, back, abdomen,both legs, genitalia and/or groin buttocks, digits and/or nails, was examined.  - No erythema to the inguinal folds.   - There are dome shaped bright red papules on the trunk and extremities.   - Multiple regular brown pigmented macules and papules are identified on the trunk and extremities.   - Scattered brown macules on sun exposed areas.  - There are waxy stuck on tan to brown papules on the trunk and extremities.   - There is a tan to brown waxy stuck on papule with surrounding erythema on the bl neck x 2.   - Linear scar the the R helix without nodularity or scale.   - No other lesions of concern on areas examined.               Medications:  Current Outpatient Medications   Medication     amLODIPine (NORVASC) 5 MG tablet     FAMOTIDINE 20 MG PO tablet     metoprolol succinate ER (TOPROL-XL) 100 MG 24 hr tablet     Multiple Vitamins-Minerals (CENTRUM SILVER ULTRA MENS PO)     nystatin (MYCOSTATIN) 622178 UNIT/GM external cream     omeprazole (PRILOSEC) 40 MG DR capsule     sildenafil (REVATIO) 20 MG tablet     triamcinolone (KENALOG) 0.025 % external ointment     vitamin D3 (CHOLECALCIFEROL) 1000 units (25 mcg) tablet     No current facility-administered medications for this visit.      Past Medical History:   Patient Active Problem List   Diagnosis     CARDIOVASCULAR SCREENING; LDL GOAL LESS THAN 130     Hypertension goal BP (blood pressure) < 140/90     GERD (gastroesophageal reflux disease)     Seasonal allergic rhinitis     Advanced directives, counseling/discussion     Skin exam, screening for cancer     Multiple benign nevi     Seborrheic keratoses     Skin cancer screening     Morbid obesity (H)     Past Medical History:   Diagnosis Date     Acid reflux      Allergies      HTN  (hypertension) 1990     Squamous cell carcinoma      Varicose veins         CC Imelda Rincon PA-C  [address]  [place] [address] on close of this encounter.

## 2022-08-17 NOTE — NURSING NOTE
Dermatology Rooming Note    Juan Jose Razo's goals for this visit include:   Chief Complaint   Patient presents with     Skin Check     Juan Jose is here for a 6 month skin check.  No spots of concern.       Briseyda Avery, CMA

## 2022-08-17 NOTE — PATIENT INSTRUCTIONS
Cryotherapy    What is it?  Use of a very cold liquid, such as liquid nitrogen, to freeze and destroy abnormal skin cells that need to be removed    What should I expect?  Tenderness and redness  A small blister that might grow and fill with dark purple blood. There may be crusting.  More than one treatment may be needed if the lesions do not go away.    How do I care for the treated area?  Gently wash the area with your hands when bathing.  Use a thin layer of Vaseline to help with healing. You may use a Band-Aid.   The area should heal within 7-10 days and may leave behind a pink or lighter color.   Do not use an antibiotic or Neosporin ointment.   You may take acetaminophen (Tylenol) for pain.     Call your doctor if you have:  Severe pain  Signs of infection (warmth, redness, cloudy yellow drainage, and or a bad smell)  Questions or concerns    Who should I call with questions?      [facility]: [phone]      [facility]: [phone]      For urgent needs outside of business hours call the [facility] at [phone] and ask for the dermatology resident on call

## 2022-08-17 NOTE — PROGRESS NOTES
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 Benign bx:  - SK, Left parietal scalp, 9/12/2018  # Skin cancer screening 8/11/2021   # ISK s/p cryo 8/11/2021  # Digital myxoid cyst, left index finger, monitoring    ____________________________________________    Assessment & Plan:      # ISK, bl neck (x2)  - Cryotherapy today, see procedure note below    # Intertrigo. No active eruption on exam.   - Continue triamcinolone 0.025% ointment, apply BID when flaring  - Continue Nystatin cream BID mixed with topical steroid and apply BID when flaring    # History of NMSC. No evidence of recurrent disease.  - Continue photoprotection - recommend SPF 30 or higher with frequent reapplication  - Continue yearly skin exams  - Advised to monitor for changing, non-healing, bleeding, painful, changing, or otherwise symptomatic lesions    # Multiple benign nevi. Solar lentigines.   - No concerning lesions today  - Monitor for ABCDEs of melanoma   - Continue sun protection - recommend SPF 30 or higher with frequent application   - Return sooner if noticing changing or symptomatic lesions    # Seborrheic keratosis. Cherry angioma.  Discussed the natural history and benign nature of this lesion. Reassurance provided that no additional treatment is necessary.     Procedures Performed:   .   - Cryotherapy procedure  note, location(s): bl neck After verbal consent and discussion of risks and benefits including, but not limited to, dyspigmentation/scar, blister, and pain, 2 lesion(s) was(were) treated with 1-2 mm freeze border for 1-2 cycles with liquid nitrogen. Post cryotherapy instructions were provided.    Follow-up: 6 month(s) in-person, or earlier for new or changing lesions    Staff:  All risks, benefits and alternatives were discussed with patient.  Patient is in agreement and understands the assessment and plan.  All questions were answered.  Sun Screen Education was given.   Return to Clinic in 6 months or sooner as needed.   Imelda Rincon PA-C   [facility]   ____________________________________________    CC: Skin Check (Juan Jose is here for a 6 month skin check.  No spots of concern.  )    HPI:  Mr. Juan Jose Razo is a(n) 71 year old male who presents today as a return patient for [facility]. Last seen in dermatology by me on 2/14/22 when he had a skin check and had biopsies on his left upper posterior thigh and left top Shoulder.  These returned as a skin tag and a macular seborrheic keratosis, respectively.  He also had cryotherapy on his left upper forearm, an inflamed seborrheic keratosis.      Today, he reports he has some irritated growths on both sides of his neck.  They rub against his shirt collars and bother him he can become painful.  They are reddish appearing today.    Other than that he has not had any skin issues.  His rashes stay under control in his groin when he uses the triamcinolone and nystatin.  He rarely has to use these.   Patient is otherwise feeling well, without additional skin concerns.    Labs Reviewed:  N/A    Physical Exam:  Vitals: There were no vitals taken for this visit.  SKIN: Full skin, which includes the head/face, both arms, chest, back, abdomen,both legs, genitalia and/or groin buttocks, digits and/or nails, was examined.  - No erythema to the  inguinal folds.   - There are dome shaped bright red papules on the trunk and extremities.   - Multiple regular brown pigmented macules and papules are identified on the trunk and extremities.   - Scattered brown macules on sun exposed areas.  - There are waxy stuck on tan to brown papules on the trunk and extremities.   - There is a tan to brown waxy stuck on papule with surrounding erythema on the bl neck x 2.   - Linear scar the the R helix without nodularity or scale.   - No other lesions of concern on areas examined.               Medications:  Current Outpatient Medications   Medication     amLODIPine (NORVASC) 5 MG tablet     FAMOTIDINE 20 MG PO tablet     metoprolol succinate ER (TOPROL-XL) 100 MG 24 hr tablet     Multiple Vitamins-Minerals (CENTRUM SILVER ULTRA MENS PO)     nystatin (MYCOSTATIN) 744013 UNIT/GM external cream     omeprazole (PRILOSEC) 40 MG DR capsule     sildenafil (REVATIO) 20 MG tablet     triamcinolone (KENALOG) 0.025 % external ointment     vitamin D3 (CHOLECALCIFEROL) 1000 units (25 mcg) tablet     No current facility-administered medications for this visit.      Past Medical History:   Patient Active Problem List   Diagnosis     CARDIOVASCULAR SCREENING; LDL GOAL LESS THAN 130     Hypertension goal BP (blood pressure) < 140/90     GERD (gastroesophageal reflux disease)     Seasonal allergic rhinitis     Advanced directives, counseling/discussion     Skin exam, screening for cancer     Multiple benign nevi     Seborrheic keratoses     Skin cancer screening     Morbid obesity (H)     Past Medical History:   Diagnosis Date     Acid reflux      Allergies      HTN (hypertension) 1990     Squamous cell carcinoma      Varicose veins         CC Imelda Rincon PA-C  [address] [facility] [address]  [place] [address] on close of this encounter.

## 2022-10-16 ENCOUNTER — HEALTH MAINTENANCE LETTER (OUTPATIENT)
Age: 72
End: 2022-10-16

## 2022-10-23 DIAGNOSIS — L30.4 INTERTRIGO: Primary | ICD-10-CM

## 2022-10-23 RX ORDER — TRIAMCINOLONE ACETONIDE 1 MG/G
OINTMENT TOPICAL 2 TIMES DAILY
Qty: 30 G | Refills: 1 | Status: SHIPPED | OUTPATIENT
Start: 2022-10-23 | End: 2024-01-24

## 2022-11-14 DIAGNOSIS — K21.9 GERD (GASTROESOPHAGEAL REFLUX DISEASE): ICD-10-CM

## 2022-11-14 RX ORDER — OMEPRAZOLE 40 MG/1
CAPSULE, DELAYED RELEASE ORAL
Qty: 90 CAPSULE | Refills: 0 | Status: SHIPPED | OUTPATIENT
Start: 2022-11-14 | End: 2023-02-13

## 2022-11-14 NOTE — TELEPHONE ENCOUNTER
"Requested Prescriptions   Pending Prescriptions Disp Refills     omeprazole (PRILOSEC) 40 MG DR capsule [Pharmacy Med Name: OMEPRAZOLE 40MG CPDR] 90 capsule 3     Sig: TAKE ONE CAPSULE BY MOUTH ONCE DAILY       PPI Protocol Passed - 11/14/2022  7:43 AM        Passed - Not on Clopidogrel (unless Pantoprazole ordered)        Passed - No diagnosis of osteoporosis on record        Passed - Recent (12 mo) or future (30 days) visit within the authorizing provider's specialty     Patient has had an office visit with the authorizing provider or a provider within the authorizing providers department within the previous 12 mos or has a future within next 30 days. See \"Patient Info\" tab in inbasket, or \"Choose Columns\" in Meds & Orders section of the refill encounter.              Passed - Medication is active on med list        Passed - Patient is age 18 or older           Pt has appt scheduled 11/28/22.     Prescription approved per [facility] Refill Protocol.  Grazyna Gonzalez RN on 11/14/2022 at 9:42 AM    "

## 2022-11-27 ASSESSMENT — ENCOUNTER SYMPTOMS
PARESTHESIAS: 0
HEMATURIA: 0
COUGH: 0
NERVOUS/ANXIOUS: 0
HEADACHES: 0
CHILLS: 0
FREQUENCY: 0
DIZZINESS: 0
HEMATOCHEZIA: 0
ARTHRALGIAS: 0
PALPITATIONS: 0
DIARRHEA: 0
JOINT SWELLING: 0
HEARTBURN: 0
CONSTIPATION: 0
WEAKNESS: 0
NAUSEA: 0
SORE THROAT: 0
DYSURIA: 0
EYE PAIN: 0
SHORTNESS OF BREATH: 0
FEVER: 0
MYALGIAS: 0
ABDOMINAL PAIN: 0

## 2022-11-27 ASSESSMENT — ACTIVITIES OF DAILY LIVING (ADL): CURRENT_FUNCTION: NO ASSISTANCE NEEDED

## 2022-11-28 ENCOUNTER — APPOINTMENT (OUTPATIENT)
Dept: LAB | Facility: CLINIC | Age: 72
End: 2022-11-28
Payer: COMMERCIAL

## 2022-11-28 ENCOUNTER — OFFICE VISIT (OUTPATIENT)
Dept: FAMILY MEDICINE | Facility: CLINIC | Age: 72
End: 2022-11-28
Payer: COMMERCIAL

## 2022-11-28 VITALS
WEIGHT: 294 LBS | SYSTOLIC BLOOD PRESSURE: 137 MMHG | HEIGHT: 76 IN | OXYGEN SATURATION: 97 % | RESPIRATION RATE: 21 BRPM | DIASTOLIC BLOOD PRESSURE: 82 MMHG | BODY MASS INDEX: 35.8 KG/M2 | HEART RATE: 75 BPM | TEMPERATURE: 98.1 F

## 2022-11-28 DIAGNOSIS — K21.00 GASTROESOPHAGEAL REFLUX DISEASE WITH ESOPHAGITIS WITHOUT HEMORRHAGE: ICD-10-CM

## 2022-11-28 DIAGNOSIS — Z00.00 ENCOUNTER FOR MEDICARE ANNUAL WELLNESS EXAM: Primary | ICD-10-CM

## 2022-11-28 DIAGNOSIS — Z12.5 SCREENING FOR PROSTATE CANCER: ICD-10-CM

## 2022-11-28 DIAGNOSIS — R73.09 ELEVATED GLUCOSE: ICD-10-CM

## 2022-11-28 DIAGNOSIS — I10 HYPERTENSION GOAL BP (BLOOD PRESSURE) < 140/90: ICD-10-CM

## 2022-11-28 LAB
CHOLEST SERPL-MCNC: 156 MG/DL
CREAT UR-MCNC: 123 MG/DL
HBA1C MFR BLD: 5.5 % (ref 0–5.6)
HDLC SERPL-MCNC: 35 MG/DL
LDLC SERPL CALC-MCNC: 93 MG/DL
MICROALBUMIN UR-MCNC: <12 MG/L
MICROALBUMIN/CREAT UR: NORMAL MG/G{CREAT}
NONHDLC SERPL-MCNC: 121 MG/DL
TRIGL SERPL-MCNC: 139 MG/DL

## 2022-11-28 PROCEDURE — G0439 PPPS, SUBSEQ VISIT: HCPCS | Performed by: FAMILY MEDICINE

## 2022-11-28 PROCEDURE — G0103 PSA SCREENING: HCPCS | Performed by: FAMILY MEDICINE

## 2022-11-28 PROCEDURE — 80053 COMPREHEN METABOLIC PANEL: CPT | Performed by: FAMILY MEDICINE

## 2022-11-28 PROCEDURE — 99213 OFFICE O/P EST LOW 20 MIN: CPT | Mod: 25 | Performed by: FAMILY MEDICINE

## 2022-11-28 PROCEDURE — 36415 COLL VENOUS BLD VENIPUNCTURE: CPT | Performed by: FAMILY MEDICINE

## 2022-11-28 PROCEDURE — 83036 HEMOGLOBIN GLYCOSYLATED A1C: CPT | Performed by: FAMILY MEDICINE

## 2022-11-28 PROCEDURE — 80061 LIPID PANEL: CPT | Performed by: FAMILY MEDICINE

## 2022-11-28 PROCEDURE — 82043 UR ALBUMIN QUANTITATIVE: CPT | Performed by: FAMILY MEDICINE

## 2022-11-28 RX ORDER — AMLODIPINE BESYLATE 5 MG/1
10 TABLET ORAL DAILY
Qty: 180 TABLET | Refills: 3 | Status: SHIPPED | OUTPATIENT
Start: 2022-11-28 | End: 2023-11-03

## 2022-11-28 RX ORDER — METOPROLOL SUCCINATE 100 MG/1
100 TABLET, EXTENDED RELEASE ORAL DAILY
Qty: 90 TABLET | Refills: 3 | Status: SHIPPED | OUTPATIENT
Start: 2022-11-28 | End: 2024-01-02

## 2022-11-28 ASSESSMENT — ENCOUNTER SYMPTOMS
HEMATURIA: 0
DIZZINESS: 0
FREQUENCY: 0
NERVOUS/ANXIOUS: 0
CONSTIPATION: 0
NAUSEA: 0
COUGH: 0
JOINT SWELLING: 0
DYSURIA: 0
HEARTBURN: 0
HEADACHES: 0
ARTHRALGIAS: 0
SORE THROAT: 0
HEMATOCHEZIA: 0
EYE PAIN: 0
SHORTNESS OF BREATH: 0
MYALGIAS: 0
FEVER: 0
PARESTHESIAS: 0
ABDOMINAL PAIN: 0
DIARRHEA: 0
CHILLS: 0
WEAKNESS: 0
PALPITATIONS: 0

## 2022-11-28 ASSESSMENT — PAIN SCALES - GENERAL: PAINLEVEL: NO PAIN (0)

## 2022-11-28 ASSESSMENT — ACTIVITIES OF DAILY LIVING (ADL): CURRENT_FUNCTION: NO ASSISTANCE NEEDED

## 2022-11-28 NOTE — PATIENT INSTRUCTIONS
Patient Education   Personalized Prevention Plan  You are due for the preventive services outlined below.  Your care team is available to assist you in scheduling these services.  If you have already completed any of these items, please share that information with your care team to update in your medical record.  Health Maintenance Due   Topic Date Due     ANNUAL REVIEW OF HM ORDERS  Never done     AORTIC ANEURYSM SCREENING (SYSTEM ASSIGNED)  Never done     Annual Wellness Visit  11/22/2022       Understanding USDA MyPlate  The USDA has guidelines to help you make healthy food choices. These are called MyPlate. MyPlate shows the food groups that make up healthy meals using the image of a place setting. Before you eat, think about the healthiest choices for what to put on your plate or in your cup or bowl. To learn more about building a healthy plate, visit www.choosemyplate.gov.    The food groups    Fruits. Any fruit or 100% fruit juice counts as part of the Fruit Group. Fruits may be fresh, canned, frozen, or dried, and may be whole, cut-up, or pureed. Make 1/2 of your plate fruits and vegetables.    Vegetables. Any vegetable or 100% vegetable juice counts as a member of the Vegetable Group. Vegetables may be fresh, frozen, canned, or dried. They can be served raw or cooked and may be whole, cut-up, or mashed. Make 1/2 of your plate fruits and vegetables.    Grains. All foods made from grains are part of the Grains Group. These include wheat, rice, oats, cornmeal, and barley. Grains are often used to make foods such as bread, pasta, oatmeal, cereal, tortillas, and grits. Grains should be no more than 1/4 of your plate. At least half of your grains should be whole grains.    Protein. This group includes meat, poultry, seafood, beans and peas, eggs, processed soy products (such as tofu), nuts (including nut butters), and seeds. Make protein choices no more than 1/4 of your plate. Meat and poultry choices should be  lean or low fat.    Dairy. The Dairy Group includes all fluid milk products and foods made from milk that contain calcium, such as yogurt and cheese. (Foods that have little calcium, such as cream, butter, and cream cheese, are not part of this group.) Most dairy choices should be low-fat or fat-free.    Oils. Oils aren't a food group, but they do contain essential nutrients. However it's important to watch your intake of oils. These are fats that are liquid at room temperature. They include canola, corn, olive, soybean, vegetable, and sunflower oil. Foods that are mainly oil include mayonnaise, certain salad dressings, and soft margarines. You likely already get your daily oil allowance from the foods you eat.  Things to limit  Eating healthy also means limiting these things in your diet:       Salt (sodium). Many processed foods have a lot of sodium. To keep sodium intake down, eat fresh vegetables, meats, poultry, and seafood when possible. Purchase low-sodium, reduced-sodium, or no-salt-added food products at the store. And don't add salt to your meals at home. Instead, season them with herbs and spices such as dill, oregano, cumin, and paprika. Or try adding flavor with lemon or lime zest and juice.    Saturated fat. Saturated fats are most often found in animal products such as beef, pork, and chicken. They are often solid at room temperature, such as butter. To reduce your saturated fat intake, choose leaner cuts of meat and poultry. And try healthier cooking methods such as grilling, broiling, roasting, or baking. For a simple lower-fat swap, use plain nonfat yogurt instead of mayonnaise when making potato salad or macaroni salad.    Added sugars. These are sugars added to foods. They are in foods such as ice cream, candy, soda, fruit drinks, sports drinks, energy drinks, cookies, pastries, jams, and syrups. Cut down on added sugars by sharing sweet treats with a family member or friend. You can also choose  fruit for dessert, and drink water or other unsweetened beverages.     [employer] last reviewed this educational content on 6/1/2020 2000-2021 [employer]. All rights reserved. This information is not intended as a substitute for professional medical care. Always follow your healthcare professional's instructions.

## 2022-11-28 NOTE — PROGRESS NOTES
"UBJECTIVE:   Juan Jose is a 71 year old who presents for Preventive Visit.  Patient has been advised of split billing requirements and indicates understanding: Yes  Are you in the first 12 months of your Medicare coverage?  No    Healthy Habits:     In general, how would you rate your overall health?  Good    Frequency of exercise:  2-3 days/week    Duration of exercise:  Less than 15 minutes    Do you usually eat at least 4 servings of fruit and vegetables a day, include whole grains    & fiber and avoid regularly eating high fat or \"junk\" foods?  No    Taking medications regularly:  Yes    Medication side effects:  None    Ability to successfully perform activities of daily living:  No assistance needed    Home Safety:  Throw rugs in the hallway and lack of grab bars in the bathroom    Hearing Impairment:  No hearing concerns    In the past 6 months, have you been bothered by leaking of urine?  No    In general, how would you rate your overall mental or emotional health?  Good      PHQ-2 Total Score: 0    Additional concerns today:  No      Have you ever done Advance Care Planning? (For example, a Health Directive, POLST, or a discussion with a medical provider or your loved ones about your wishes): Yes, advance care planning is on file.       Fall risk  Fallen 2 or more times in the past year?: No  Any fall with injury in the past year?: No    Cognitive Screening   1) Repeat 3 items (Leader, Season, Table)    2) Clock draw: NORMAL  3) 3 item recall: Recalls 3 objects  Results: 3 items Recalled-Normal Clock    Mini-CogTM Copyright MELECIO Lutz. Licensed by the author for use in [facility]; reprinted with permission ([email or web address]). All rights reserved.      Do you have sleep apnea, excessive snoring or daytime drowsiness?: yes - Sleep Apnea    Reviewed and updated as needed this visit by clinical staff   Tobacco  Allergies  Meds              Reviewed and updated as needed this visit by Provider              "    Social History     Tobacco Use     Smoking status: Never     Smokeless tobacco: Never   Substance Use Topics     Alcohol use: Yes     Comment: 1-2 beers a week      If you drink alcohol do you typically have >3 drinks per day or >7 drinks per week? No    No flowsheet data found.        Hypertension Follow-up      Do you check your blood pressure regularly outside of the clinic? Yes     Are you following a low salt diet? Yes    Are your blood pressures ever more than 140 on the top number (systolic) OR more   than 90 on the bottom number (diastolic), for example 140/90? No      Current providers sharing in care for this patient include:   Patient Care Team:  Alphonso Grover MD as PCP - General (Family Practice)  Imelda Rincon PA-C as Physician Assistant (Physician Assistant)  Servando Diane MD as MD (Gastroenterology)  Ernesto Blankenship MD as Assigned Sleep Provider  Alphonso Grover MD as Assigned PCP  Thea Sosa CNP as Nurse Practitioner (Urology)  Sharon Lozoya RN as Registered Nurse  Imelda Rincon PA-C as Assigned Surgical Provider    The following health maintenance items are reviewed in Epic and correct as of today:  Health Maintenance   Topic Date Due     ANNUAL REVIEW OF HM ORDERS  Never done     AORTIC ANEURYSM SCREENING (SYSTEM ASSIGNED)  Never done     MEDICARE ANNUAL WELLNESS VISIT  11/22/2022     FALL RISK ASSESSMENT  11/28/2023     LIPID  11/22/2026     DTAP/TDAP/TD IMMUNIZATION (4 - Td or Tdap) 06/26/2027     ADVANCE CARE PLANNING  11/28/2027     COLORECTAL CANCER SCREENING  12/06/2031     HEPATITIS C SCREENING  Completed     PHQ-2 (once per calendar year)  Completed     INFLUENZA VACCINE  Completed     Pneumococcal Vaccine: 65+ Years  Completed     ZOSTER IMMUNIZATION  Completed     COVID-19 Vaccine  Completed     IPV IMMUNIZATION  Aged Out     MENINGITIS IMMUNIZATION  Aged Out     Lab work is in process  Pneumonia  "Vaccine:For adults with an immunocompromising condition, cerebrospinal fluid leak, or cochlear implant, administer 1 dose of PCV13 first then give 1 dose of PPSV23 at least 1 year later. Anyone who received any doses of PPSV23 before age 65 should receive 1 final dose of the vaccine at age 65 or older. Administer this last dose at least 5 years after the prior PPSV23 dose.        Review of Systems   Constitutional: Negative for chills and fever.   HENT: Negative for congestion, ear pain, hearing loss and sore throat.    Eyes: Negative for pain and visual disturbance.   Respiratory: Negative for cough and shortness of breath.    Cardiovascular: Negative for chest pain, palpitations and peripheral edema.   Gastrointestinal: Negative for abdominal pain, constipation, diarrhea, heartburn, hematochezia and nausea.   Genitourinary: Positive for impotence. Negative for dysuria, frequency, genital sores, hematuria, penile discharge and urgency.   Musculoskeletal: Negative for arthralgias, joint swelling and myalgias.   Skin: Negative for rash.   Neurological: Negative for dizziness, weakness, headaches and paresthesias.   Psychiatric/Behavioral: Negative for mood changes. The patient is not nervous/anxious.      Constitutional, HEENT, cardiovascular, pulmonary, gi and gu systems are negative, except as otherwise noted.    OBJECTIVE:   /82   Pulse 75   Temp 98.1  F (36.7  C) (Temporal)   Resp 21   Ht 1.94 m (6' 4.38\")   Wt 133.4 kg (294 lb)   SpO2 97%   BMI 35.43 kg/m   Estimated body mass index is 35.43 kg/m  as calculated from the following:    Height as of this encounter: 1.94 m (6' 4.38\").    Weight as of this encounter: 133.4 kg (294 lb).  Physical Exam  GENERAL: healthy, alert and no distress  EYES: Eyes grossly normal to inspection, PERRL and conjunctivae and sclerae normal  HENT: ear canals and TM's normal, nose and mouth without ulcers or lesions  NECK: no adenopathy, no asymmetry, masses, or scars and " thyroid normal to palpation  RESP: lungs clear to auscultation - no rales, rhonchi or wheezes  CV: regular rate and rhythm, normal S1 S2, no S3 or S4, no murmur, click or rub, no peripheral edema and peripheral pulses strong  ABDOMEN: soft, nontender, no hepatosplenomegaly, no masses and bowel sounds normal   (male): normal male genitalia without lesions or urethral discharge, no hernia  RECTAL (male): normal sphincter tone, no rectal masses, prostate normal size, smooth, nontender without nodules or masses  MS: no gross musculoskeletal defects noted, no edema  SKIN: no suspicious lesions or rashes  PSYCH: mentation appears normal, affect normal/bright  LYMPH: no cervical, supraclavicular, axillary, or inguinal adenopathy  Diabetic foot exam: normal DP and PT pulses, no trophic changes or ulcerative lesions and normal sensory exam    Diagnostic Test Results:  Labs reviewed in Epic  Results for orders placed or performed in visit on 11/28/22   Hemoglobin A1c     Status: Normal   Result Value Ref Range    Hemoglobin A1C 5.5 0.0 - 5.6 %       ASSESSMENT / PLAN:   (Z00.00) Encounter for Medicare annual wellness exam  (primary encounter diagnosis)  Comment: overall well    Plan: stay active, work on weight    (I10) Hypertension goal BP (blood pressure) < 140/90  Comment: Well controlled   Plan: Comprehensive metabolic panel (BMP + Alb, Alk         Phos, ALT, AST, Total. Bili, TP), Lipid panel         reflex to direct LDL Fasting, amLODIPine         (NORVASC) 5 MG tablet, metoprolol succinate ER         (TOPROL XL) 100 MG 24 hr tablet, Albumin Random        Urine Quantitative with Creat Ratio            (R73.09) Elevated glucose  Comment: recheck  Plan: Hemoglobin A1c        Work on weight loss/ diet    (Z12.5) Screening for prostate cancer  Comment: wants to do   Plan: PSA, screen            (K21.00) Gastroesophageal reflux disease with esophagitis without hemorrhage  Comment: Well controlled on medication   Plan: ok  "refill          COUNSELING:  Reviewed preventive health counseling, as reflected in patient instructions       Regular exercise       Healthy diet/nutrition       Vision screening       Hearing screening       Dental care       Bladder control       Fall risk prevention       Colon cancer screening       Prostate cancer screening      BMI:   Estimated body mass index is 35.43 kg/m  as calculated from the following:    Height as of this encounter: 1.94 m (6' 4.38\").    Weight as of this encounter: 133.4 kg (294 lb).   Weight management plan: Discussed healthy diet and exercise guidelines      He reports that he has never smoked. He has never used smokeless tobacco.      Appropriate preventive services were discussed with this patient, including applicable screening as appropriate for cardiovascular disease, diabetes, osteopenia/osteoporosis, and glaucoma.  As appropriate for age/gender, discussed screening for colorectal cancer, prostate cancer, breast cancer, and cervical cancer. Checklist reviewing preventive services available has been given to the patient.    Reviewed patients plan of care and provided an AVS. The Intermediate Care Plan ( asthma action plan, low back pain action plan, and migraine action plan) for Juan Jose meets the Care Plan requirement. This Care Plan has been established and reviewed with the Patient.      Alphonso Grover MD  [facility]    Identified Health Risks:  "

## 2022-11-28 NOTE — PROGRESS NOTES
"    The patient was counseled and encouraged to consider modifying their diet and eating habits. He was provided with information on recommended healthy diet options.  Answers for HPI/ROS submitted by the patient on 11/27/2022  In general, how would you rate your overall physical health?: good  Frequency of exercise:: 2-3 days/week  Do you usually eat at least 4 servings of fruit and vegetables a day, include whole grains & fiber, and avoid regularly eating high fat or \"junk\" foods? : No  Taking medications regularly:: Yes  Medication side effects:: None  Activities of Daily Living: no assistance needed  Home safety: throw rugs in the hallway, lack of grab bars in the bathroom  Hearing Impairment:: no hearing concerns  In the past 6 months, have you been bothered by leaking of urine?: No  abdominal pain: No  Blood in stool: No  Blood in urine: No  chest pain: No  chills: No  congestion: No  constipation: No  cough: No  diarrhea: No  dizziness: No  ear pain: No  eye pain: No  nervous/anxious: No  fever: No  frequency: No  genital sores: No  headaches: No  hearing loss: No  heartburn: No  arthralgias: No  joint swelling: No  peripheral edema: No  mood changes: No  myalgias: No  nausea: No  dysuria: No  palpitations: No  Skin sensation changes: No  sore throat: No  urgency: No  rash: No  shortness of breath: No  visual disturbance: No  weakness: No  impotence: Yes  penile discharge: No  In general, how would you rate your overall mental or emotional health?: good  Additional concerns today:: No  Duration of exercise:: Less than 15 minutes        "

## 2022-11-29 LAB
ALBUMIN SERPL BCG-MCNC: 4.4 G/DL (ref 3.5–5.2)
ALP SERPL-CCNC: 67 U/L (ref 40–129)
ALT SERPL W P-5'-P-CCNC: 46 U/L (ref 10–50)
ANION GAP SERPL CALCULATED.3IONS-SCNC: 10 MMOL/L (ref 7–15)
AST SERPL W P-5'-P-CCNC: 50 U/L (ref 10–50)
BILIRUB SERPL-MCNC: 1.1 MG/DL
BUN SERPL-MCNC: 16.2 MG/DL (ref 8–23)
CALCIUM SERPL-MCNC: 9.3 MG/DL (ref 8.8–10.2)
CHLORIDE SERPL-SCNC: 103 MMOL/L (ref 98–107)
CREAT SERPL-MCNC: 1.08 MG/DL (ref 0.67–1.17)
DEPRECATED HCO3 PLAS-SCNC: 24 MMOL/L (ref 22–29)
GFR SERPL CREATININE-BSD FRML MDRD: 74 ML/MIN/1.73M2
GLUCOSE SERPL-MCNC: 114 MG/DL (ref 70–99)
POTASSIUM SERPL-SCNC: 4.2 MMOL/L (ref 3.4–5.3)
PROT SERPL-MCNC: 7.6 G/DL (ref 6.4–8.3)
PSA SERPL-MCNC: 1.09 NG/ML (ref 0–4.5)
SODIUM SERPL-SCNC: 137 MMOL/L (ref 136–145)

## 2022-12-05 ENCOUNTER — DOCUMENTATION ONLY (OUTPATIENT)
Dept: OTHER | Facility: CLINIC | Age: 72
End: 2022-12-05

## 2022-12-09 ASSESSMENT — SLEEP AND FATIGUE QUESTIONNAIRES
HOW LIKELY ARE YOU TO NOD OFF OR FALL ASLEEP WHILE SITTING INACTIVE IN A PUBLIC PLACE: WOULD NEVER DOZE
HOW LIKELY ARE YOU TO NOD OFF OR FALL ASLEEP IN A CAR, WHILE STOPPED FOR A FEW MINUTES IN TRAFFIC: WOULD NEVER DOZE
HOW LIKELY ARE YOU TO NOD OFF OR FALL ASLEEP WHILE LYING DOWN TO REST IN THE AFTERNOON WHEN CIRCUMSTANCES PERMIT: MODERATE CHANCE OF DOZING
HOW LIKELY ARE YOU TO NOD OFF OR FALL ASLEEP WHILE SITTING AND READING: SLIGHT CHANCE OF DOZING
HOW LIKELY ARE YOU TO NOD OFF OR FALL ASLEEP WHILE SITTING QUIETLY AFTER LUNCH WITHOUT ALCOHOL: SLIGHT CHANCE OF DOZING
HOW LIKELY ARE YOU TO NOD OFF OR FALL ASLEEP WHEN YOU ARE A PASSENGER IN A CAR FOR AN HOUR WITHOUT A BREAK: WOULD NEVER DOZE
HOW LIKELY ARE YOU TO NOD OFF OR FALL ASLEEP WHILE SITTING AND TALKING TO SOMEONE: WOULD NEVER DOZE
HOW LIKELY ARE YOU TO NOD OFF OR FALL ASLEEP WHILE WATCHING TV: SLIGHT CHANCE OF DOZING

## 2022-12-13 ENCOUNTER — VIRTUAL VISIT (OUTPATIENT)
Dept: SLEEP MEDICINE | Facility: CLINIC | Age: 72
End: 2022-12-13
Payer: COMMERCIAL

## 2022-12-13 VITALS — BODY MASS INDEX: 34.82 KG/M2 | HEIGHT: 75 IN | WEIGHT: 280 LBS

## 2022-12-13 DIAGNOSIS — E66.9 OBESITY WITH SLEEP APNEA: ICD-10-CM

## 2022-12-13 DIAGNOSIS — G47.30 OBESITY WITH SLEEP APNEA: ICD-10-CM

## 2022-12-13 DIAGNOSIS — G47.33 OSA ON CPAP: Primary | ICD-10-CM

## 2022-12-13 PROCEDURE — 99214 OFFICE O/P EST MOD 30 MIN: CPT | Mod: 95 | Performed by: INTERNAL MEDICINE

## 2022-12-13 ASSESSMENT — PAIN SCALES - GENERAL: PAINLEVEL: NO PAIN (0)

## 2022-12-13 NOTE — PROGRESS NOTES
Juan Jose is a 71 year old who is being evaluated via a billable video visit.      How would you like to obtain your AVS? MyChart  If the video visit is dropped, the invitation should be resent by: Send to e-mail at: [email or web address]  Will anyone else be joining your video visit? Rosana Davis    Video-Visit Details    Video Start Time: 3:05 PM  Type of service:  Video Visit    Video End Time: 3:18 PM    Originating Location (pt. Location): Home        Distant Location (provider location):  Off-site    Platform used for Video Visit: [facility]  Sleep clinic follow-up visit note      Date on this visit:  December 13, 2022     Chief complaint:  Follow-up of BUCK, review CPAP compliance measures     Juan Jose Razo is a very pleasant 71 year old male with medical history significant for HTN, GERD, and severe BUCK.  His obstructive sleep apnea is currently managed with auto titrating CPAP with pressure settings       10-17 cmH2O. He presents to sleep clinic for video visit for follow-up of his BUCK and to review CPAP compliance measures.    He was set up on October 27, 2017 with Resmed AirSense 10 Auto pressures set at 5-15 cm H2O.   He reports using the CPAP regularly during sleep. He uses a full facemask. He denies any interface concerns. There are no reports of snoring, apneas or awakenings gasping for air or choking with the device use.  Pressure settings on the device feel comfortable. He reports positive benefits with CPAP use.  He denies fatigue or excessive daytime sleepiness.  He denies drowsiness while driving.  [place] sleepiness score: 5/24    Previous sleep study results:  HST performed on 10/12/17  AHI of 32.7/hr.      DOWNLOADABLE COMPLIANCE DATA:   From November 2, 2022 through December 1, 2022 reveals that he used the device for 30 out of 30 days with an averag use of 8 hours and 37 minutes on the days used. 95th percentile on leak was 27.2 L/min. Residual AHI was 1.7  "per hour. Median pressure settings: 11.6; 95th percentile : 13.9 and max pressure: 15.6 cm water    Past medical/surgical history, family history, social history, medications and allergies were reviewed.    Problem list:  Patient Active Problem List   Diagnosis     CARDIOVASCULAR SCREENING; LDL GOAL LESS THAN 130     Hypertension goal BP (blood pressure) < 140/90     GERD (gastroesophageal reflux disease)     Seasonal allergic rhinitis     Skin exam, screening for cancer     Multiple benign nevi     Seborrheic keratoses     Skin cancer screening     Morbid obesity (H)             Physical Examination:    Ht 1.905 m (6' 3\")   Wt 127 kg (280 lb)   BMI 35.00 kg/m    General: Pleasant. Cooperative. In no apparent distress.  Pulmonary: Able to speak in full sentences easily. No cough or wheeze.   Neurologic: Alert, oriented x3.  Psychiatric: Mood euthymic. Affect congruent with full range and intensity.             Assessment and Plan:    Obstructive sleep apnea: Patient reports adequate compliance with CPAP treatment and reports positive benefits with device use.  Based on compliance data, BUCK is well controlled with auto CPAP at the current pressure settings.   Patient's current CPAP is more than 5 years old and he was interested in obtaining replacement auto CPAP equipment.  DME orders were generated for replacement auto CPAP with pressure settings 10-17 cmH2O and for renewal of CPAP supplies. We discussed about the waiting list due to current supply shortage.  Recommended continue using CPAP regularly during sleep until the replacement device becomes available and instructed to get the supplies for the device replaced regularly. Patient was instructed  to bring CPAP with him if hospitalized and if anticipating procedure that requires sedation/surgery to be sure to discuss with the provider/surgeon that  he has sleep apnea and uses CPAP therapy.    Obesity: We discussed weight management with diet and " "exercise    Patient was strongly advised to avoid driving, operating any heavy machinery or other hazardous situations while drowsy or sleepy.  Patient was counseled on the importance of driving while alert, to pull over if drowsy, or nap before getting into the vehicle if sleepy.     He was instructed to call the sleep clinic after obtaining the replacement equipment, if he needs to meet compliance, to schedule a follow-up visit in 2 months. Otherwise he will follow-up with sleep clinic in 1 year.    The above note was dictated using voice recognition software. Although reviewed after completion, some word and grammatical error may remain . Please contact the author for any clarifications.     \"I spent a total of 30  minutes with Juan Jose Razo during today's video visit. Most of this time was spent counseling the patient and  coordinating care regarding BUCK,  CPAP treatment , reviewing CPAP compliance download, weight management ,  and chart review., including documentation and further activities as noted above.\"       Ernesto Blankenship MD  [facility]   [address], [address]   [address] [place], [place] [address]e. [place] [address]   Appointments: [phone]  "

## 2022-12-29 ENCOUNTER — E-VISIT (OUTPATIENT)
Dept: FAMILY MEDICINE | Facility: CLINIC | Age: 72
End: 2022-12-29
Payer: COMMERCIAL

## 2022-12-29 DIAGNOSIS — J20.9 ACUTE BRONCHITIS WITH SYMPTOMS > 10 DAYS: Primary | ICD-10-CM

## 2022-12-29 PROCEDURE — 99421 OL DIG E/M SVC 5-10 MIN: CPT | Performed by: FAMILY MEDICINE

## 2022-12-30 RX ORDER — DOXYCYCLINE HYCLATE 100 MG
100 TABLET ORAL 2 TIMES DAILY
Qty: 14 TABLET | Refills: 0 | Status: SHIPPED | OUTPATIENT
Start: 2022-12-30 | End: 2023-01-06

## 2022-12-30 NOTE — PATIENT INSTRUCTIONS
"    Dear Juan Jose Razo    After reviewing your responses, I've been able to diagnose you with \"Bronchitis\" which is a common infection of your lungs. While this is most commonly caused by a virus, the symptoms you have given suggest you should be treated with antibiotics.     I have sent doxycycline to your pharmacy to treat this infection.     It is important that you take all of your prescribed medication even if your symptoms are improving after a few doses. Taking all of your medicine helps prevent the symptoms from returning.     If your symptoms worsen, you develop chest pain or shortness of breath, fevers over 101, or are not improving in 5 days, please contact your primary care provider for an appointment or visit any of our convenient Walk-in Care or Urgent Care Centers to be seen which can be found on our website here.    Thanks again for choosing us as your health care partner,    Idris Rowe MD    "

## 2023-01-09 ENCOUNTER — MYC MEDICAL ADVICE (OUTPATIENT)
Dept: FAMILY MEDICINE | Facility: CLINIC | Age: 73
End: 2023-01-09

## 2023-01-09 NOTE — TELEPHONE ENCOUNTER
Please see mychart msg and advise - blood tinged sputum post URI and flu and abx for 10 days     Thank you,  Marley MAYA RN  [facility]

## 2023-01-17 NOTE — NURSING NOTE
1 year appointment reminder message was created and will be sent out 2 - 3 months prior to next appointment due date. Briseida Gastelum, [facility]

## 2023-02-13 DIAGNOSIS — K21.9 GERD (GASTROESOPHAGEAL REFLUX DISEASE): ICD-10-CM

## 2023-02-13 RX ORDER — OMEPRAZOLE 40 MG/1
CAPSULE, DELAYED RELEASE ORAL
Qty: 90 CAPSULE | Refills: 1 | Status: SHIPPED | OUTPATIENT
Start: 2023-02-13 | End: 2023-08-08

## 2023-02-13 NOTE — TELEPHONE ENCOUNTER
Prescription approved per [facility] Refill Protocol.    Kyra Ruiz RN   [facility]

## 2023-02-16 NOTE — PROGRESS NOTES
[facility] Dermatology Note  Encounter Date: Feb 20, 2023  Office Visit     Dermatology Problem List:  1. 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 History of dysplastic nevus (per patient)  3. Onychomycosis  4. AKs  5. 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 Intertrigo, groin  - Current tx: hydrocortisone 2.5% ointment, triamcinolone 0.025% ointment, nystatin cream  7. 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/2010  - SK, R frontal scalp s/p shave biopsy 6/3/2014  - Intradermal melanocytic nevus (one tissue fragment) and multiple   fibroepithelial polyps , bilateral axillae s/p shave biopsy 12/29/2016  - Pilar cyst, L occipital scalp s/p shave biopsy 2/9/2017  Verrucous vulgaris, right lateral neck s/p biopsy 3/12/2018  - SK, Left parietal scalp, 9/12/2018  - Acrochordon, L upper posterior thigh, s/p bx 2/14/2022  - Macular SK and fibrosis, L top shoulder s/p bx 2/14/2022    Lesion to monitor: digital myxoid cyst, L index finger    Last FBSE: 2/20/2023  ____________________________________________    Assessment & Plan:    # Intertrigo, groin  - Continue nystatin cream daily PRN for flares to the groin.  - Continue triamcinolone 0.025% ointment daily PRN for flares to the groin.    # Acrochordons, L lateral thigh  # Cherry angiomas  # Seborrheic keratoses  Discussed the natural history and benign nature of this lesion. Reassurance provided that no additional treatment is necessary.     # Solar lentigines  # Multiple benign nevi  - No concerning lesions today  - Monitor for ABCDEs of melanoma   - Continue sun protection - recommend SPF 30 or higher with frequent application   - Return sooner if noticing changing or symptomatic lesions     # History of NMSC. No evidence of recurrent disease.  # History of dysplastic nevus.  - Continue  photoprotection - recommend SPF 30 or higher with frequent reapplication  - Continue yearly skin exams  - Advised to monitor for changing, non-healing, bleeding, painful, changing, or otherwise symptomatic lesions  - Future considerations: niacinamide 500 mg BID    Procedures Performed:   None    Follow-up: 6 month(s) in-person, or earlier for new or changing lesions    Staff and Scribe:     Scribe Disclosure:  I, Ger Nieto, am serving as a scribe to document services personally performed by Imelda Rincon PA-C based on data collection and the provider's statements to me.     Provider Disclosure:   The documentation recorded by the scribe accurately reflects the services I personally performed and the decisions made by me.    All risks, benefits and alternatives were discussed with patient.  Patient is in agreement and understands the assessment and plan.  All questions were answered.  Sun Screen Education was given.   Return to Clinic in 6 months or sooner as needed.   Imelda Rincon PA-C   [facility]   ____________________________________________    CC: Skin Check (FBSE; 6 month follow-up/No areas of concern currently, per patient)    HPI:  Mr. Juan Jose Razo is a(n) 72 year old male who presents today as a return patient for a FBSE. Last seen in dermatology by me on 8/17/2022, at which time patient received cryotherapy for treatment of ISKs on the neck.    Today, patient does not endorse any specific spots of concern.     Patient is otherwise feeling well, without additional skin concerns.    Labs Reviewed:  N/A    Physical Exam:  Vitals: There were no vitals taken for this visit.  SKIN: Total skin excluding the undergarment areas was performed. The exam included the head/face, neck, both arms, chest, back, abdomen, both legs, digits and/or nails.   - No reported erythema to the groin.  - There are dome shaped bright red papules on the trunk and extremities.   -  Multiple regular brown pigmented macules and papules are identified on the trunk and extremities.   - There is(are) skin colored pedunculated papules on the L lateral thigh.   - Scattered brown macules on sun exposed areas.  - There are waxy stuck on tan to brown papules on the trunk and extremities.   - No other lesions of concern on areas examined.     Medications:  Current Outpatient Medications   Medication     amLODIPine (NORVASC) 5 MG tablet     FAMOTIDINE 20 MG PO tablet     metoprolol succinate ER (TOPROL XL) 100 MG 24 hr tablet     Multiple Vitamins-Minerals (CENTRUM SILVER ULTRA MENS PO)     nystatin (MYCOSTATIN) 791880 UNIT/GM external cream     omeprazole (PRILOSEC) 40 MG DR capsule     sildenafil (REVATIO) 20 MG tablet     triamcinolone (KENALOG) 0.025 % external ointment     triamcinolone (KENALOG) 0.1 % external ointment     vitamin D3 (CHOLECALCIFEROL) 1000 units (25 mcg) tablet     No current facility-administered medications for this visit.      Past Medical History:   Patient Active Problem List   Diagnosis     CARDIOVASCULAR SCREENING; LDL GOAL LESS THAN 130     Hypertension goal BP (blood pressure) < 140/90     GERD (gastroesophageal reflux disease)     Seasonal allergic rhinitis     Skin exam, screening for cancer     Multiple benign nevi     Seborrheic keratoses     Skin cancer screening     Morbid obesity (H)     Past Medical History:   Diagnosis Date     Acid reflux      Allergies      HTN (hypertension) 1990     Squamous cell carcinoma      Varicose veins         CC Referred Self, MD  No address on file on close of this encounter.

## 2023-02-20 ENCOUNTER — OFFICE VISIT (OUTPATIENT)
Dept: DERMATOLOGY | Facility: CLINIC | Age: 73
End: 2023-02-20
Payer: COMMERCIAL

## 2023-02-20 DIAGNOSIS — Z85.828 HISTORY OF NONMELANOMA SKIN CANCER: ICD-10-CM

## 2023-02-20 DIAGNOSIS — Z86.018 HISTORY OF DYSPLASTIC NEVUS: ICD-10-CM

## 2023-02-20 DIAGNOSIS — L82.1 SEBORRHEIC KERATOSIS: Primary | ICD-10-CM

## 2023-02-20 DIAGNOSIS — D18.01 CHERRY ANGIOMA: ICD-10-CM

## 2023-02-20 DIAGNOSIS — L81.4 SOLAR LENTIGO: ICD-10-CM

## 2023-02-20 DIAGNOSIS — D22.9 MULTIPLE BENIGN NEVI: ICD-10-CM

## 2023-02-20 DIAGNOSIS — L91.8 ACROCHORDON: ICD-10-CM

## 2023-02-20 DIAGNOSIS — L30.4 INTERTRIGO: ICD-10-CM

## 2023-02-20 PROCEDURE — 99213 OFFICE O/P EST LOW 20 MIN: CPT | Performed by: PHYSICIAN ASSISTANT

## 2023-02-20 RX ORDER — NYSTATIN 100000 U/G
CREAM TOPICAL
Qty: 60 G | Refills: 11 | Status: SHIPPED | OUTPATIENT
Start: 2023-02-20 | End: 2024-04-15

## 2023-02-20 RX ORDER — TRIAMCINOLONE ACETONIDE 0.25 MG/G
OINTMENT TOPICAL 2 TIMES DAILY
Qty: 80 G | Refills: 3 | Status: SHIPPED | OUTPATIENT
Start: 2023-02-20 | End: 2024-04-15

## 2023-02-20 ASSESSMENT — PAIN SCALES - GENERAL: PAINLEVEL: NO PAIN (0)

## 2023-02-20 NOTE — PATIENT INSTRUCTIONS
Patient Education     Checking for Skin Cancer  You can find cancer early by checking your skin each month. There are 3 kinds of skin cancer. They are melanoma, basal cell carcinoma, and squamous cell carcinoma. Doing monthly skin checks is the best way to find new marks or skin changes. Follow the instructions below for checking your skin.   The ABCDEs of checking moles for melanoma   Check your moles or growths for signs of melanoma using ABCDE:   Asymmetry: the sides of the mole or growth don t match  Border: the edges are ragged, notched, or blurred  Color: the color within the mole or growth varies  Diameter: the mole or growth is larger than 6 mm (size of a pencil eraser)  Evolving: the size, shape, or color of the mole or growth is changing (evolving is not shown in the images below)    Checking for other types of skin cancer  Basal cell carcinoma or squamous cell carcinoma have symptoms such as:     A spot or mole that looks different from all other marks on your skin  Changes in how an area feels, such as itching, tenderness, or pain  Changes in the skin's surface, such as oozing, bleeding, or scaliness  A sore that does not heal  New swelling or redness beyond the border of a mole    Who s at risk?  Anyone can get skin cancer. But you are at greater risk if you have:   Fair skin, light-colored hair, or light-colored eyes  Many moles or abnormal moles on your skin  A history of sunburns from sunlight or tanning beds  A family history of skin cancer  A history of exposure to radiation or chemicals  A weakened immune system  If you have had skin cancer in the past, you are at risk for recurring skin cancer.   How to check your skin  Do your monthly skin checkups in front of a full-length mirror. Check all parts of your body, including your:   Head (ears, face, neck, and scalp)  Torso (front, back, and sides)  Arms (tops, undersides, upper, and lower armpits)  Hands (palms, backs, and fingers, including  under the nails)  Buttocks and genitals  Legs (front, back, and sides)  Feet (tops, soles, toes, including under the nails, and between toes)  If you have a lot of moles, take digital photos of them each month. Make sure to take photos both up close and from a distance. These can help you see if any moles change over time.   Most skin changes are not cancer. But if you see any changes in your skin, call your doctor right away. Only he or she can diagnose a problem. If you have skin cancer, seeing your doctor can be the first step toward getting the treatment that could save your life.   [employer] last reviewed this educational content on 4/1/2019 2000-2020 [employer]. [address] [place] [address], [place] [address]. All rights reserved. This information is not intended as a substitute for professional medical care. Always follow your healthcare professional's instructions.       When should I call my doctor?  If you are worsening or not improving, please, contact us or seek urgent care as noted below.     Who should I call with questions (adults)?  [facility] (adult and pediatric): [phone]  [facility] (adult): [phone]  For urgent needs outside of business hours call the [facility] at [phone] and ask for the dermatology resident on call to be paged  If this is a medical emergency and you are unable to reach an ER, Call [phone]    Who should I call with questions (pediatric)?  [facility]- Pediatric Dermatology  Dr. Stacy Subramanian, Dr. Devan Parker, Dr. Angelica Monahan, DAVID Sim, Dr. Ruba Finch, Dr. Verona Neumann & Dr. Nithin Hartley  Non-urgent nurse triage line; [phone]- Malka and Yessenia HANSEN Care Coordinatorkvng Feliz (/Complex ) [phone]    If you need a prescription refill, please contact your pharmacy. Refills are approved or denied by our  Physicians during normal business hours, Monday through Fridays  Per office policy, refills will not be granted if you have not been seen within the past year (or sooner depending on your child's condition)    Scheduling Information:  Pediatric Appointment Scheduling and Call Center [phone]  Radiology Scheduling- [phone]  Sedation Unit Scheduling- [phone]  [place] Scheduling- General [phone]; Pediatric Dermatology [phone]  Main  Services: [phone]  Danish: [phone]  Hong Konger: [phone]  Hmong/Danish/Lithuanian: [phone]  Preadmission Nursing Department Fax Number: [phone] (Fax all pre-operative paperwork to this number)    For urgent matters arising during evenings, weekends, or holidays that cannot wait for normal business hours please call [phone] and ask for the dermatology resident on call to be paged.

## 2023-02-20 NOTE — LETTER
2/20/2023       RE: Juan Jose Razo  [address] [address]     Dear Colleague,    Thank you for referring your patient, Juan Jose Razo, to the [facility] DERMATOLOGY CLINIC [place] at [facility]. Please see a copy of my visit note below.    [facility] Dermatology Note  Encounter Date: Feb 20, 2023  Office Visit     Dermatology Problem List:  1. 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 History of dysplastic nevus (per patient)  3. Onychomycosis  4. AKs  5. 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 Intertrigo, groin  - Current tx: hydrocortisone 2.5% ointment, triamcinolone 0.025% ointment, nystatin cream  7. 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/2010  - SK, R frontal scalp s/p shave biopsy 6/3/2014  - Intradermal melanocytic nevus (one tissue fragment) and multiple   fibroepithelial polyps , bilateral axillae s/p shave biopsy 12/29/2016  - Pilar cyst, L occipital scalp s/p shave biopsy 2/9/2017  Verrucous vulgaris, right lateral neck s/p biopsy 3/12/2018  - SK, Left parietal scalp, 9/12/2018  - Acrochordon, L upper posterior thigh, s/p bx 2/14/2022  - Macular SK and fibrosis, L top shoulder s/p bx 2/14/2022    Lesion to monitor: digital myxoid cyst, L index finger    Last FBSE: 2/20/2023  ____________________________________________    Assessment & Plan:    # Intertrigo, groin  - Continue nystatin cream daily PRN for flares to the groin.  - Continue triamcinolone 0.025% ointment daily PRN for flares to the groin.    # Acrochordons, L lateral thigh  # Cherry angiomas  # Seborrheic keratoses  Discussed the natural history and benign nature of this lesion. Reassurance provided that no additional treatment is necessary.     # Solar lentigines  # Multiple  benign nevi  - No concerning lesions today  - Monitor for ABCDEs of melanoma   - Continue sun protection - recommend SPF 30 or higher with frequent application   - Return sooner if noticing changing or symptomatic lesions     # History of NMSC. No evidence of recurrent disease.  # History of dysplastic nevus.  - Continue photoprotection - recommend SPF 30 or higher with frequent reapplication  - Continue yearly skin exams  - Advised to monitor for changing, non-healing, bleeding, painful, changing, or otherwise symptomatic lesions  - Future considerations: niacinamide 500 mg BID    Procedures Performed:   None    Follow-up: 6 month(s) in-person, or earlier for new or changing lesions    Staff and Scribe:     Scribe Disclosure:  I, Ger Nieto, am serving as a scribe to document services personally performed by Imelda Rincon PA-C based on data collection and the provider's statements to me.     Provider Disclosure:   The documentation recorded by the scribe accurately reflects the services I personally performed and the decisions made by me.    All risks, benefits and alternatives were discussed with patient.  Patient is in agreement and understands the assessment and plan.  All questions were answered.  Sun Screen Education was given.   Return to Clinic in 6 months or sooner as needed.   Imelda Rincon PA-C   [facility]   ____________________________________________    CC: Skin Check (FBSE; 6 month follow-up/No areas of concern currently, per patient)    HPI:  Mr. Juan Jose Razo is a(n) 72 year old male who presents today as a return patient for a FBSE. Last seen in dermatology by me on 8/17/2022, at which time patient received cryotherapy for treatment of ISKs on the neck.    Today, patient does not endorse any specific spots of concern.     Patient is otherwise feeling well, without additional skin concerns.    Labs Reviewed:  N/A    Physical Exam:  Vitals: There were  no vitals taken for this visit.  SKIN: Total skin excluding the undergarment areas was performed. The exam included the head/face, neck, both arms, chest, back, abdomen, both legs, digits and/or nails.   - No reported erythema to the groin.  - There are dome shaped bright red papules on the trunk and extremities.   - Multiple regular brown pigmented macules and papules are identified on the trunk and extremities.   - There is(are) skin colored pedunculated papules on the L lateral thigh.   - Scattered brown macules on sun exposed areas.  - There are waxy stuck on tan to brown papules on the trunk and extremities.   - No other lesions of concern on areas examined.     Medications:  Current Outpatient Medications   Medication     amLODIPine (NORVASC) 5 MG tablet     FAMOTIDINE 20 MG PO tablet     metoprolol succinate ER (TOPROL XL) 100 MG 24 hr tablet     Multiple Vitamins-Minerals (CENTRUM SILVER ULTRA MENS PO)     nystatin (MYCOSTATIN) 221035 UNIT/GM external cream     omeprazole (PRILOSEC) 40 MG DR capsule     sildenafil (REVATIO) 20 MG tablet     triamcinolone (KENALOG) 0.025 % external ointment     triamcinolone (KENALOG) 0.1 % external ointment     vitamin D3 (CHOLECALCIFEROL) 1000 units (25 mcg) tablet     No current facility-administered medications for this visit.      Past Medical History:   Patient Active Problem List   Diagnosis     CARDIOVASCULAR SCREENING; LDL GOAL LESS THAN 130     Hypertension goal BP (blood pressure) < 140/90     GERD (gastroesophageal reflux disease)     Seasonal allergic rhinitis     Skin exam, screening for cancer     Multiple benign nevi     Seborrheic keratoses     Skin cancer screening     Morbid obesity (H)     Past Medical History:   Diagnosis Date     Acid reflux      Allergies      HTN (hypertension) 1990     Squamous cell carcinoma      Varicose veins         CC Referred Self, MD  No address on file on close of this encounter.

## 2023-02-20 NOTE — NURSING NOTE
Dermatology Rooming Note    Juan Jose Razo's goals for this visit include:   Chief Complaint   Patient presents with     Skin Check     FBSE; 6 month follow-up  No areas of concern currently, per patient     Rosy Cox LPN

## 2023-02-25 ENCOUNTER — PATIENT OUTREACH (OUTPATIENT)
Dept: DERMATOLOGY | Facility: CLINIC | Age: 73
End: 2023-02-25
Payer: COMMERCIAL

## 2023-02-25 NOTE — TELEPHONE ENCOUNTER
Attempted to reach patient to schedule follow up in the Dermatology Clinic.  No answer,  LM on VM to call office and [employer] message sent..    Schedule with Imelda Rincon PA-C 8/2023.

## 2023-04-20 ENCOUNTER — E-VISIT (OUTPATIENT)
Dept: FAMILY MEDICINE | Facility: CLINIC | Age: 73
End: 2023-04-20
Payer: COMMERCIAL

## 2023-04-20 DIAGNOSIS — I83.10 VARICOSE VEINS OF LOWER EXTREMITY WITH INFLAMMATION, UNSPECIFIED LATERALITY: Primary | ICD-10-CM

## 2023-04-20 PROCEDURE — 99421 OL DIG E/M SVC 5-10 MIN: CPT | Performed by: FAMILY MEDICINE

## 2023-04-20 NOTE — PATIENT INSTRUCTIONS
Thank you for choosing us for your care. I think an in-clinic visit would be best next steps based on your symptoms. Please schedule a clinic appointment; you won t be charged for this eVisit.      You can schedule an appointment right here in [facility], or call [phone]

## 2023-04-20 NOTE — TELEPHONE ENCOUNTER
Encounter Diagnosis   Name Primary?     Varicose veins of lower extremity with inflammation, unspecified laterality Yes    Provider E-Visit time total (minutes): 8

## 2023-04-25 ENCOUNTER — OFFICE VISIT (OUTPATIENT)
Dept: FAMILY MEDICINE | Facility: CLINIC | Age: 73
End: 2023-04-25
Payer: COMMERCIAL

## 2023-04-25 ENCOUNTER — MYC MEDICAL ADVICE (OUTPATIENT)
Dept: FAMILY MEDICINE | Facility: CLINIC | Age: 73
End: 2023-04-25

## 2023-04-25 VITALS
RESPIRATION RATE: 12 BRPM | HEIGHT: 75 IN | BODY MASS INDEX: 36.18 KG/M2 | DIASTOLIC BLOOD PRESSURE: 81 MMHG | HEART RATE: 77 BPM | OXYGEN SATURATION: 96 % | SYSTOLIC BLOOD PRESSURE: 134 MMHG | WEIGHT: 291 LBS | TEMPERATURE: 97.9 F

## 2023-04-25 DIAGNOSIS — I80.02 PHLEBITIS AND THROMBOPHLEBITIS OF SUPERFICIAL VESSELS OF LEFT LOWER EXTREMITY: Primary | ICD-10-CM

## 2023-04-25 DIAGNOSIS — I83.812 VARICOSE VEINS OF LEFT LOWER EXTREMITY WITH PAIN: Primary | ICD-10-CM

## 2023-04-25 DIAGNOSIS — I80.02 PHLEBITIS AND THROMBOPHLEBITIS OF SUPERFICIAL VESSELS OF LEFT LOWER EXTREMITY: ICD-10-CM

## 2023-04-25 LAB
ERYTHROCYTE [DISTWIDTH] IN BLOOD BY AUTOMATED COUNT: 13.3 % (ref 10–15)
HCT VFR BLD AUTO: 43.8 % (ref 40–53)
HGB BLD-MCNC: 15 G/DL (ref 13.3–17.7)
MCH RBC QN AUTO: 31.3 PG (ref 26.5–33)
MCHC RBC AUTO-ENTMCNC: 34.2 G/DL (ref 31.5–36.5)
MCV RBC AUTO: 91 FL (ref 78–100)
PLATELET # BLD AUTO: 210 10E3/UL (ref 150–450)
RBC # BLD AUTO: 4.8 10E6/UL (ref 4.4–5.9)
WBC # BLD AUTO: 8 10E3/UL (ref 4–11)

## 2023-04-25 PROCEDURE — 99214 OFFICE O/P EST MOD 30 MIN: CPT | Performed by: FAMILY MEDICINE

## 2023-04-25 PROCEDURE — 36415 COLL VENOUS BLD VENIPUNCTURE: CPT | Performed by: FAMILY MEDICINE

## 2023-04-25 PROCEDURE — 85027 COMPLETE CBC AUTOMATED: CPT | Performed by: FAMILY MEDICINE

## 2023-04-25 NOTE — TELEPHONE ENCOUNTER
Called pt and scheduled for 3:00, 2:40 arrival time, for today 4/25.    Thanks!  Rashi Miller RN   [facility]

## 2023-04-25 NOTE — PROGRESS NOTES
"  Assessment & Plan     Varicose veins of left lower extremity with pain  Worsening, need to assess for deep vein involves,ment  - CBC with platelets  - US Lower Extremity Venous Duplex Left  - CBC with platelets    Phlebitis and thrombophlebitis of superficial vessels of left lower extremity  Heat/ compression stockings/ nasids prn               See Patient Instructions    Alphonso Grover MD  [facility] BRADLY Ingram is a 72 year old, presenting for the following health issues:    Follow Up (Varicose veins)        4/25/2023     3:12 PM   Additional Questions   Roomed by Nadya     History of Present Illness       Reason for visit:  Varicose vein inflammation    He eats 0-1 servings of fruits and vegetables daily.He consumes 1 sweetened beverage(s) daily.He exercises with enough effort to increase his heart rate 10 to 19 minutes per day.  He exercises with enough effort to increase his heart rate 3 or less days per week.   He is taking medications regularly.       Hypertension Follow-up      Do you check your blood pressure regularly outside of the clinic? Yes     Are you following a low salt diet? Yes    Are your blood pressures ever more than 140 on the top number (systolic) OR more   than 90 on the bottom number (diastolic), for example 140/90? No          Review of Systems   Constitutional, HEENT, cardiovascular, pulmonary, gi and gu systems are negative, except as otherwise noted.      Objective    /81 (BP Location: Left arm, Patient Position: Sitting, Cuff Size: Adult Large)   Pulse 77   Temp 97.9  F (36.6  C) (Temporal)   Resp 12   Ht 1.908 m (6' 3.12\")   Wt 132 kg (291 lb)   SpO2 96%   BMI 36.26 kg/m    Body mass index is 36.26 kg/m .  Physical Exam   GENERAL: alert and mild distress  CV: regular rates and rhythm, normal S1 S2, no S3 or S4, no murmur, click or rub, peripheral pulses strong and extensive tender varicosities left leg above and below knee " medially  ABDOMEN: soft, nontender, no hepatosplenomegaly, no masses and bowel sounds normal  SKIN: varicosities - lower legs  NEURO: Normal strength and tone, mentation intact and speech normal    Office Visit on 11/28/2022   Component Date Value Ref Range Status     Prostate Specific Antigen Screen 11/28/2022 1.09  0.00 - 4.50 ng/mL Final     Sodium 11/28/2022 137  136 - 145 mmol/L Final     Potassium 11/28/2022 4.2  3.4 - 5.3 mmol/L Final     Chloride 11/28/2022 103  98 - 107 mmol/L Final     Carbon Dioxide (CO2) 11/28/2022 24  22 - 29 mmol/L Final     Anion Gap 11/28/2022 10  7 - 15 mmol/L Final     Urea Nitrogen 11/28/2022 16.2  8.0 - 23.0 mg/dL Final     Creatinine 11/28/2022 1.08  0.67 - 1.17 mg/dL Final     Calcium 11/28/2022 9.3  8.8 - 10.2 mg/dL Final     Glucose 11/28/2022 114 (H)  70 - 99 mg/dL Final     Alkaline Phosphatase 11/28/2022 67  40 - 129 U/L Final     AST 11/28/2022 50  10 - 50 U/L Final     ALT 11/28/2022 46  10 - 50 U/L Final     Protein Total 11/28/2022 7.6  6.4 - 8.3 g/dL Final     Albumin 11/28/2022 4.4  3.5 - 5.2 g/dL Final     Bilirubin Total 11/28/2022 1.1  <=1.2 mg/dL Final     GFR Estimate 11/28/2022 74  >60 mL/min/1.73m2 Final    Effective December 21, 2021 eGFRcr in adults is calculated using the 2021 CKD-EPI creatinine equation which includes age and gender (Jessy et al., NEJM, DOI: 10.1056/SDIPfn5181699)     Cholesterol 11/28/2022 156  <200 mg/dL Final     Triglycerides 11/28/2022 139  <150 mg/dL Final     Direct Measure HDL 11/28/2022 35 (L)  >=40 mg/dL Final     LDL Cholesterol Calculated 11/28/2022 93  <=100 mg/dL Final     Non HDL Cholesterol 11/28/2022 121  <130 mg/dL Final     Hemoglobin A1C 11/28/2022 5.5  0.0 - 5.6 % Final    Normal <5.7%   Prediabetes 5.7-6.4%    Diabetes 6.5% or higher     Note: Adopted from ADA consensus guidelines.     Albumin Urine mg/L 11/28/2022 <12.0  mg/L Final    The reference ranges have not been established in urine albumin. The results should  be integrated into the clinical context for interpretation.     Albumin Urine mg/g Cr 11/28/2022    Final    Unable to calculate, urine albumin and/or urine creatinine is outside detectable limits.  Microalbuminuria is defined as an albumin:creatinine ratio of 17 to 299 for males and 25 to 299 for females. A ratio of albumin:creatinine of 300 or higher is indicative of overt proteinuria.  Due to biologic variability, positive results should be confirmed by a second, first-morning random or 24-hour timed urine specimen. If there is discrepancy, a third specimen is recommended. When 2 out of 3 results are in the microalbuminuria range, this is evidence for incipient nephropathy and warrants increased efforts at glucose control, blood pressure control, and institution of therapy with an angiotensin-converting-enzyme (ACE) inhibitor (if the patient can tolerate it).       Creatinine Urine mg/dL 11/28/2022 123.0  mg/dL Final    The reference ranges have not been established in urine creatinine. The results should be integrated into the clinical context for interpretation.

## 2023-04-26 ENCOUNTER — ANCILLARY PROCEDURE (OUTPATIENT)
Dept: ULTRASOUND IMAGING | Facility: CLINIC | Age: 73
End: 2023-04-26
Attending: FAMILY MEDICINE
Payer: COMMERCIAL

## 2023-04-26 DIAGNOSIS — I83.812 VARICOSE VEINS OF LEFT LOWER EXTREMITY WITH PAIN: ICD-10-CM

## 2023-04-26 PROCEDURE — 93971 EXTREMITY STUDY: CPT | Mod: LT | Performed by: RADIOLOGY

## 2023-04-27 NOTE — TELEPHONE ENCOUNTER
I called him, no DVT   treat  for   Encounter Diagnosis   Name Primary?     Phlebitis and thrombophlebitis of superficial vessels of left lower extremity Yes    ok hot packs. Compression stockings      For the superficial, localized, mildly tender area of thrombophlebitis that occurs in a varicose vein, treatment with mild analgesics, such as aspirin, and the use of some type of elastic support usually are sufficient. Patients are encouraged to continue their usual daily activities. If extensive varicosities are present or if symptoms persist, phlebectomy of the involved segment may be indicated.    More severe thrombophlebitis, as indicated by the degree of pain, redness, and the extent of the abnormality, should be treated with elevation of the extremity and the application of massive, hot, wet compresses. The latter measure seems to be more effective when a large, bulky dressing, including a blanket and plastic sheeting followed by hot water bottles, is used, taking care to avoid burning the patient.    Anticoagulants are usually not indicated in superficial thrombophlebitis unless the process extends into the deep venous system

## 2023-04-27 NOTE — TELEPHONE ENCOUNTER
----- Message from Ashley Almeida RN sent at 4/26/2023  2:17 PM CDT -----    ----- Message -----  From: Alphonso Grover MD  Sent: 4/26/2023   1:58 PM CDT  To: [facility] Rn Nurse Pool    Please call him, no DVT just , as we knew but lots of superficial thrombophlebitis

## 2023-08-08 DIAGNOSIS — K21.9 GERD (GASTROESOPHAGEAL REFLUX DISEASE): ICD-10-CM

## 2023-08-08 RX ORDER — OMEPRAZOLE 40 MG/1
CAPSULE, DELAYED RELEASE ORAL
Qty: 90 CAPSULE | Refills: 1 | Status: SHIPPED | OUTPATIENT
Start: 2023-08-08 | End: 2024-02-05

## 2023-08-08 NOTE — TELEPHONE ENCOUNTER
"Requested Prescriptions   Pending Prescriptions Disp Refills    omeprazole (PRILOSEC) 40 MG DR capsule [Pharmacy Med Name: OMEPRAZOLE 40MG CPDR] 90 capsule 1     Sig: TAKE ONE CAPSULE BY MOUTH ONCE DAILY       PPI Protocol Passed - 8/8/2023  6:42 AM        Passed - Not on Clopidogrel (unless Pantoprazole ordered)        Passed - No diagnosis of osteoporosis on record        Passed - Recent (12 mo) or future (30 days) visit within the authorizing provider's specialty     Patient has had an office visit with the authorizing provider or a provider within the authorizing providers department within the previous 12 mos or has a future within next 30 days. See \"Patient Info\" tab in inbasket, or \"Choose Columns\" in Meds & Orders section of the refill encounter.              Passed - Medication is active on med list        Passed - Patient is age 18 or older            Prescription approved per [facility] Refill Protocol.     Ashley Almeida RN  [facility]   "

## 2023-08-21 ENCOUNTER — OFFICE VISIT (OUTPATIENT)
Dept: DERMATOLOGY | Facility: CLINIC | Age: 73
End: 2023-08-21
Payer: COMMERCIAL

## 2023-08-21 DIAGNOSIS — D18.01 CHERRY ANGIOMA: ICD-10-CM

## 2023-08-21 DIAGNOSIS — Z85.828 HISTORY OF NONMELANOMA SKIN CANCER: Primary | ICD-10-CM

## 2023-08-21 DIAGNOSIS — Z12.83 SKIN CANCER SCREENING: ICD-10-CM

## 2023-08-21 DIAGNOSIS — L30.4 INTERTRIGO: ICD-10-CM

## 2023-08-21 DIAGNOSIS — L57.0 ACTINIC KERATOSIS: ICD-10-CM

## 2023-08-21 DIAGNOSIS — L82.1 SEBORRHEIC KERATOSIS: ICD-10-CM

## 2023-08-21 DIAGNOSIS — L81.4 SOLAR LENTIGINOSIS: ICD-10-CM

## 2023-08-21 DIAGNOSIS — Z86.018 HISTORY OF DYSPLASTIC NEVUS: ICD-10-CM

## 2023-08-21 DIAGNOSIS — D22.9 MULTIPLE BENIGN NEVI: ICD-10-CM

## 2023-08-21 PROCEDURE — 99213 OFFICE O/P EST LOW 20 MIN: CPT | Mod: 25 | Performed by: PHYSICIAN ASSISTANT

## 2023-08-21 PROCEDURE — 17000 DESTRUCT PREMALG LESION: CPT | Performed by: PHYSICIAN ASSISTANT

## 2023-08-21 ASSESSMENT — PAIN SCALES - GENERAL: PAINLEVEL: NO PAIN (0)

## 2023-08-21 NOTE — PROGRESS NOTES
[facility] Dermatology Note  Encounter Date: Aug 21, 2023  Office Visit     Dermatology Problem List:  1. 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 History of dysplastic nevus (per patient)  3. Onychomycosis  4. AKs  5. 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 Intertrigo, groin  - Current tx: hydrocortisone 2.5% ointment, triamcinolone 0.025% ointment, nystatin cream  7. 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/2010  - SK, R frontal scalp s/p shave biopsy 6/3/2014  - Intradermal melanocytic nevus (one tissue fragment) and multiple   fibroepithelial polyps , bilateral axillae s/p shave biopsy 12/29/2016  - Pilar cyst, L occipital scalp s/p shave biopsy 2/9/2017  Verrucous vulgaris, right lateral neck s/p biopsy 3/12/2018  - SK, Left parietal scalp, 9/12/2018  - Acrochordon, L upper posterior thigh, s/p bx 2/14/2022  - Macular SK and fibrosis, L top shoulder s/p bx 2/14/2022    Lesion to monitor: digital myxoid cyst, L index finger    Last FBSE: 8/21/23  ____________________________________________    Assessment & Plan:    # Actinic keratosis, left dorsal nose x 1   -Will treat in the office for cryotherapy.     # Intertrigo, groin, stable well controlled.   - Continue nystatin cream daily PRN for flares to the groin.  - Continue triamcinolone 0.025% ointment daily PRN for flares to the groin.      # Cherry angiomas  # Seborrheic keratoses  Discussed the natural history and benign nature of this lesion. Reassurance provided that no additional treatment is necessary.     # Solar lentigines  # Multiple benign nevi  - No concerning lesions today  - Monitor for ABCDEs of melanoma   - Continue sun protection - recommend SPF 30 or higher with frequent application   - Return sooner if noticing changing or symptomatic lesions     # History of NMSC.  No evidence of recurrent disease.  # History of dysplastic nevus.  - Continue photoprotection - recommend SPF 30 or higher with frequent reapplication  - Continue yearly skin exams  - Advised to monitor for changing, non-healing, bleeding, painful, changing, or otherwise symptomatic lesions  - Future considerations: niacinamide 500 mg BID    Procedures Performed:   Cryotherapy procedure note (performed by faculty): After verbal consent and discussion of risks and benefits including but no limited to dyspigmentation/scar, blister, infection, recurrence,1 was(were) treated with 1-2mm freeze border for 2 cycles with liquid nitrogen. Post cryotherapy instructions were provided.      Follow-up: 6 month(s) in-person, or earlier for new or changing lesions    Staff :    All risks, benefits and alternatives were discussed with patient.  Patient is in agreement and understands the assessment and plan.  All questions were answered.  Sun Screen Education was given.   Return to Clinic in 6 months or sooner as needed.   Imelda Rincon PA-C   [facility]   ____________________________________________    CC: Skin Check (6 month FBSE.  No new concerns. )    HPI:  Mr. Juan Jose Razo is a(n) 72 year old male who presents today as a return patient for a FBSE. Last seen in dermatology by me on 2/20/2023 when benign skin exam and he was continued on his topical medications for intertrigo.     Today, He reports a thicker rough spot on his nose without pain. He picks at it at times.     Patient is otherwise feeling well, without additional skin concerns.    Labs Reviewed:  N/A    Physical Exam:  Vitals: There were no vitals taken for this visit.  SKIN: Total skin excluding the undergarment areas was performed. The exam included the head/face, neck, both arms, chest, back, abdomen, both legs, digits and/or nails.   There is a gritty pink papule on left dorsal nose x 1  - No reported erythema to the  groin.  - There are dome shaped bright red papules on the trunk and extremities.   - Multiple regular brown pigmented macules and papules are identified on the trunk and extremities.   - Scattered brown macules on sun exposed areas.  - There are waxy stuck on tan to brown papules on the trunk and extremities.   - No other lesions of concern on areas examined.     Medications:  Current Outpatient Medications   Medication    amLODIPine (NORVASC) 5 MG tablet    FAMOTIDINE 20 MG PO tablet    metoprolol succinate ER (TOPROL XL) 100 MG 24 hr tablet    Multiple Vitamins-Minerals (CENTRUM SILVER ULTRA MENS PO)    nystatin (MYCOSTATIN) 450215 UNIT/GM external cream    omeprazole (PRILOSEC) 40 MG DR capsule    sildenafil (REVATIO) 20 MG tablet    triamcinolone (KENALOG) 0.025 % external ointment    triamcinolone (KENALOG) 0.1 % external ointment    vitamin D3 (CHOLECALCIFEROL) 1000 units (25 mcg) tablet     No current facility-administered medications for this visit.      Past Medical History:   Patient Active Problem List   Diagnosis    CARDIOVASCULAR SCREENING; LDL GOAL LESS THAN 130    Hypertension goal BP (blood pressure) < 140/90    GERD (gastroesophageal reflux disease)    Seasonal allergic rhinitis    Skin exam, screening for cancer    Multiple benign nevi    Seborrheic keratoses    Skin cancer screening    Morbid obesity (H)     Past Medical History:   Diagnosis Date    Acid reflux     Allergies     HTN (hypertension) 1990    Squamous cell carcinoma     Varicose veins         CC Referred Self, MD  No address on file on close of this encounter.

## 2023-08-21 NOTE — LETTER
8/21/2023       RE: Juan Jose Razo  [address] [address]     Dear Colleague,    Thank you for referring your patient, Juan Jose Razo, to the [facility] [place] [facility]. Please see a copy of my visit note below.    [facility] Dermatology Note  Encounter Date: Aug 21, 2023  Office Visit     Dermatology Problem List:  1. 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 History of dysplastic nevus (per patient)  3. Onychomycosis  4. AKs  5. 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 Intertrigo, groin  - Current tx: hydrocortisone 2.5% ointment, triamcinolone 0.025% ointment, nystatin cream  7. 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/2010  - SK, R frontal scalp s/p shave biopsy 6/3/2014  - Intradermal melanocytic nevus (one tissue fragment) and multiple   fibroepithelial polyps , bilateral axillae s/p shave biopsy 12/29/2016  - Pilar cyst, L occipital scalp s/p shave biopsy 2/9/2017  Verrucous vulgaris, right lateral neck s/p biopsy 3/12/2018  - SK, Left parietal scalp, 9/12/2018  - Acrochordon, L upper posterior thigh, s/p bx 2/14/2022  - Macular SK and fibrosis, L top shoulder s/p bx 2/14/2022    Lesion to monitor: digital myxoid cyst, L index finger    Last FBSE: 8/21/23  ____________________________________________    Assessment & Plan:    # Actinic keratosis, left dorsal nose x 1   -Will treat in the office for cryotherapy.     # Intertrigo, groin, stable well controlled.   - Continue nystatin cream daily PRN for flares to the groin.  - Continue triamcinolone 0.025% ointment daily PRN for flares to the groin.      # Cherry angiomas  # Seborrheic keratoses  Discussed the natural history and benign nature of this lesion. Reassurance  provided that no additional treatment is necessary.     # Solar lentigines  # Multiple benign nevi  - No concerning lesions today  - Monitor for ABCDEs of melanoma   - Continue sun protection - recommend SPF 30 or higher with frequent application   - Return sooner if noticing changing or symptomatic lesions     # History of NMSC. No evidence of recurrent disease.  # History of dysplastic nevus.  - Continue photoprotection - recommend SPF 30 or higher with frequent reapplication  - Continue yearly skin exams  - Advised to monitor for changing, non-healing, bleeding, painful, changing, or otherwise symptomatic lesions  - Future considerations: niacinamide 500 mg BID    Procedures Performed:   Cryotherapy procedure note (performed by faculty): After verbal consent and discussion of risks and benefits including but no limited to dyspigmentation/scar, blister, infection, recurrence,1 was(were) treated with 1-2mm freeze border for 2 cycles with liquid nitrogen. Post cryotherapy instructions were provided.      Follow-up: 6 month(s) in-person, or earlier for new or changing lesions    Staff :    All risks, benefits and alternatives were discussed with patient.  Patient is in agreement and understands the assessment and plan.  All questions were answered.  Sun Screen Education was given.   Return to Clinic in 6 months or sooner as needed.   Imelda Rincon PA-C   [facility]   ____________________________________________    CC: Skin Check (6 month FBSE.  No new concerns. )    HPI:  Mr. Juan Jose Razo is a(n) 72 year old male who presents today as a return patient for a FBSE. Last seen in dermatology by me on 2/20/2023 when benign skin exam and he was continued on his topical medications for intertrigo.     Today, He reports a thicker rough spot on his nose without pain. He picks at it at times.     Patient is otherwise feeling well, without additional skin concerns.    Labs  Reviewed:  N/A    Physical Exam:  Vitals: There were no vitals taken for this visit.  SKIN: Total skin excluding the undergarment areas was performed. The exam included the head/face, neck, both arms, chest, back, abdomen, both legs, digits and/or nails.   There is a gritty pink papule on left dorsal nose x 1  - No reported erythema to the groin.  - There are dome shaped bright red papules on the trunk and extremities.   - Multiple regular brown pigmented macules and papules are identified on the trunk and extremities.   - Scattered brown macules on sun exposed areas.  - There are waxy stuck on tan to brown papules on the trunk and extremities.   - No other lesions of concern on areas examined.     Medications:  Current Outpatient Medications   Medication    amLODIPine (NORVASC) 5 MG tablet    FAMOTIDINE 20 MG PO tablet    metoprolol succinate ER (TOPROL XL) 100 MG 24 hr tablet    Multiple Vitamins-Minerals (CENTRUM SILVER ULTRA MENS PO)    nystatin (MYCOSTATIN) 588463 UNIT/GM external cream    omeprazole (PRILOSEC) 40 MG DR capsule    sildenafil (REVATIO) 20 MG tablet    triamcinolone (KENALOG) 0.025 % external ointment    triamcinolone (KENALOG) 0.1 % external ointment    vitamin D3 (CHOLECALCIFEROL) 1000 units (25 mcg) tablet     No current facility-administered medications for this visit.      Past Medical History:   Patient Active Problem List   Diagnosis    CARDIOVASCULAR SCREENING; LDL GOAL LESS THAN 130    Hypertension goal BP (blood pressure) < 140/90    GERD (gastroesophageal reflux disease)    Seasonal allergic rhinitis    Skin exam, screening for cancer    Multiple benign nevi    Seborrheic keratoses    Skin cancer screening    Morbid obesity (H)     Past Medical History:   Diagnosis Date    Acid reflux     Allergies     HTN (hypertension) 1990    Squamous cell carcinoma     Varicose veins         CC Referred Self, MD  No address on file on close of this encounter.

## 2023-08-21 NOTE — NURSING NOTE
Dermatology Rooming Note    Juan Jose Razo's goals for this visit include:   Chief Complaint   Patient presents with    Skin Check     6 month FBSE.  No new concerns.      Briseyda Avery, CMA

## 2023-08-21 NOTE — PATIENT INSTRUCTIONS
Cryotherapy    What is it?  Use of a very cold liquid, such as liquid nitrogen, to freeze and destroy abnormal skin cells that need to be removed    What should I expect?  Tenderness and redness  A small blister that might grow and fill with dark purple blood. There may be crusting.  More than one treatment may be needed if the lesions do not go away.    How do I care for the treated area?  Gently wash the area with your hands when bathing.  Use a thin layer of Vaseline to help with healing. You may use a Band-Aid.   The area should heal within 7-10 days and may leave behind a pink or lighter color.   Do not use an antibiotic or Neosporin ointment.   You may take acetaminophen (Tylenol) for pain.     Call your doctor if you have:  Severe pain  Signs of infection (warmth, redness, cloudy yellow drainage, and or a bad smell)  Questions or concerns    Who should I call with questions?      [facility]: [phone]      [facility]: [phone]      For urgent needs outside of business hours call the [facility] at [phone] and ask for the dermatology resident on call

## 2023-09-07 ENCOUNTER — TELEPHONE (OUTPATIENT)
Dept: SLEEP MEDICINE | Facility: CLINIC | Age: 73
End: 2023-09-07
Payer: COMMERCIAL

## 2023-09-07 NOTE — TELEPHONE ENCOUNTER
Patient scheduled to  replacement CPAP from [facility] on 9/22/23 from 9am-12pm.

## 2023-09-22 ENCOUNTER — DOCUMENTATION ONLY (OUTPATIENT)
Dept: SLEEP MEDICINE | Facility: CLINIC | Age: 73
End: 2023-09-22
Payer: COMMERCIAL

## 2023-09-22 DIAGNOSIS — G47.33 OSA (OBSTRUCTIVE SLEEP APNEA): Primary | ICD-10-CM

## 2023-09-22 NOTE — PROGRESS NOTES
Patient was offered choice of vendor and chose [facility].  Patient Juan Jose Razo was set up at [facility] on September 22, 2023. Patient received a RESMED Airsense 11 Pressures were set at  10-17 cm H2O.   Patient s ramp is AUTO cm H2O for 5 and FLEX/EPR is 2.  Patient received a RESMED Mask name: [id number]  Full Face mask size Large, heated tubing and heated humidifier.  Patient has the following compliance requirements: using and visit requirements  Patient has a follow up on TBD.    Dianne Knowles

## 2023-10-03 ENCOUNTER — TELEPHONE (OUTPATIENT)
Dept: SLEEP MEDICINE | Facility: CLINIC | Age: 73
End: 2023-10-03
Payer: COMMERCIAL

## 2023-10-03 NOTE — TELEPHONE ENCOUNTER
Was able to get patient rescheduled to an In-Person visit (due to Medicare) on Tuesday, November 21st, 2023 at 12:00 PM with Dr. Briseno.  Attempted to call patient, but no answer. LM on  with this information and left [facility]'s call back number.  Lona Casey CMA

## 2023-10-03 NOTE — TELEPHONE ENCOUNTER
----- Message from Jocelyn Ramos sent at 9/22/2023  2:21 PM CDT -----  Regarding: CPAP Compliance Visit  Hello,     Patient received his Replacement CPAP today on 9/22/2023. Patient will need to meet compliance. His specific insurance will require a PA for continued coverage, so we need to be sure to have the compliance download and compliance visit notes by 12/21/2023 to submit a request for the continued authorization for the CPAP device. Patient has his compliance follow up scheduled for 02/07/2024, but this will be outside the compliance period. Could someone please contact patient to see if we can get him scheduled sooner or place him on a waiting list?    Thank you,   Jocelyn

## 2023-10-06 ENCOUNTER — DOCUMENTATION ONLY (OUTPATIENT)
Dept: SLEEP MEDICINE | Facility: CLINIC | Age: 73
End: 2023-10-06
Payer: COMMERCIAL

## 2023-10-06 NOTE — PROGRESS NOTES
10/6/23- JL- EXPLAINED TO MAKE SURE WATER CHAMBER IS FULLY INSERT INTO MACHINE. I ALSO EXPLAINED WHAT SNOWFLAKE MEANS AND HOW COOL DOWN FEATURE WORKS. HE WILL CALL MONDAY IF ISSUE STILL PERSISTS.

## 2023-10-09 ENCOUNTER — DOCUMENTATION ONLY (OUTPATIENT)
Dept: SLEEP MEDICINE | Facility: CLINIC | Age: 73
End: 2023-10-09
Payer: COMMERCIAL

## 2023-10-31 DIAGNOSIS — I10 HYPERTENSION GOAL BP (BLOOD PRESSURE) < 140/90: ICD-10-CM

## 2023-10-31 RX ORDER — AMLODIPINE BESYLATE 5 MG/1
10 TABLET ORAL DAILY
Qty: 180 TABLET | Refills: 3 | OUTPATIENT
Start: 2023-10-31

## 2023-11-03 DIAGNOSIS — I10 HYPERTENSION GOAL BP (BLOOD PRESSURE) < 140/90: ICD-10-CM

## 2023-11-03 RX ORDER — AMLODIPINE BESYLATE 5 MG/1
10 TABLET ORAL DAILY
Qty: 180 TABLET | Refills: 1 | Status: SHIPPED | OUTPATIENT
Start: 2023-11-03 | End: 2024-04-25

## 2023-11-03 NOTE — TELEPHONE ENCOUNTER
Patient is in need of a new order for Amlodipine 5mg tablets Prescription was issued 11/28/2022 for three months with 3 refills. Filled the Prescription   12/01/2022 03/06/2023 06/08/2023 09/11/2023    Is now saying he only has enough until Monday and then he will be out.  Please send a new order so patient does not have to go without medication.    Last office visit was on 04/25/2023    Thanks  Brittney Lynn [facility]

## 2023-11-27 ASSESSMENT — ENCOUNTER SYMPTOMS
SORE THROAT: 0
ABDOMINAL PAIN: 0
HEARTBURN: 1
EYE PAIN: 0
NAUSEA: 0
MYALGIAS: 0
DIARRHEA: 0
CHILLS: 0
DIZZINESS: 0
HEADACHES: 0
ARTHRALGIAS: 0
COUGH: 0
HEMATOCHEZIA: 0
HEMATURIA: 0
NERVOUS/ANXIOUS: 0
SHORTNESS OF BREATH: 0
PARESTHESIAS: 0
PALPITATIONS: 0
DYSURIA: 0
WEAKNESS: 0
JOINT SWELLING: 0
CONSTIPATION: 0
FEVER: 0

## 2023-11-27 ASSESSMENT — ACTIVITIES OF DAILY LIVING (ADL): CURRENT_FUNCTION: NO ASSISTANCE NEEDED

## 2023-11-30 ENCOUNTER — OFFICE VISIT (OUTPATIENT)
Dept: FAMILY MEDICINE | Facility: CLINIC | Age: 73
End: 2023-11-30
Payer: COMMERCIAL

## 2023-11-30 VITALS
HEIGHT: 75 IN | RESPIRATION RATE: 20 BRPM | TEMPERATURE: 98.1 F | BODY MASS INDEX: 36.74 KG/M2 | WEIGHT: 295.5 LBS | DIASTOLIC BLOOD PRESSURE: 78 MMHG | HEART RATE: 83 BPM | OXYGEN SATURATION: 96 % | SYSTOLIC BLOOD PRESSURE: 132 MMHG

## 2023-11-30 DIAGNOSIS — I10 HYPERTENSION GOAL BP (BLOOD PRESSURE) < 140/90: ICD-10-CM

## 2023-11-30 DIAGNOSIS — R73.09 ELEVATED GLUCOSE: ICD-10-CM

## 2023-11-30 DIAGNOSIS — Z12.5 SCREENING FOR PROSTATE CANCER: ICD-10-CM

## 2023-11-30 DIAGNOSIS — K21.9 GASTROESOPHAGEAL REFLUX DISEASE, UNSPECIFIED WHETHER ESOPHAGITIS PRESENT: ICD-10-CM

## 2023-11-30 DIAGNOSIS — Z00.00 ENCOUNTER FOR MEDICARE ANNUAL WELLNESS EXAM: Primary | ICD-10-CM

## 2023-11-30 DIAGNOSIS — R35.0 URINARY FREQUENCY: ICD-10-CM

## 2023-11-30 LAB
ALBUMIN SERPL BCG-MCNC: 4.6 G/DL (ref 3.5–5.2)
ALBUMIN UR-MCNC: NEGATIVE MG/DL
ALP SERPL-CCNC: 76 U/L (ref 40–150)
ALT SERPL W P-5'-P-CCNC: 62 U/L (ref 0–70)
ANION GAP SERPL CALCULATED.3IONS-SCNC: 13 MMOL/L (ref 7–15)
APPEARANCE UR: CLEAR
AST SERPL W P-5'-P-CCNC: 66 U/L (ref 0–45)
BACTERIA #/AREA URNS HPF: NORMAL /HPF
BILIRUB SERPL-MCNC: 0.9 MG/DL
BILIRUB UR QL STRIP: NEGATIVE
BUN SERPL-MCNC: 12.8 MG/DL (ref 8–23)
CALCIUM SERPL-MCNC: 9.3 MG/DL (ref 8.8–10.2)
CHLORIDE SERPL-SCNC: 100 MMOL/L (ref 98–107)
CHOLEST SERPL-MCNC: 156 MG/DL
COLOR UR AUTO: YELLOW
CREAT SERPL-MCNC: 1 MG/DL (ref 0.67–1.17)
CREAT UR-MCNC: 204 MG/DL
DEPRECATED HCO3 PLAS-SCNC: 25 MMOL/L (ref 22–29)
EGFRCR SERPLBLD CKD-EPI 2021: 81 ML/MIN/1.73M2
GLUCOSE SERPL-MCNC: 118 MG/DL (ref 70–99)
GLUCOSE UR STRIP-MCNC: NEGATIVE MG/DL
HBA1C MFR BLD: 5.7 % (ref 0–5.6)
HDLC SERPL-MCNC: 33 MG/DL
HGB UR QL STRIP: NEGATIVE
KETONES UR STRIP-MCNC: NEGATIVE MG/DL
LDLC SERPL CALC-MCNC: 95 MG/DL
LEUKOCYTE ESTERASE UR QL STRIP: NEGATIVE
MICROALBUMIN UR-MCNC: 13.8 MG/L
MICROALBUMIN/CREAT UR: 6.76 MG/G CR (ref 0–17)
NITRATE UR QL: NEGATIVE
NONHDLC SERPL-MCNC: 123 MG/DL
PH UR STRIP: 5.5 [PH] (ref 5–7)
POTASSIUM SERPL-SCNC: 4.2 MMOL/L (ref 3.4–5.3)
PROT SERPL-MCNC: 8.2 G/DL (ref 6.4–8.3)
PSA SERPL DL<=0.01 NG/ML-MCNC: 1.19 NG/ML (ref 0–6.5)
RBC #/AREA URNS AUTO: NORMAL /HPF
SODIUM SERPL-SCNC: 138 MMOL/L (ref 135–145)
SP GR UR STRIP: 1.02 (ref 1–1.03)
TRIGL SERPL-MCNC: 142 MG/DL
UROBILINOGEN UR STRIP-ACNC: 1 E.U./DL
WBC #/AREA URNS AUTO: NORMAL /HPF

## 2023-11-30 PROCEDURE — G0103 PSA SCREENING: HCPCS | Performed by: FAMILY MEDICINE

## 2023-11-30 PROCEDURE — 80061 LIPID PANEL: CPT | Performed by: FAMILY MEDICINE

## 2023-11-30 PROCEDURE — 36415 COLL VENOUS BLD VENIPUNCTURE: CPT | Performed by: FAMILY MEDICINE

## 2023-11-30 PROCEDURE — 82570 ASSAY OF URINE CREATININE: CPT | Performed by: FAMILY MEDICINE

## 2023-11-30 PROCEDURE — 83036 HEMOGLOBIN GLYCOSYLATED A1C: CPT | Performed by: FAMILY MEDICINE

## 2023-11-30 PROCEDURE — 81001 URINALYSIS AUTO W/SCOPE: CPT | Performed by: FAMILY MEDICINE

## 2023-11-30 PROCEDURE — 99214 OFFICE O/P EST MOD 30 MIN: CPT | Mod: 25 | Performed by: FAMILY MEDICINE

## 2023-11-30 PROCEDURE — G0439 PPPS, SUBSEQ VISIT: HCPCS | Performed by: FAMILY MEDICINE

## 2023-11-30 PROCEDURE — 82043 UR ALBUMIN QUANTITATIVE: CPT | Performed by: FAMILY MEDICINE

## 2023-11-30 PROCEDURE — 80053 COMPREHEN METABOLIC PANEL: CPT | Performed by: FAMILY MEDICINE

## 2023-11-30 RX ORDER — RESPIRATORY SYNCYTIAL VIRUS VACCINE 120MCG/0.5
0.5 KIT INTRAMUSCULAR ONCE
Qty: 1 EACH | Refills: 0 | Status: CANCELLED | OUTPATIENT
Start: 2023-11-30 | End: 2023-11-30

## 2023-11-30 ASSESSMENT — ACTIVITIES OF DAILY LIVING (ADL): CURRENT_FUNCTION: NO ASSISTANCE NEEDED

## 2023-11-30 ASSESSMENT — ENCOUNTER SYMPTOMS
CHILLS: 0
PALPITATIONS: 0
ABDOMINAL PAIN: 0
MYALGIAS: 0
DIARRHEA: 0
SORE THROAT: 0
EYE PAIN: 0
DIZZINESS: 0
HEMATOCHEZIA: 0
FEVER: 0
CONSTIPATION: 0
COUGH: 0
DYSURIA: 0
JOINT SWELLING: 0
NAUSEA: 0
HEMATURIA: 0
HEADACHES: 0
NERVOUS/ANXIOUS: 0
SHORTNESS OF BREATH: 0
HEARTBURN: 1
ARTHRALGIAS: 0
PARESTHESIAS: 0
WEAKNESS: 0

## 2023-11-30 ASSESSMENT — PAIN SCALES - GENERAL: PAINLEVEL: NO PAIN (0)

## 2023-11-30 NOTE — PATIENT INSTRUCTIONS
Patient Education   Personalized Prevention Plan  You are due for the preventive services outlined below.  Your care team is available to assist you in scheduling these services.  If you have already completed any of these items, please share that information with your care team to update in your medical record.  Health Maintenance Due   Topic Date Due     ANNUAL REVIEW OF HM ORDERS  Never done     RSV VACCINE (Pregnancy & 60+) (1 - 1-dose 60+ series) Never done     AORTIC ANEURYSM SCREENING (SYSTEM ASSIGNED)  Never done     Annual Wellness Visit  11/28/2023     Learning About Dietary Guidelines  What are the Dietary Guidelines for Americans?     Dietary Guidelines for Americans provide tips for eating well and staying healthy. This helps reduce the risk for long-term (chronic) diseases.  These guidelines recommend that you:  Eat and drink the right amount for you. The U.[place] government's food guide is called MyPlate. It can help you make your own well-balanced eating plan.  Try to balance your eating with your activity. This helps you stay at a healthy weight.  Drink alcohol in moderation, if at all.  Limit foods high in salt, saturated fat, trans fat, and added sugar.  These guidelines are from the U.S. Department of Agriculture and the U.S. Department of Health and Human Services. They are updated every 5 years.  What is MyPlate?  MyPlate is the U.[place]. government's food guide. It can help you make your own well-balanced eating plan. A balanced eating plan means that you eat enough, but not too much, and that your food gives you the nutrients you need to stay healthy.  MyPlate focuses on eating plenty of whole grains, fruits, and vegetables, and on limiting fat and sugar. It is available online at www.ChooseMyPlate.gov.  How can you get started?  If you're trying to eat healthier, you can slowly change your eating habits over time. You don't have to make big changes all at once. Start by adding one or two healthy  "foods to your meals each day.  Grains  Choose whole-grain breads and cereals and whole-wheat pasta and whole-grain crackers.  Vegetables  Eat a variety of vegetables every day. They have lots of nutrients and are part of a heart-healthy diet.  Fruits  Eat a variety of fruits every day. Fruits contain lots of nutrients. Choose fresh fruit instead of fruit juice.  Protein foods  Choose fish and lean poultry more often. Eat red meat and fried meats less often. Dried beans, tofu, and nuts are also good sources of protein.  Dairy  Choose low-fat or fat-free products from this food group. If you have problems digesting milk, try eating cheese or yogurt instead.  Fats and oils  Limit fats and oils if you're trying to cut calories. Choose healthy fats when you cook. These include canola oil and olive oil.  Where can you learn more?  Go to https://www.[employer].net/patiented  Enter D676 in the search box to learn more about \"Learning About Dietary Guidelines.\"  Current as of: February 28, 2023               Content Version: 13.8    3156-6634 [employer].   Care instructions adapted under license by your healthcare professional. If you have questions about a medical condition or this instruction, always ask your healthcare professional. [employer] disclaims any warranty or liability for your use of this information.      Hearing Loss: Care Instructions  Overview     Hearing loss is a sudden or slow decrease in how well you hear. It can range from slight to profound. Permanent hearing loss can occur with aging. It also can happen when you are exposed long-term to loud noise. Examples include listening to loud music, riding motorcycles, or being around other loud machines.  Hearing loss can affect your work and home life. It can make you feel lonely or depressed. You may feel that you have lost your independence. But hearing aids and other devices can help you hear better and feel connected to " others.  Follow-up care is a key part of your treatment and safety. Be sure to make and go to all appointments, and call your doctor if you are having problems. It's also a good idea to know your test results and keep a list of the medicines you take.  How can you care for yourself at home?  Avoid loud noises whenever possible. This helps keep your hearing from getting worse.  Always wear hearing protection around loud noises.  Wear a hearing aid as directed.  A professional can help you pick a hearing aid that will work best for you.  You can also get hearing aids over the counter for mild to moderate hearing loss.  Have hearing tests as your doctor suggests. They can show whether your hearing has changed. Your hearing aid may need to be adjusted.  Use other devices as needed. These may include:  Telephone amplifiers and hearing aids that can connect to a television, stereo, radio, or microphone.  Devices that use lights or vibrations. These alert you to the doorbell, a ringing telephone, or a baby monitor.  Television closed-captioning. This shows the words at the bottom of the screen. Most new TVs can do this.  TTY (text telephone). This lets you type messages back and forth on the telephone instead of talking or listening. These devices are also called TDD. When messages are typed on the keyboard, they are sent over the phone line to a receiving TTY. The message is shown on a monitor.  Use text messaging, social media, and email if it is hard for you to communicate by telephone.  Try to learn a listening technique called speechreading. It is not lipreading. You pay attention to people's gestures, expressions, posture, and tone of voice. These clues can help you understand what a person is saying. Face the person you are talking to, and have them face you. Make sure the lighting is good. You need to see the other person's face clearly.  Think about counseling if you need help to adjust to your hearing loss.  When  "should you call for help?  Watch closely for changes in your health, and be sure to contact your doctor if:    You think your hearing is getting worse.     You have new symptoms, such as dizziness or nausea.   Where can you learn more?  Go to https://www.[employer].net/patiented  Enter R798 in the search box to learn more about \"Hearing Loss: Care Instructions.\"  Current as of: February 28, 2023               Content Version: 13.8    3407-0146 [employer].   Care instructions adapted under license by your healthcare professional. If you have questions about a medical condition or this instruction, always ask your healthcare professional. [employer] disclaims any warranty or liability for your use of this information.         "

## 2023-11-30 NOTE — PROGRESS NOTES
"The patient was counseled and encouraged to consider modifying their diet and eating habits. He was provided with information on recommended healthy diet options.  The patient was provided with written information regarding signs of hearing loss.  Answers submitted by the patient for this visit:  Annual Preventive Visit (Submitted on 11/27/2023)  Chief Complaint: Annual Exam:  In general, how would you rate your overall physical health?: good  Frequency of exercise:: 2-3 days/week  Do you usually eat at least 4 servings of fruit and vegetables a day, include whole grains & fiber, and avoid regularly eating high fat or \"junk\" foods? : No  Taking medications regularly:: Yes  Medication side effects:: None  Activities of Daily Living: no assistance needed  Home safety: lack of grab bars in the bathroom  Hearing Impairment:: difficulty following a conversation in a noisy restaurant or crowded room, difficulty understanding soft or whispered speech  In the past 6 months, have you been bothered by leaking of urine?: No  abdominal pain: No  Blood in stool: No  Blood in urine: No  chest pain: No  chills: No  congestion: No  constipation: No  cough: No  diarrhea: No  dizziness: No  ear pain: No  eye pain: No  nervous/anxious: No  fever: No  genital sores: No  headaches: No  hearing loss: Yes  heartburn: Yes  arthralgias: No  joint swelling: No  peripheral edema: No  mood changes: No  myalgias: No  nausea: No  dysuria: No  palpitations: No  Skin sensation changes: No  sore throat: No  urgency: No  rash: No  shortness of breath: No  visual disturbance: No  weakness: No  impotence: Yes  penile discharge: No  In general, how would you rate your overall mental or emotional health?: good  Additional concerns today:: No  Exercise outside of work (Submitted on 11/27/2023)  Chief Complaint: Annual Exam:  Duration of exercise:: Less than 15 minutes    "

## 2023-11-30 NOTE — PROGRESS NOTES
"SUBJECTIVE:   Juan Jose is a 72 year old, presenting for the following:  Wellness Visit        11/30/2023     7:58 AM   Additional Questions   Roomed by Diann HANSEN       Are you in the first 12 months of your Medicare coverage?  No    Healthy Habits:     In general, how would you rate your overall health?  Good    Frequency of exercise:  2-3 days/week    Duration of exercise:  Less than 15 minutes    Do you usually eat at least 4 servings of fruit and vegetables a day, include whole grains    & fiber and avoid regularly eating high fat or \"junk\" foods?  No    Taking medications regularly:  Yes    Medication side effects:  None    Ability to successfully perform activities of daily living:  No assistance needed    Home Safety:  Lack of grab bars in the bathroom    Hearing Impairment:  Difficulty following a conversation in a noisy restaurant or crowded room and difficulty understanding soft or whispered speech    In the past 6 months, have you been bothered by leaking of urine?  No    In general, how would you rate your overall mental or emotional health?  Good    Additional concerns today:  No      Today's PHQ-2 Score:       11/30/2023     7:46 AM   PHQ-2 ( 1999 [employer])   Q1: Little interest or pleasure in doing things 0   Q2: Feeling down, depressed or hopeless 0   PHQ-2 Score 0   Q1: Little interest or pleasure in doing things Not at all   Q2: Feeling down, depressed or hopeless Not at all   PHQ-2 Score 0           Have you ever done Advance Care Planning? (For example, a Health Directive, POLST, or a discussion with a medical provider or your loved ones about your wishes): Yes, advance care planning is on file.    Has new hearin gaids which he likes   Fall risk  Fallen 2 or more times in the past year?: No  Any fall with injury in the past year?: No    Cognitive Screening   1) Repeat 3 items (Leader, Season, Table)    2) Clock draw: NORMAL  3) 3 item recall: Recalls 3 objects  Results: 3 items recalled: COGNITIVE " IMPAIRMENT LESS LIKELY    Mini-CogTM Copyright MELECIO Lutz. Licensed by the author for use in [facility]; reprinted with permission (jhoan@.[email or web address]). All rights reserved.      Do you have sleep apnea, excessive snoring or daytime drowsiness? : no    Reviewed and updated as needed this visit by clinical staff   Tobacco  Allergies  Meds              Reviewed and updated as needed this visit by Provider                 Social History     Tobacco Use    Smoking status: Never    Smokeless tobacco: Never   Substance Use Topics    Alcohol use: Yes     Comment: 1-2 beers a week              11/27/2023     4:51 PM   Alcohol Use   Prescreen: >3 drinks/day or >7 drinks/week? No          No data to display              Do you have a current opioid prescription? No  Do you use any other controlled substances or medications that are not prescribed by a provider? None          Hyperlipidemia Follow-Up    Are you regularly taking any medication or supplement to lower your cholesterol?   No  Are you having muscle aches or other side effects that you think could be caused by your cholesterol lowering medication?  No    Hypertension Follow-up    Do you check your blood pressure regularly outside of the clinic? Yes   Are you following a low salt diet? Yes  Are your blood pressures ever more than 140 on the top number (systolic) OR more   than 90 on the bottom number (diastolic), for example 140/90? No    Current providers sharing in care for this patient include:   Patient Care Team:  Alphonso Grover MD as PCP - General (Family Practice)  Imelda Rincon PA-C as Physician Assistant (Physician Assistant)  eSrvando Diane MD as MD (Gastroenterology)  Ernesto Blankenship MD as Assigned Sleep Provider  Alphonso Grover MD as Assigned PCP  Thea Sosa CNP as Nurse Practitioner (Urology)  Sharon Lozoya, JADE as Registered Nurse  Imelda Rincon PA-C as Assigned  Surgical Provider    The following health maintenance items are reviewed in Epic and correct as of today:  Health Maintenance   Topic Date Due    ANNUAL REVIEW OF HM ORDERS  Never done    RSV VACCINE (Pregnancy & 60+) (1 - 1-dose 60+ series) Never done    AORTIC ANEURYSM SCREENING (SYSTEM ASSIGNED)  Never done    MEDICARE ANNUAL WELLNESS VISIT  11/28/2023    FALL RISK ASSESSMENT  11/30/2024    COLORECTAL CANCER SCREENING  12/06/2024    DTAP/TDAP/TD IMMUNIZATION (3 - Td or Tdap) 06/26/2027    LIPID  11/28/2027    ADVANCE CARE PLANNING  12/05/2027    HEPATITIS C SCREENING  Completed    PHQ-2 (once per calendar year)  Completed    INFLUENZA VACCINE  Completed    Pneumococcal Vaccine: 65+ Years  Completed    ZOSTER IMMUNIZATION  Completed    COVID-19 Vaccine  Completed    IPV IMMUNIZATION  Aged Out    HPV IMMUNIZATION  Aged Out    MENINGITIS IMMUNIZATION  Aged Out    RSV MONOCLONAL ANTIBODY  Aged Out     Lab work is in process    Has decreased urinary stream      Review of Systems   Constitutional:  Negative for chills and fever.   HENT:  Positive for hearing loss. Negative for congestion, ear pain and sore throat.    Eyes:  Negative for pain and visual disturbance.   Respiratory:  Negative for cough and shortness of breath.    Cardiovascular:  Negative for chest pain, palpitations and peripheral edema.   Gastrointestinal:  Positive for heartburn. Negative for abdominal pain, constipation, diarrhea, hematochezia and nausea.   Genitourinary:  Positive for impotence. Negative for dysuria, genital sores, hematuria, penile discharge and urgency.   Musculoskeletal:  Negative for arthralgias, joint swelling and myalgias.   Skin:  Negative for rash.   Neurological:  Negative for dizziness, weakness, headaches and paresthesias.   Psychiatric/Behavioral:  Negative for mood changes. The patient is not nervous/anxious.          OBJECTIVE:   /78 (BP Location: Left arm, Patient Position: Sitting, Cuff Size: Adult Large)    "Pulse 83   Temp 98.1  F (36.7  C) (Temporal)   Resp 20   Ht 1.91 m (6' 3.2\")   Wt 134 kg (295 lb 8 oz)   SpO2 96%   BMI 36.74 kg/m   Estimated body mass index is 36.74 kg/m  as calculated from the following:    Height as of this encounter: 1.91 m (6' 3.2\").    Weight as of this encounter: 134 kg (295 lb 8 oz).  Physical Exam  GENERAL: healthy, alert and no distress  EYES: Eyes grossly normal to inspection, PERRL and conjunctivae and sclerae normal  HENT: ear canals and TM's normal, nose and mouth without ulcers or lesions  NECK: no adenopathy, no asymmetry, masses, or scars and thyroid normal to palpation  RESP: lungs clear to auscultation - no rales, rhonchi or wheezes  CV: regular rate and rhythm, normal S1 S2, no S3 or S4, no murmur, click or rub, no peripheral edema and peripheral pulses strong  CV: varicosities lower legs  ABDOMEN: soft, nontender, no hepatosplenomegaly, no masses and bowel sounds normal  MS: no gross musculoskeletal defects noted, no edema  SKIN: varicosities - lower legs  NEURO: Normal strength and tone, mentation intact and speech normal  PSYCH: mentation appears normal, affect normal/bright  LYMPH: no cervical, supraclavicular, axillary, or inguinal adenopathy  Diabetic foot exam: normal DP and PT pulses, no trophic changes or ulcerative lesions, and normal sensory exam        ASSESSMENT / PLAN:   (Z00.00) Encounter for Medicare annual wellness exam  (primary encounter diagnosis)  Comment: overall well but needs to lose weight  Plan: diet / exercise    (I10) Hypertension goal BP (blood pressure) < 140/90  Comment: Well controlled   Plan: Albumin Random Urine Quantitative with Creat         Ratio, Lipid panel reflex to direct LDL         Fasting, Comprehensive metabolic panel (BMP +         Alb, Alk Phos, ALT, AST, Total. Bili, TP),         OFFICE/OUTPT VISIT,EST,LEVL III        \    (K21.9) Gastroesophageal reflux disease, unspecified whether esophagitis present  Comment: Well " "controlled   Plan: PPi    (R73.09) Elevated glucose  Comment: check for DIABETES MELLITUS   Plan: Hemoglobin A1c, OFFICE/OUTPT VISIT,EST,LEVL III        \    (R35.0) Urinary frequency  Comment: likely BPH  Plan: UA with Microscopic reflex to Culture - lab         collect, OFFICE/OUTPT VISIT,EST,LEVL III            (Z12.5) Screening for prostate cancer  Comment: sent  Plan: PSA, screen        \        COUNSELING:  Reviewed preventive health counseling, as reflected in patient instructions       Regular exercise       Healthy diet/nutrition       Vision screening       Hearing screening       Dental care       Bladder control       Immunizations  UTD           Colon cancer screening       Prostate cancer screening      BMI:   Estimated body mass index is 36.74 kg/m  as calculated from the following:    Height as of this encounter: 1.91 m (6' 3.2\").    Weight as of this encounter: 134 kg (295 lb 8 oz).   Weight management plan: Discussed healthy diet and exercise guidelines      He reports that he has never smoked. He has never used smokeless tobacco.      Appropriate preventive services were discussed with this patient, including applicable screening as appropriate for fall prevention, nutrition, physical activity, Tobacco-use cessation, weight loss and cognition.  Checklist reviewing preventive services available has been given to the patient.    Reviewed patients plan of care and provided an AVS. The Intermediate Care Plan ( asthma action plan, low back pain action plan, and migraine action plan) for Juan Jose meets the Care Plan requirement. This Care Plan has been established and reviewed with the Patient.          Alphonso Grover MD  [facility]    Identified Health Risks:    "

## 2024-01-02 DIAGNOSIS — I10 HYPERTENSION GOAL BP (BLOOD PRESSURE) < 140/90: ICD-10-CM

## 2024-01-02 RX ORDER — METOPROLOL SUCCINATE 100 MG/1
100 TABLET, EXTENDED RELEASE ORAL DAILY
Qty: 90 TABLET | Refills: 3 | Status: SHIPPED | OUTPATIENT
Start: 2024-01-02

## 2024-01-24 DIAGNOSIS — L30.4 INTERTRIGO: ICD-10-CM

## 2024-01-24 RX ORDER — TRIAMCINOLONE ACETONIDE 1 MG/G
OINTMENT TOPICAL
Qty: 30 G | Refills: 0 | Status: SHIPPED | OUTPATIENT
Start: 2024-01-24

## 2024-01-25 NOTE — TELEPHONE ENCOUNTER
8/21/2023  [facility] [place]     Sergey, Imelda Carrillo PA-C  Dermatology   RF process 4. RTC 6 months

## 2024-02-02 ASSESSMENT — SLEEP AND FATIGUE QUESTIONNAIRES
HOW LIKELY ARE YOU TO NOD OFF OR FALL ASLEEP WHILE SITTING QUIETLY AFTER LUNCH WITHOUT ALCOHOL: SLIGHT CHANCE OF DOZING
HOW LIKELY ARE YOU TO NOD OFF OR FALL ASLEEP IN A CAR, WHILE STOPPED FOR A FEW MINUTES IN TRAFFIC: WOULD NEVER DOZE
HOW LIKELY ARE YOU TO NOD OFF OR FALL ASLEEP WHILE LYING DOWN TO REST IN THE AFTERNOON WHEN CIRCUMSTANCES PERMIT: MODERATE CHANCE OF DOZING
HOW LIKELY ARE YOU TO NOD OFF OR FALL ASLEEP WHILE SITTING AND TALKING TO SOMEONE: WOULD NEVER DOZE
HOW LIKELY ARE YOU TO NOD OFF OR FALL ASLEEP WHILE SITTING INACTIVE IN A PUBLIC PLACE: WOULD NEVER DOZE
HOW LIKELY ARE YOU TO NOD OFF OR FALL ASLEEP WHILE WATCHING TV: SLIGHT CHANCE OF DOZING
HOW LIKELY ARE YOU TO NOD OFF OR FALL ASLEEP WHILE SITTING AND READING: SLIGHT CHANCE OF DOZING
HOW LIKELY ARE YOU TO NOD OFF OR FALL ASLEEP WHEN YOU ARE A PASSENGER IN A CAR FOR AN HOUR WITHOUT A BREAK: SLIGHT CHANCE OF DOZING

## 2024-02-05 DIAGNOSIS — K21.9 GERD (GASTROESOPHAGEAL REFLUX DISEASE): ICD-10-CM

## 2024-02-05 RX ORDER — OMEPRAZOLE 40 MG/1
CAPSULE, DELAYED RELEASE ORAL
Qty: 90 CAPSULE | Refills: 1 | Status: SHIPPED | OUTPATIENT
Start: 2024-02-05 | End: 2024-08-06

## 2024-02-06 ENCOUNTER — OFFICE VISIT (OUTPATIENT)
Dept: SLEEP MEDICINE | Facility: CLINIC | Age: 74
End: 2024-02-06
Payer: COMMERCIAL

## 2024-02-06 VITALS
HEART RATE: 87 BPM | SYSTOLIC BLOOD PRESSURE: 126 MMHG | HEIGHT: 75 IN | BODY MASS INDEX: 37.67 KG/M2 | DIASTOLIC BLOOD PRESSURE: 77 MMHG | OXYGEN SATURATION: 95 % | WEIGHT: 303 LBS

## 2024-02-06 DIAGNOSIS — G47.33 OSA (OBSTRUCTIVE SLEEP APNEA): Primary | ICD-10-CM

## 2024-02-06 DIAGNOSIS — E66.9 OBESITY (BMI 30-39.9): ICD-10-CM

## 2024-02-06 PROCEDURE — 99213 OFFICE O/P EST LOW 20 MIN: CPT | Performed by: INTERNAL MEDICINE

## 2024-02-06 NOTE — PROGRESS NOTES
Obstructive Sleep Apnea - PAP Follow-Up Visit:    Chief Complaint   Patient presents with    CPAP Follow Up       Juan Jose Razo  is a very pleasant 73  year old male who comes in today for follow-up of their severe sleep apnea, managed with CPAP,  and to review CPAP compliance measures.     He was set up at [facility] on September 22, 2023. Patient received a RESMED Airsense 11 Pressures were set at  10-17 cm H2O.   Patient s ramp is AUTO cm H2O for 5 and FLEX/EPR is 2.  Patient received a RESMED Mask name: [id number]  Full Face mask size Large, heated tubing and heated humidifier.  Patient has the following compliance requirements: using and visit requirements       Do you use a CPAP Machine at home: Yes  Overall, on a scale of 0-10 how would you rate your CPAP (0 poor, 10 great): 10  Is your mask comfortable: Yes  If not, why:    Is you mask leaking: No  If yes, where do you feel it:    How many night per week does the mask leak (0-7):    Do you notice snoring with mask on: No  Do you notice gasping arousals with mask on: No  Are you having significant oral or nasal dryness: No  Is the pressure setting comfortable: Yes  In not, why:    What type of mask do you use: Full Face Mask  What is your typical bedtime: 10:30 pm  How long does it take you to go to sleep on PAP therapy: 15 minutes  What time do you typically get out of bed for the day: 7:15 am  How many hours on average per night are you using PAP therapy: All hours asleep  How many hours are you sleeping per night: 8  Do you feel well rested in the morning: Yes    EPWORTH SLEEPINESS SCALE         2/2/2024     8:53 AM    [place] Sleepiness Scale ( SEAMUS De Luna  [id number]<br>ESS - [place]/English - Final version - 21 Nov 07 - [facility].)   Sitting and reading Slight chance of dozing   Watching TV Slight chance of dozing   Sitting, inactive in a public place (e.g. a theatre or a meeting) Would never doze   As a passenger in a car for an hour without a break  Slight chance of dozing   Lying down to rest in the afternoon when circumstances permit Moderate chance of dozing   Sitting and talking to someone Would never doze   Sitting quietly after a lunch without alcohol Slight chance of dozing   In a car, while stopped for a few minutes in traffic Would never doze   A[place]ora Score (MC) 6   A[place]ora Score (Sleep) 6       INSOMNIA SEVERITY INDEX (SUE)          2/2/2024     8:45 AM   Insomnia Severity Index (SUE)   Difficulty falling asleep 0   Difficulty staying asleep 0   Problems waking up too early 0   How SATISFIED/DISSATISFIED are you with your CURRENT sleep pattern? 0   How NOTICEABLE to others do you think your sleep problem is in terms of impairing the quality of your life? 0   How WORRIED/DISTRESSED are you about your current sleep problem? 0   To what extent do you consider your sleep problem to INTERFERE with your daily functioning (e.g. daytime fatigue, mood, ability to function at work/daily chores, concentration, memory, mood, etc.) CURRENTLY? 0   SUE Total Score 0       Guidelines for Scoring/Interpretation:  Total score categories:  0-7 = No clinically significant insomnia   8-14 = Subthreshold insomnia   15-21 = Clinical insomnia (moderate severity)  22-28 = Clinical insomnia (severe)  Used via courtesy of www.Pervacioth.va.gov with permission from Curtis Rainey PhD., [facility]    ResMed   Auto-PAP 10 - 17 cmH2O 30 day usage data:    100% of days with > 4 hours of use. 0/30 days with no use.   Average use 8 hours and 27 minutes per day.   95%ile Leak 14.3 L/min.   CPAP 95% pressure 15.3 cm.   AHI 4.3 events per hour.       Problem List:  Patient Active Problem List    Diagnosis Date Noted    Morbid obesity (H) 05/23/2018     Priority: Medium    Multiple benign nevi 09/06/2015     Priority: Medium    Seborrheic keratoses 09/06/2015     Priority: Medium    Skin cancer screening 09/06/2015     Priority: Medium    Skin exam, screening for cancer 10/24/2013  "    Priority: Medium    CARDIOVASCULAR SCREENING; LDL GOAL LESS THAN 130 03/06/2012     Priority: Medium    Hypertension goal BP (blood pressure) < 140/90 03/06/2012     Priority: Medium    GERD (gastroesophageal reflux disease) 03/06/2012     Priority: Medium    Seasonal allergic rhinitis 03/06/2012     Priority: Medium               Physical Examination:     /77   Pulse 87   Ht 1.905 m (6' 3\")   Wt 137.4 kg (303 lb)   SpO2 95%   BMI 37.87 kg/m    General: Pleasant. Cooperative. In no apparent distress.  Pulmonary: Able to speak in full sentences easily. No cough or wheeze.   Neurologic: Alert, oriented x3.  Psychiatric: Mood euthymic. Affect congruent with full range and intensity.    Impression/Plan:  Obstructive sleep apnea:Pt reports adequate compliance with CPAP and reports positive benefits with CPAP treatment. Based on compliance measures, BUCK is adequately controlled with CPAP at the current settings.    Recommended to continue using the CPAP regularly during sleep and getting the supplies for the CPAP replaced regularly.   We discussed  tips to address the concerns he reported regarding the water chamber getting emptied too soon and if the remedies don't work, he was instructed to follow-up with [facility] medical DME provider. Patient instructed to remember to bring PAP with him if hospitalized and if anticipating procedure that requires sedation/surgery to be sure to discuss with the provider/surgeon that he  has sleep apnea and uses PAP therapy.     Obesity: We discussed weight management with healthy diet, and exercise.     Patient was strongly advised to avoid driving, operating any heavy machinery or other hazardous situations while drowsy or sleepy.  Patient was counseled on the importance of driving while alert, to pull over if drowsy, or nap before getting into the vehicle if sleepy.      Follow-up with sleep clinic in 1 year or sooner if any concerns.     CC:  Alphonso Grover " "MD Ceasar    The above note was dictated using voice recognition software. Although reviewed after completion, some word and grammatical error may remain . Please contact the author for any clarifications.      \" Total time spent was 22 minutes  for this appointment on this date of service which include time spent before, during and after the visit for chart review, patient care, counseling and coordination of care. Including documentation\"       Ernesto Blankenship MD  [facility] [place]  [address], [address], [place] [address]        "

## 2024-02-06 NOTE — PATIENT INSTRUCTIONS
Why might your humidifier run out during the night?  There can be many causes of an empty humidifier on your CPAP, but the most common is therapy leaks. When your mask leaks or your tube leaks, the air escaping draws moisture from the humidifier faster, leaving you without water for your therapy. The other way therapy leaks happen is through your mouth. If you re using a nasal mask, but your mouth sits open at night, you ll lose all the moisture that way. Worse, you ll also lose your CPAP therapy benefits as well!    After mask leaks, the second most common cause of empty water chambers is the climate of the room you sleep in. If you live somewhere temperate where your rooms is consistent through the year, you may not notice a different. But in more seasonal regions, winter means drier air all around, and heating in your house makes it even drier! This will empty your humidifier faster as the dry air passes over the water in the chamber. Conversely, sleeping in a very cold room will lead to the moisture reforming into water droplets throughout the tube. This is called rainout, and will also run your humidifier harder, leading to an empty water chamber sooner.    Your water chamber could have hairline cracks leaking water slowly through the night. This one you d probably notice in the morning pooled around the machine, but if your room is dry enough it s possible it evaporates before you wake up. And of course, there s the small possibility your humidifier or CPAP is malfunctioning, and you have an uncommon problem instead of a common one.        Your BMI is Body mass index is 37.87 kg/m .    What is BMI?  Body mass index (BMI) is one way to tell whether you are at a healthy weight, overweight, or obese. It measures your weight in relation to your height.  A BMI of 18.5 to 24.9 is in the healthy range. A person with a BMI of 25 to 29.9 is considered overweight, and someone with a BMI of 30 or greater is considered  obese.  Another way to find out if you are at risk for health problems caused by overweight and obesity is to measure your waist. If you are a woman and your waist is more than 35 inches, or if you are a man and your waist is more than 40 inches, your risk of disease may be higher.  More than two-thirds of American adults are considered overweight or obese. Being overweight or obese increases the risk for further weight gain.  Excess weight may lead to heart disease and diabetes. Creating and following plans for healthy eating and physical activity may help you improve your health.    Methods for maintaining or losing weight.  Weight control is part of healthy lifestyle and includes exercise, emotional health, and healthy eating habits.  Careful eating habits lifelong is the mainstay of weight control.  Though there are significant health benefits from weight loss, long-term weight loss with diet alone may be very difficult to achieve- studies show long-term success with dietary management in less than 10% of people. Attaining a healthy weight may be especially difficult to achieve in those with severe obesity. In some cases, medications, devices and surgical management might be considered.    What can you do?  If you are overweight or obese and are interested in methods for weight loss, you should discuss this with your provider. In addition, we recommend that you review healthy life styles and methods for weight loss available through the National Institutes of Health patient information sites:   http://win.niddk.nih.gov/publications/index.htm          Your Body mass index is 37.87 kg/m .  Weight management is a personal decision.  If you are interested in exploring weight loss strategies, the following discussion covers the approaches that may be successful. Body mass index (BMI) is one way to tell whether you are at a healthy weight, overweight, or obese. It measures your weight in relation to your height.  A  BMI of 18.5 to 24.9 is in the healthy range. A person with a BMI of 25 to 29.9 is considered overweight, and someone with a BMI of 30 or greater is considered obese. More than two-thirds of American adults are considered overweight or obese.  Being overweight or obese increases the risk for further weight gain. Excess weight may lead to heart disease and diabetes.  Creating and following plans for healthy eating and physical activity may help you improve your health.  Weight control is part of healthy lifestyle and includes exercise, emotional health, and healthy eating habits. Careful eating habits lifelong are the mainstay of weight control. Though there are significant health benefits from weight loss, long-term weight loss with diet alone may be very difficult to achieve- studies show long-term success with dietary management in less than 10% of people. Attaining a healthy weight may be especially difficult to achieve in those with severe obesity. In some cases, medications, devices and surgical management might be considered.  What can you do?  If you are overweight or obese and are interested in methods for weight loss, you should discuss this with your provider.   Consider reducing daily calorie intake by 500 calories.   Keep a food journal.   Avoiding skipping meals, consider cutting portions instead.    Diet combined with exercise helps maintain muscle while optimizing fat loss. Strength training is particularly important for building and maintaining muscle mass. Exercise helps reduce stress, increase energy, and improves fitness. Increasing exercise without diet control, however, may not burn enough calories to loose weight.     Start walking three days a week 10-20 minutes at a time  Work towards walking thirty minutes five days a week   Eventually, increase the speed of your walking for 1-2 minutes at time    In addition, we recommend that you review healthy lifestyles and methods for weight loss  available through the National Institutes of Health patient information sites:  http://win.niddk.nih.gov/publications/index.htm    And look into health and wellness programs that may be available through your health insurance provider, employer, local community center, or [employer].

## 2024-02-06 NOTE — NURSING NOTE
Sent 1 year reminder via [employer]. Printed AVS.     Nicki Knapp   Clinic Assistant  [facility]

## 2024-02-20 ENCOUNTER — OFFICE VISIT (OUTPATIENT)
Dept: FAMILY MEDICINE | Facility: CLINIC | Age: 74
End: 2024-02-20
Payer: COMMERCIAL

## 2024-02-20 VITALS
HEART RATE: 91 BPM | RESPIRATION RATE: 8 BRPM | BODY MASS INDEX: 37.8 KG/M2 | OXYGEN SATURATION: 97 % | TEMPERATURE: 98.3 F | HEIGHT: 75 IN | SYSTOLIC BLOOD PRESSURE: 148 MMHG | WEIGHT: 304 LBS | DIASTOLIC BLOOD PRESSURE: 72 MMHG

## 2024-02-20 DIAGNOSIS — J18.9 PNEUMONIA OF BOTH UPPER LOBES DUE TO INFECTIOUS ORGANISM: Primary | ICD-10-CM

## 2024-02-20 DIAGNOSIS — I10 HYPERTENSION GOAL BP (BLOOD PRESSURE) < 140/90: ICD-10-CM

## 2024-02-20 LAB
ANION GAP SERPL CALCULATED.3IONS-SCNC: 11 MMOL/L (ref 7–15)
BUN SERPL-MCNC: 11.3 MG/DL (ref 8–23)
CALCIUM SERPL-MCNC: 9.6 MG/DL (ref 8.8–10.2)
CHLORIDE SERPL-SCNC: 101 MMOL/L (ref 98–107)
CREAT SERPL-MCNC: 1.01 MG/DL (ref 0.67–1.17)
DEPRECATED HCO3 PLAS-SCNC: 28 MMOL/L (ref 22–29)
EGFRCR SERPLBLD CKD-EPI 2021: 80 ML/MIN/1.73M2
ERYTHROCYTE [DISTWIDTH] IN BLOOD BY AUTOMATED COUNT: 12.6 % (ref 10–15)
GLUCOSE SERPL-MCNC: 88 MG/DL (ref 70–99)
HCT VFR BLD AUTO: 46.1 % (ref 40–53)
HGB BLD-MCNC: 15.6 G/DL (ref 13.3–17.7)
MCH RBC QN AUTO: 31.2 PG (ref 26.5–33)
MCHC RBC AUTO-ENTMCNC: 33.8 G/DL (ref 31.5–36.5)
MCV RBC AUTO: 92 FL (ref 78–100)
PLATELET # BLD AUTO: 204 10E3/UL (ref 150–450)
POTASSIUM SERPL-SCNC: 4.2 MMOL/L (ref 3.4–5.3)
RBC # BLD AUTO: 5 10E6/UL (ref 4.4–5.9)
SODIUM SERPL-SCNC: 140 MMOL/L (ref 135–145)
WBC # BLD AUTO: 7.1 10E3/UL (ref 4–11)

## 2024-02-20 PROCEDURE — 36415 COLL VENOUS BLD VENIPUNCTURE: CPT | Performed by: FAMILY MEDICINE

## 2024-02-20 PROCEDURE — 80048 BASIC METABOLIC PNL TOTAL CA: CPT | Performed by: FAMILY MEDICINE

## 2024-02-20 PROCEDURE — 85027 COMPLETE CBC AUTOMATED: CPT | Performed by: FAMILY MEDICINE

## 2024-02-20 PROCEDURE — 99214 OFFICE O/P EST MOD 30 MIN: CPT | Performed by: FAMILY MEDICINE

## 2024-02-20 RX ORDER — AZITHROMYCIN 250 MG/1
TABLET, FILM COATED ORAL
Qty: 6 TABLET | Refills: 0 | Status: SHIPPED | OUTPATIENT
Start: 2024-02-20 | End: 2024-02-25

## 2024-02-20 ASSESSMENT — PAIN SCALES - GENERAL: PAINLEVEL: MILD PAIN (2)

## 2024-02-20 ASSESSMENT — ENCOUNTER SYMPTOMS: COUGH: 1

## 2024-02-20 NOTE — PROGRESS NOTES
Assessment & Plan     Pneumonia of both upper lobes due to infectious organism  Scattered rhonchi, worsening  - azithromycin (ZITHROMAX) 250 MG tablet; Take 2 tablets (500 mg) by mouth daily for 1 day, THEN 1 tablet (250 mg) daily for 4 days.  - CBC with platelets; Future    Hypertension goal BP (blood pressure) < 140/90  Elevted   Check daily   Follow up in 1 month.   - Basic metabolic panel  (Ca, Cl, CO2, Creat, Gluc, K, Na, BUN); Future              See Patient Instructions    Colt Ingram is a 73 year old, presenting for the following health issues:  Cough    First throat tickle on 2/9, now deep chest cough        2/20/2024     9:40 AM   Additional Questions   Roomed by Gris Zheng     History of Present Illness       Reason for visit:  Respiratory infection  Symptom onset:  1-2 weeks ago  Symptoms include:  Productive cough. Decreasing energy.  Symptom intensity:  Moderate  Symptom progression:  Worsening  Had these symptoms before:  Yes  Has tried/received treatment for these symptoms:  Yes  Previous treatment was successful:  Yes  Prior treatment description:  Antibiotic    He eats 0-1 servings of fruits and vegetables daily.He consumes 1 sweetened beverage(s) daily.He exercises with enough effort to increase his heart rate 9 or less minutes per day.  He exercises with enough effort to increase his heart rate 3 or less days per week.   He is taking medications regularly.           Hypertension Follow-up    Do you check your blood pressure regularly outside of the clinic? Yes   Are you following a low salt diet? Yes  Are your blood pressures ever more than 140 on the top number (systolic) OR more   than 90 on the bottom number (diastolic), for example 140/90? No        Review of Systems  Constitutional, HEENT, cardiovascular, pulmonary, gi and gu systems are negative, except as otherwise noted.      Objective    BP (!) 148/72   Pulse 91   Temp 98.3  F (36.8  C) (Temporal)   Resp (!) 8   Ht 1.905  "m (6' 3\")   Wt 137.9 kg (304 lb)   SpO2 97%   BMI 38.00 kg/m    Body mass index is 38 kg/m .  Physical Exam   GENERAL: alert and fatigued  NECK: no adenopathy, no asymmetry, masses, or scars  RESP: no rales , no rhonchi, and rhonchi bilateral  CV: regular rate and rhythm, normal S1 S2, no S3 or S4, no murmur, click or rub, no peripheral edema  ABDOMEN: soft, nontender, no hepatosplenomegaly, no masses and bowel sounds normal  MS: no gross musculoskeletal defects noted, no edema    Office Visit on 11/30/2023   Component Date Value Ref Range Status    Color Urine 11/30/2023 Yellow  Colorless, Straw, Light Yellow, Yellow Final    Appearance Urine 11/30/2023 Clear  Clear Final    Glucose Urine 11/30/2023 Negative  Negative mg/dL Final    Bilirubin Urine 11/30/2023 Negative  Negative Final    Ketones Urine 11/30/2023 Negative  Negative mg/dL Final    Specific Gravity Urine 11/30/2023 1.025  1.003 - 1.035 Final    Blood Urine 11/30/2023 Negative  Negative Final    pH Urine 11/30/2023 5.5  5.0 - 7.0 Final    Protein Albumin Urine 11/30/2023 Negative  Negative mg/dL Final    Urobilinogen Urine 11/30/2023 1.0  0.2, 1.0 E.U./dL Final    Nitrite Urine 11/30/2023 Negative  Negative Final    Leukocyte Esterase Urine 11/30/2023 Negative  Negative Final    Creatinine Urine mg/dL 11/30/2023 204.0  mg/dL Final    The reference ranges have not been established in urine creatinine. The results should be integrated into the clinical context for interpretation.    Albumin Urine mg/L 11/30/2023 13.8  mg/L Final    The reference ranges have not been established in urine albumin. The results should be integrated into the clinical context for interpretation.    Albumin Urine mg/g Cr 11/30/2023 6.76  0.00 - 17.00 mg/g Cr Final    Microalbuminuria is defined as an albumin:creatinine ratio of 17 to 299 for males and 25 to 299 for females. A ratio of albumin:creatinine of 300 or higher is indicative of overt proteinuria.  Due to biologic " variability, positive results should be confirmed by a second, first-morning random or 24-hour timed urine specimen. If there is discrepancy, a third specimen is recommended. When 2 out of 3 results are in the microalbuminuria range, this is evidence for incipient nephropathy and warrants increased efforts at glucose control, blood pressure control, and institution of therapy with an angiotensin-converting-enzyme (ACE) inhibitor (if the patient can tolerate it).      Prostate Specific Antigen Screen 11/30/2023 1.19  0.00 - 6.50 ng/mL Final    Cholesterol 11/30/2023 156  <200 mg/dL Final    Triglycerides 11/30/2023 142  <150 mg/dL Final    Direct Measure HDL 11/30/2023 33 (L)  >=40 mg/dL Final    LDL Cholesterol Calculated 11/30/2023 95  <=100 mg/dL Final    Non HDL Cholesterol 11/30/2023 123  <130 mg/dL Final    Sodium 11/30/2023 138  135 - 145 mmol/L Final    Reference intervals for this test were updated on 09/26/2023 to more accurately reflect our healthy population. There may be differences in the flagging of prior results with similar values performed with this method. Interpretation of those prior results can be made in the context of the updated reference intervals.     Potassium 11/30/2023 4.2  3.4 - 5.3 mmol/L Final    Carbon Dioxide (CO2) 11/30/2023 25  22 - 29 mmol/L Final    Anion Gap 11/30/2023 13  7 - 15 mmol/L Final    Urea Nitrogen 11/30/2023 12.8  8.0 - 23.0 mg/dL Final    Creatinine 11/30/2023 1.00  0.67 - 1.17 mg/dL Final    GFR Estimate 11/30/2023 81  >60 mL/min/1.73m2 Final    Calcium 11/30/2023 9.3  8.8 - 10.2 mg/dL Final    Chloride 11/30/2023 100  98 - 107 mmol/L Final    Glucose 11/30/2023 118 (H)  70 - 99 mg/dL Final    Alkaline Phosphatase 11/30/2023 76  40 - 150 U/L Final    Reference intervals for this test were updated on 11/14/2023 to more accurately reflect our healthy population. There may be differences in the flagging of prior results with similar values performed with this method.  Interpretation of those prior results can be made in the context of the updated reference intervals.    AST 11/30/2023 66 (H)  0 - 45 U/L Final    Reference intervals for this test were updated on 6/12/2023 to more accurately reflect our healthy population. There may be differences in the flagging of prior results with similar values performed with this method. Interpretation of those prior results can be made in the context of the updated reference intervals.    ALT 11/30/2023 62  0 - 70 U/L Final    Reference intervals for this test were updated on 6/12/2023 to more accurately reflect our healthy population. There may be differences in the flagging of prior results with similar values performed with this method. Interpretation of those prior results can be made in the context of the updated reference intervals.      Protein Total 11/30/2023 8.2  6.4 - 8.3 g/dL Final    Albumin 11/30/2023 4.6  3.5 - 5.2 g/dL Final    Bilirubin Total 11/30/2023 0.9  <=1.2 mg/dL Final    Hemoglobin A1C 11/30/2023 5.7 (H)  0.0 - 5.6 % Final    Normal <5.7%   Prediabetes 5.7-6.4%    Diabetes 6.5% or higher     Note: Adopted from ADA consensus guidelines.    Bacteria Urine 11/30/2023 None Seen  None Seen /HPF Final    RBC Urine 11/30/2023 0-2  0-2 /HPF /HPF Final    WBC Urine 11/30/2023 0-5  0-5 /HPF /HPF Final           Signed Electronically by: Alphonso Grover MD

## 2024-04-15 ENCOUNTER — OFFICE VISIT (OUTPATIENT)
Dept: DERMATOLOGY | Facility: CLINIC | Age: 74
End: 2024-04-15
Payer: COMMERCIAL

## 2024-04-15 DIAGNOSIS — L82.1 SEBORRHEIC KERATOSES: ICD-10-CM

## 2024-04-15 DIAGNOSIS — D18.01 CHERRY ANGIOMA: ICD-10-CM

## 2024-04-15 DIAGNOSIS — Z12.83 SKIN CANCER SCREENING: Primary | ICD-10-CM

## 2024-04-15 DIAGNOSIS — Z85.89 HISTORY OF SQUAMOUS CELL CARCINOMA: ICD-10-CM

## 2024-04-15 DIAGNOSIS — M67.449 DIGITAL MUCINOUS CYST OF FINGER: ICD-10-CM

## 2024-04-15 DIAGNOSIS — L57.0 ACTINIC KERATOSIS: ICD-10-CM

## 2024-04-15 DIAGNOSIS — L30.4 INTERTRIGO: ICD-10-CM

## 2024-04-15 DIAGNOSIS — D22.9 MULTIPLE BENIGN NEVI: ICD-10-CM

## 2024-04-15 PROCEDURE — 99213 OFFICE O/P EST LOW 20 MIN: CPT | Mod: 25 | Performed by: PHYSICIAN ASSISTANT

## 2024-04-15 PROCEDURE — 17000 DESTRUCT PREMALG LESION: CPT | Performed by: PHYSICIAN ASSISTANT

## 2024-04-15 RX ORDER — NYSTATIN 100000 U/G
CREAM TOPICAL
Qty: 60 G | Refills: 11 | Status: SHIPPED | OUTPATIENT
Start: 2024-04-15

## 2024-04-15 RX ORDER — TRIAMCINOLONE ACETONIDE 0.25 MG/G
OINTMENT TOPICAL 2 TIMES DAILY
Qty: 60 G | Refills: 5 | Status: SHIPPED | OUTPATIENT
Start: 2024-04-15

## 2024-04-15 ASSESSMENT — PAIN SCALES - GENERAL: PAINLEVEL: NO PAIN (0)

## 2024-04-15 NOTE — PROGRESS NOTES
[facility] Dermatology Note  Encounter Date: Apr 15, 2024  Office Visit       Dermatology Problem List:  1. 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 History of dysplastic nevus (per patient)  3. Onychomycosis  4. Aks-left dorsal nose  - s/p cryotherapy  5. 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 Intertrigo, groin  - Current tx:  triamcinolone 0.025% ointment, nystatin cream  Past tx:  - hydrocortisone 2.5% ointment,  7. 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/2010  - SK, R frontal scalp s/p shave biopsy 6/3/2014  - Intradermal melanocytic nevus (one tissue fragment) and multiple   fibroepithelial polyps , bilateral axillae s/p shave biopsy 12/29/2016  - Pilar cyst, L occipital scalp s/p shave biopsy 2/9/2017  Verrucous vulgaris, right lateral neck s/p biopsy 3/12/2018  - SK, Left parietal scalp, 9/12/2018  - Acrochordon, L upper posterior thigh, s/p bx 2/14/2022  - Macular SK and fibrosis, L top shoulder s/p bx 2/14/2022  - Digital mucoid cyst: left index finger  ____________________________________________    Assessment & Plan:    #Hx of  Actinic keratosis, left dorsal nose x 1   -s/p cryotherapy.     # Intertrigo, groin, stable well controlled, chronic.    - Continue nystatin cream daily PRN for flares to the groin. (Refilled today)  - Continue triamcinolone 0.025% ointment daily PRN for flares to the groin. (Refilled today)        # Cherry angiomas  # Seborrheic keratoses  Discussed the natural history and benign nature of this lesion. Reassurance provided that no additional treatment is necessary.      # Solar lentigines  # Multiple benign nevi  - No concerning lesions today  - Monitor for ABCDEs of melanoma   - Continue sun protection - recommend SPF 30 or higher with frequent application   - Return sooner if noticing changing or  symptomatic lesions      # History of NMSC. No evidence of recurrent disease.  # History of dysplastic nevus.  - Continue photoprotection - recommend SPF 30 or higher with frequent reapplication  - Continue yearly skin exams  - Advised to monitor for changing, non-healing, bleeding, painful, changing, or otherwise symptomatic lesions  - Future considerations: niacinamide 500 mg BID    # Actinic keratosis x 1,   - Cryotherapy performed today (see procedure note(s) below).       # Digital mucoid cyst, left index finger (periungual)  - Monitor: Patient to continue monitoring at home and will contact the clinic for any changes.  Defers excision at that time.         Procedures Performed:   - Cryotherapy procedure note, location(s): left dorsal nose. After verbal consent and discussion of risks and benefits including, but not limited to, dyspigmentation/scar, blister, and pain, 1 lesion(s) was(were) treated with 1-2 mm freeze border for 1-2 cycles with liquid nitrogen. Post cryotherapy instructions were provided.      Follow-up: 6 month(s) in-person, or earlier for new or changing lesions    Staff and Scribe:     Scribe Disclosure:   I, Roma Lerner, am serving as a scribe; to document services personally performed by Imelda Rincon PA-C -based on data collection and the provider's statements to me.     Provider Disclosure:  I agree with above History, Review of Systems, Physical exam and Plan.  I have reviewed the content of the documentation and have edited it as needed. I have personally performed the services documented here and the documentation accurately represents those services and the decisions I have made.      Electronically signed by:  All risks, benefits and alternatives were discussed with patient.  Patient is in agreement and understands the assessment and plan.  All questions were answered.  Sun Screen Education was given.   Return to Clinic in 6 months or sooner as needed.   Imelda Rincon  SANJUANITA         ____________________________________________    CC: Skin Check (6 month FBSE.  Spot of concern on the left pointer finger, possible cyst )    HPI:  Mr. Juan Jose Razo is a(n) 73 year old male who presents today as a return patient for skin check  Last seen 8/21/23 by me, at which time he was to continue nystatin cream and triamcinolone ointment for his intertrigo, which was well controlled at the last visit.    Today, he presents with spot on left pointer finger, that fills up with fluid and then it goes away. He states that the spot is not bothersome.     Patient is otherwise feeling well, without additional skin concerns.    Labs Reviewed:  N/A    Physical Exam:  Vitals: There were no vitals taken for this visit.  SKIN: Full skin, which includes the head/face, both arms, chest, back, abdomen,both legs, genitalia and/or groin buttocks, digits and/or nails, was examined.  -There are dome shaped bright red papules on the head/neck, trunk, extremities.   - Multiple regular brown pigmented macules and papules are identified on the head/neck, trunk, extremities.   - Scattered brown macules on sun exposed areas.  - There are waxy stuck on tan to brown papules on the head/neck, trunk, extremities.   - gritty pink papule left dorsal nose  - Left index finger: hemorrhagic crust noted the at border with linear nail dystrophy noted distally.   - No other lesions of concern on areas examined.     Medications:  Current Outpatient Medications   Medication Sig Dispense Refill    amLODIPine (NORVASC) 5 MG tablet Take 2 tablets (10 mg) by mouth daily 180 tablet 1    FAMOTIDINE 20 MG PO tablet TAKE TWO TABLETS BY MOUTH ONCE DAILY 60 tablet 0    metoprolol succinate ER (TOPROL XL) 100 MG 24 hr tablet TAKE 1 TABLET (100 MG) BY MOUTH DAILY 90 tablet 3    Multiple Vitamins-Minerals (CENTRUM SILVER ULTRA MENS PO)       nystatin (MYCOSTATIN) 120492 UNIT/GM external cream Apply daily to the groin. Increase to twice daily  when rashes are present and self mix with triamcinolone. 60 g 11    omeprazole (PRILOSEC) 40 MG DR capsule TAKE ONE CAPSULE BY MOUTH ONCE DAILY 90 capsule 1    sildenafil (REVATIO) 20 MG tablet Take 2-3 as needed for ED 20 tablet 0    triamcinolone (KENALOG) 0.025 % external ointment Apply topically 2 times daily To groin rash until resolved. 80 g 3    triamcinolone (KENALOG) 0.1 % external ointment APPLY TOPICALLY TWICE DAILY TO AFFECTED AREAS OF THE RASH IN THE GROIN UP TO ONE WEEK 30 g 0    vitamin D3 (CHOLECALCIFEROL) 1000 units (25 mcg) tablet Take by mouth daily       No current facility-administered medications for this visit.      Past Medical History:   Patient Active Problem List   Diagnosis    CARDIOVASCULAR SCREENING; LDL GOAL LESS THAN 130    Hypertension goal BP (blood pressure) < 140/90    GERD (gastroesophageal reflux disease)    Seasonal allergic rhinitis    Skin exam, screening for cancer    Multiple benign nevi    Seborrheic keratoses    Skin cancer screening    Morbid obesity (H)     Past Medical History:   Diagnosis Date    Acid reflux     Allergies     HTN (hypertension) 1990    Squamous cell carcinoma     Varicose veins         CC Referred Self, MD  No address on file on close of this encounter.

## 2024-04-15 NOTE — NURSING NOTE
Dermatology Rooming Note    Juan Jose Razo's goals for this visit include:   Chief Complaint   Patient presents with    Skin Check     6 month FBSE.  Spot of concern on the left pointer finger, possible cyst      Briseyda Avery, CMA

## 2024-04-15 NOTE — LETTER
4/15/2024       RE: Juan Jose Razo  [address] [id number]     Dear Colleague,    Thank you for referring your patient, Juan Jose Razo, to the [facility] DERMATOLOGY CLINIC [place] [facility]. Please see a copy of my visit note below.    [facility] Dermatology Note  Encounter Date: Apr 15, 2024  Office Visit       Dermatology Problem List:  1. 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 History of dysplastic nevus (per patient)  3. Onychomycosis  4. Aks-left dorsal nose  - s/p cryotherapy  5. 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 Intertrigo, groin  - Current tx:  triamcinolone 0.025% ointment, nystatin cream  Past tx:  - hydrocortisone 2.5% ointment,  7. 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/2010  - SK, R frontal scalp s/p shave biopsy 6/3/2014  - Intradermal melanocytic nevus (one tissue fragment) and multiple   fibroepithelial polyps , bilateral axillae s/p shave biopsy 12/29/2016  - Pilar cyst, L occipital scalp s/p shave biopsy 2/9/2017  Verrucous vulgaris, right lateral neck s/p biopsy 3/12/2018  - SK, Left parietal scalp, 9/12/2018  - Acrochordon, L upper posterior thigh, s/p bx 2/14/2022  - Macular SK and fibrosis, L top shoulder s/p bx 2/14/2022  - Digital mucoid cyst: left index finger  ____________________________________________    Assessment & Plan:    #Hx of  Actinic keratosis, left dorsal nose x 1   -s/p cryotherapy.     # Intertrigo, groin, stable well controlled, chronic.    - Continue nystatin cream daily PRN for flares to the groin. (Refilled today)  - Continue triamcinolone 0.025% ointment daily PRN for flares to the groin. (Refilled today)        # Cherry angiomas  # Seborrheic keratoses  Discussed the natural history and benign  nature of this lesion. Reassurance provided that no additional treatment is necessary.      # Solar lentigines  # Multiple benign nevi  - No concerning lesions today  - Monitor for ABCDEs of melanoma   - Continue sun protection - recommend SPF 30 or higher with frequent application   - Return sooner if noticing changing or symptomatic lesions      # History of NMSC. No evidence of recurrent disease.  # History of dysplastic nevus.  - Continue photoprotection - recommend SPF 30 or higher with frequent reapplication  - Continue yearly skin exams  - Advised to monitor for changing, non-healing, bleeding, painful, changing, or otherwise symptomatic lesions  - Future considerations: niacinamide 500 mg BID    # Actinic keratosis x 1,   - Cryotherapy performed today (see procedure note(s) below).       # Digital mucoid cyst, left index finger (periungual)  - Monitor: Patient to continue monitoring at home and will contact the clinic for any changes.  Defers excision at that time.         Procedures Performed:   - Cryotherapy procedure note, location(s): left dorsal nose. After verbal consent and discussion of risks and benefits including, but not limited to, dyspigmentation/scar, blister, and pain, 1 lesion(s) was(were) treated with 1-2 mm freeze border for 1-2 cycles with liquid nitrogen. Post cryotherapy instructions were provided.      Follow-up: 6 month(s) in-person, or earlier for new or changing lesions    Staff and Scribe:     Scribe Disclosure:   I, Roma Lerner, am serving as a scribe; to document services personally performed by Imelda Rincon PA-C -based on data collection and the provider's statements to me.     Provider Disclosure:  I agree with above History, Review of Systems, Physical exam and Plan.  I have reviewed the content of the documentation and have edited it as needed. I have personally performed the services documented here and the documentation accurately represents those services and  the decisions I have made.      Electronically signed by:  All risks, benefits and alternatives were discussed with patient.  Patient is in agreement and understands the assessment and plan.  All questions were answered.  Sun Screen Education was given.   Return to Clinic in 6 months or sooner as needed.   Imelda Rincon PA-C         ____________________________________________    CC: Skin Check (6 month FBSE.  Spot of concern on the left pointer finger, possible cyst )    HPI:  Mr. Juan Jose Razo is a(n) 73 year old male who presents today as a return patient for skin check  Last seen 8/21/23 by me, at which time he was to continue nystatin cream and triamcinolone ointment for his intertrigo, which was well controlled at the last visit.    Today, he presents with spot on left pointer finger, that fills up with fluid and then it goes away. He states that the spot is not bothersome.     Patient is otherwise feeling well, without additional skin concerns.    Labs Reviewed:  N/A    Physical Exam:  Vitals: There were no vitals taken for this visit.  SKIN: Full skin, which includes the head/face, both arms, chest, back, abdomen,both legs, genitalia and/or groin buttocks, digits and/or nails, was examined.  -There are dome shaped bright red papules on the head/neck, trunk, extremities.   - Multiple regular brown pigmented macules and papules are identified on the head/neck, trunk, extremities.   - Scattered brown macules on sun exposed areas.  - There are waxy stuck on tan to brown papules on the head/neck, trunk, extremities.   - gritty pink papule left dorsal nose  - Left index finger: hemorrhagic crust noted the at border with linear nail dystrophy noted distally.   - No other lesions of concern on areas examined.     Medications:  Current Outpatient Medications   Medication Sig Dispense Refill    amLODIPine (NORVASC) 5 MG tablet Take 2 tablets (10 mg) by mouth daily 180 tablet 1    FAMOTIDINE 20 MG PO tablet TAKE  TWO TABLETS BY MOUTH ONCE DAILY 60 tablet 0    metoprolol succinate ER (TOPROL XL) 100 MG 24 hr tablet TAKE 1 TABLET (100 MG) BY MOUTH DAILY 90 tablet 3    Multiple Vitamins-Minerals (CENTRUM SILVER ULTRA MENS PO)       nystatin (MYCOSTATIN) 309978 UNIT/GM external cream Apply daily to the groin. Increase to twice daily when rashes are present and self mix with triamcinolone. 60 g 11    omeprazole (PRILOSEC) 40 MG DR capsule TAKE ONE CAPSULE BY MOUTH ONCE DAILY 90 capsule 1    sildenafil (REVATIO) 20 MG tablet Take 2-3 as needed for ED 20 tablet 0    triamcinolone (KENALOG) 0.025 % external ointment Apply topically 2 times daily To groin rash until resolved. 80 g 3    triamcinolone (KENALOG) 0.1 % external ointment APPLY TOPICALLY TWICE DAILY TO AFFECTED AREAS OF THE RASH IN THE GROIN UP TO ONE WEEK 30 g 0    vitamin D3 (CHOLECALCIFEROL) 1000 units (25 mcg) tablet Take by mouth daily       No current facility-administered medications for this visit.      Past Medical History:   Patient Active Problem List   Diagnosis    CARDIOVASCULAR SCREENING; LDL GOAL LESS THAN 130    Hypertension goal BP (blood pressure) < 140/90    GERD (gastroesophageal reflux disease)    Seasonal allergic rhinitis    Skin exam, screening for cancer    Multiple benign nevi    Seborrheic keratoses    Skin cancer screening    Morbid obesity (H)     Past Medical History:   Diagnosis Date    Acid reflux     Allergies     HTN (hypertension) 1990    Squamous cell carcinoma     Varicose veins         CC Referred Self, MD  No address on file on close of this encounter.

## 2024-04-15 NOTE — PATIENT INSTRUCTIONS
Checking for Skin Cancer  You can help find cancer early by checking your skin each month. There are 3 main kinds of skin cancer: melanoma, basal cell carcinoma, and squamous cell carcinoma. Doing monthly skin checks is the best way to find new marks, sores, or skin changes. Follow these instructions for checking your skin.   The ABCDEs of checking moles for melanoma   Check your moles or growths for signs of melanoma using ABCDE:   Asymmetry: The sides of the mole or growth don t match.  Border: The edges are ragged, notched, or blurred.  Color: The color within the mole or growth varies. It could be black, brown, tan, white, or shades of red, gray, or blue.  Diameter: The mole or growth is larger than   inch or 6 mm (size of a pencil eraser).  Evolving: The size, shape, texture, or color of the mole or growth is changing.     ABCDE's of moles on light skin.        ABCDE's of moles on dark skin may be harder to identify.     Checking for other types of skin cancer  Basal cell carcinoma or squamous cell carcinoma cause symptoms like:     A spot or mole that looks different from all other marks on your skin  Changes in how an area feels, such as itching, tenderness, or pain  Changes in the skin's surface, such as oozing, bleeding, or scaliness  A sore that doesn't heal  New swelling, redness, or spread of color beyond the border of a mole    Who s at risk?  Anyone of any skin color can get skin cancer. But you're at greater risk if you have:   Fair skin that freckles easily and burns instead of tanning  Light-colored or red hair  Light-colored eyes  Many moles or abnormal moles on your skin  A long history of unprotected exposure to sunlight or tanning beds  A history of many blistering sunburns as a child or teen  A family history of skin cancer  Been exposed to radiation or chemicals  A weakened immune system  Been exposed to arsenic  If you've had skin cancer in the past, you're at high risk of having it again.    How to check your skin  Do your monthly skin checkups in front of a full-length mirror. Use a room with good lighting so it's easier to see. Use a hand mirror to look at hard-to-see places like your buttocks and back. You can also have a trusted friend or family member help you with these checks. Check every part of your body, including your:   Head (ears, face, neck, and scalp)  Torso (front, back, sides, and under breasts)  Arms (tops, undersides, and armpits)  Hands (palms, backs, and fingers, including under the nails)  Lower back, buttocks, and genitals  Legs (front, back, and sides)  Feet (tops, soles, toes, including under the nails, and between toes)  Watch for new spots on your skin or a spot that's changing in color, shape, size.   If you have a lot of moles, take digital photos of them each month. Make sure to take photos both up close and from a distance. These can help you see if any moles change over time.   Know your skin  Most skin changes aren't cancer. But if you see any changes in your skin, call your healthcare provider right away. Only they can tell you if a change is a problem. If you have skin cancer, seeing your provider can be the first step to getting the treatment that could save your life.   Debra last reviewed this educational content on 10/1/2021    2931-7629 [employer]. All rights reserved. This information is not intended as a substitute for professional medical care. Always follow your healthcare professional's instructions.     Cryotherapy    What is it?  Use of a very cold liquid, such as liquid nitrogen, to freeze and destroy abnormal skin cells that need to be removed    What should I expect?  Tenderness and redness  A small blister that might grow and fill with dark purple blood. There may be crusting.  More than one treatment may be needed if the lesions do not go away.    How do I care for the treated area?  Gently wash the area with your hands when  bathing.  Use a thin layer of Vaseline to help with healing. You may use a Band-Aid.   The area should heal within 7-10 days and may leave behind a pink or lighter color.   Do not use an antibiotic or Neosporin ointment.   You may take acetaminophen (Tylenol) for pain.     Call your doctor if you have:  Severe pain  Signs of infection (warmth, redness, cloudy yellow drainage, and or a bad smell)  Questions or concerns    Who should I call with questions?      [facility]: [phone]      [facility]: [phone]      For urgent needs outside of business hours call the [facility] at [phone] and ask for the dermatology resident on call

## 2024-04-25 DIAGNOSIS — I10 HYPERTENSION GOAL BP (BLOOD PRESSURE) < 140/90: ICD-10-CM

## 2024-04-25 RX ORDER — AMLODIPINE BESYLATE 5 MG/1
TABLET ORAL
Qty: 180 TABLET | Refills: 1 | Status: SHIPPED | OUTPATIENT
Start: 2024-04-25

## 2024-07-31 DIAGNOSIS — K21.9 GERD (GASTROESOPHAGEAL REFLUX DISEASE): ICD-10-CM

## 2024-07-31 RX ORDER — OMEPRAZOLE 40 MG/1
CAPSULE, DELAYED RELEASE ORAL
Qty: 90 CAPSULE | Refills: 1 | OUTPATIENT
Start: 2024-07-31

## 2024-08-06 DIAGNOSIS — K21.9 GERD (GASTROESOPHAGEAL REFLUX DISEASE): ICD-10-CM

## 2024-08-06 RX ORDER — OMEPRAZOLE 40 MG/1
40 CAPSULE, DELAYED RELEASE ORAL DAILY
Qty: 90 CAPSULE | Refills: 0 | Status: SHIPPED | OUTPATIENT
Start: 2024-08-06

## 2024-10-21 NOTE — PROGRESS NOTES
[facility] Dermatology Note  Encounter Date: Oct 22, 2024  Office Visit     Dermatology Problem List:  Last skin check 10/22/24  0 . NUB, L preauricular cheek, s/p bx 10/22/24  1. 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 History of dysplastic nevus (per patient)  3. Onychomycosis  4. Aks-left dorsal nose  - s/p cryotherapy  5. 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 Intertrigo, groin  - Current tx:  triamcinolone 0.025% ointment, nystatin cream  Past tx:  - hydrocortisone 2.5% ointment,  7. 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  # History of Dysplastic Nevi. No clinical evidence recurrence.  -No signs of recurrence  - Continue regular skin examinations  - Sun precaution was advised including the use of sun screens of SPF 30 or higher, sun protective clothing, and avoidance of tanning beds.    # Skin cancer screening with multiple benign nevi, solar lentigines    - ABCDEs: Counseled ABCDEs of melanoma: Asymmetry, Border (irregularity), Color (not uniform, changes in color), Diameter (greater than 6 mm which is about the size of a pencil eraser), and Evolving (any changes in preexisting moles).  - Sun protection: Counseled SPF30+ sunscreen, UPF  clothing, sun avoidance, tanning bed avoidance.    # Cherry angiomas and seborrheic keratoses,  Benign, reassurance given.     # Neoplasm of unspecified behavior of the skin (D49.2) on the L preauricular cheek. The differential diagnosis includes ISK vs other. .   - brown verrucous plaque  - Shave biopsy performed today. See procedure section.    # Intertrigo, groin, stable well controlled, chronic.  No active scaling or erythema noted today.   - Continue nystatin cream daily PRN for flares to the groin.  - Use triamcinolone 0.025% ointment sparingly flares to the groin.   Discussed triamcinolone 0.1% ointment should not be used on a regular basis in the groin as it can cause skin atrophy, but in appropriate circumstances, it can be used for flares.     # Digital mucoid cyst, left index finger (periungual) Not active today and did not discuss.   - Monitor: Patient to continue monitoring at home and will contact the clinic for any changes.        Procedures Performed:   - Shave biopsy procedure note, location(s): L preauricular cheek. After discussion of benefits and risks including but not limited to bleeding, infection, scar, incomplete removal, recurrence, and non-diagnostic biopsy, verbal consent and photographs were obtained. The area was cleaned with isopropyl alcohol. 0.5mL of 1% lidocaine with epinephrine was injected to obtain adequate anesthesia of lesion(s). Shave biopsy at site(s) performed. Hemostasis was achieved with aluminium chloride. Petrolatum ointment and a sterile dressing were applied. The patient tolerated the procedure and no complications were noted. The patient was provided with verbal and written post care instructions.       Follow-up: 6 month(s) in-person, or earlier for new or changing lesions    Staff and Scribe:     Scribe Disclosure:   Inna SHAH, am serving as a scribe to document services personally performed by Imelda Rincon PA-C based on data collection and the  provider's statements to me.     Provider Disclosure:   The documentation recorded by the scribe accurately reflects the services I personally performed and the decisions made by me.    All risks, benefits and alternatives were discussed with patient.  Patient is in agreement and understands the assessment and plan.  All questions were answered.  Sun Screen Education was given.   Return to Clinic in 6 months or sooner as needed.   Imelda Rincon PA-C   [facility]    ____________________________________________    CC: Skin Check (FBSC/Concerns: Left Cheek Lesion/)    HPI:  Mr. Juan Jose Razo is a(n) 73 year old male who presents today as a return patient for skin check.    Last seen in dermatology 4/15/24 by me for FBSE. No evidence of NMSC or DN recurrence. Patient deferred excision for digital mucoid cyst on the L index finger and will continue to monitor for changes. Chronic intertrigo of groin stable and continued topicals as needed. One AK on L dorsal nose treated with cryotherapy. Otherwise benign exam.    Today, patient reports a spot of concern on the L cheek that is growing and catching on his razor. He reports rash and itching in groin comes and goes. Uses the nystatin some and the triamcinolone sparingly when it flares.       Patient is otherwise feeling well, without additional skin concerns.    Labs Reviewed:  N/A    Physical Exam:  Vitals: There were no vitals taken for this visit.  SKIN: Full skin, which includes the head/face, both arms, chest, back, abdomen,both legs, genitalia and/or groin buttocks, digits and/or nails, was examined.  - No active erythema or scaling in the inguinal folds.  - There is a brown verrucous plaque on the L preauricular cheek..   - There is a dome shaped bright red papule on the trunk and extremities.   - Multiple regular brown pigmented macules and papules are identified on the trunk and extremities.   - Scattered brown macules on sun  exposed areas.  - There are waxy stuck on tan to brown papules on the trunk and extremities..   - No other lesions of concern on areas examined.     Medications:  Current Outpatient Medications   Medication Sig Dispense Refill    nystatin (MYCOSTATIN) 247882 UNIT/GM external cream Apply daily to the groin. Increase to twice daily when rashes are present and self mix with triamcinolone. 60 g 11    triamcinolone (KENALOG) 0.025 % external ointment Apply topically 2 times daily To groin rash until resolved. 60 g 5    triamcinolone (KENALOG) 0.1 % external ointment APPLY TOPICALLY TWICE DAILY TO AFFECTED AREAS OF THE RASH IN THE GROIN UP TO ONE WEEK 30 g 0    amLODIPine (NORVASC) 5 MG tablet TAKE TWO TABLETS (10 MG) BY MOUTH ONCE DAILY 180 tablet 1    FAMOTIDINE 20 MG PO tablet TAKE TWO TABLETS BY MOUTH ONCE DAILY 60 tablet 0    metoprolol succinate ER (TOPROL XL) 100 MG 24 hr tablet TAKE 1 TABLET (100 MG) BY MOUTH DAILY 90 tablet 3    Multiple Vitamins-Minerals (CENTRUM SILVER ULTRA MENS PO)       omeprazole (PRILOSEC) 40 MG DR capsule Take 1 capsule (40 mg) by mouth daily 90 capsule 0    sildenafil (REVATIO) 20 MG tablet Take 2-3 as needed for ED 20 tablet 0    vitamin D3 (CHOLECALCIFEROL) 1000 units (25 mcg) tablet Take by mouth daily       No current facility-administered medications for this visit.      Past Medical History:   Patient Active Problem List   Diagnosis    CARDIOVASCULAR SCREENING; LDL GOAL LESS THAN 130    Hypertension goal BP (blood pressure) < 140/90    GERD (gastroesophageal reflux disease)    Seasonal allergic rhinitis    Skin exam, screening for cancer    Multiple benign nevi    Seborrheic keratoses    Skin cancer screening    Morbid obesity (H)     Past Medical History:   Diagnosis Date    Acid reflux     Allergies     HTN (hypertension) 1990    Squamous cell carcinoma     Varicose veins         CC Imelda Rincon PA-C  [address] [place] [address] on close of this  encounter.

## 2024-10-21 NOTE — PATIENT INSTRUCTIONS
Wound Care After a Biopsy    What is a skin biopsy?  A skin biopsy allows the doctor to examine a very small piece of tissue under the microscope to determine the diagnosis and the best treatment for the skin condition. A local anesthetic (numbing medicine) is injected with a very small needle into the skin area to be tested. A small piece of skin is taken from the area. Sometimes a suture (stitch) is used.     What are the risks of a skin biopsy?  I will experience scar, bleeding, swelling, pain, crusting and redness. I may experience incomplete removal or recurrence. Risks of this procedure are excessive bleeding, bruising, infection, nerve damage, numbness, thick (hypertrophic or keloidal) scar and non-diagnostic biopsy.    How should I care for my wound for the first 24 hours?  Keep the wound dry and covered for 24 hours  If it bleeds, hold direct pressure on the area for 15 minutes. If bleeding does not stop, call us or go to the emergency room  Avoid strenuous exercise the first 1-2 days or as your doctor instructs you    How should I care for the wound after 24 hours?  After 24 hours, remove the bandage  You may bathe or shower as normal  If you had a scalp biopsy, you can shampoo as usual and can use shower water to clean the biopsy site daily  Clean the wound once a day with gentle soap and water  Do not scrub, be gentle  Apply white petroleum/Vaseline after cleaning the wound with a cotton swab or a clean finger, and keep the site covered with a Bandaid /bandage. Bandages are not necessary with a scalp biopsy  If you are unable to cover the site with a Bandaid /bandage, re-apply ointment 2-3 times a day to keep the site moist. Moisture will help with healing  Avoid strenuous activity for first 1-2 days  Avoid lakes, rivers, pools, and oceans until the stitches are removed or the site is healed    How do I clean my wound?  Wash hands thoroughly with soap or use hand  before all wound  care  Clean the wound with gentle soap and water  Apply white petroleum/Vaseline  to wound after it is clean  Replace the Bandaid /bandage to keep the wound covered for the first few days or as instructed by your doctor  If you had a scalp biopsy, warm shower water to the area on a daily basis should suffice    What should I use to clean my wound?   Cotton-tipped applicators (Qtips )  White petroleum jelly (Vaseline ). Use a clean new container and use Q-tips to apply.  Bandaids  as needed  Gentle soap     How should I care for my wound long term?  Do not get your wound dirty  Keep up with wound care for one week or until the area is healed.  A small scab will form and fall off by itself when the area is completely healed. The area will be red and will become pink in color as it heals. Sun protection is very important for how your scar will turn out. Sunscreen with an SPF 30 or greater is recommended once the area is healed.  You should have some soreness but it should be mild and slowly go away over several days. Talk to your doctor about using tylenol for pain,    When should I call my doctor?  If you have increased:   Pain or swelling  Pus or drainage (clear or slightly yellow drainage is ok)  Temperature over 100F  Spreading redness or warmth around wound    When will I hear about my results?  The biopsy results can take 2 weeks to come back.  Your results will automatically release to [employer] before your provider has even reviewed them.  The clinic will call you with the results, send you a PeopleAdmin message, or have you schedule a follow-up clinic or phone time to discuss the results.  Contact our clinics if you do not hear from us in 2 weeks.    Who should I call with questions?  [facility]: [phone]  [facility]: [phone]  For urgent needs outside of business hours call the [facility] at [phone] and ask for the dermatology  resident on call   Checking for Skin Cancer  You can help find cancer early by checking your skin each month. There are 3 main kinds of skin cancer: melanoma, basal cell carcinoma, and squamous cell carcinoma. Doing monthly skin checks is the best way to find new marks, sores, or skin changes. Follow these instructions for checking your skin.   The ABCDEs of checking moles for melanoma   Check your moles or growths for signs of melanoma using ABCDE:   Asymmetry: The sides of the mole or growth don t match.  Border: The edges are ragged, notched, or blurred.  Color: The color within the mole or growth varies. It could be black, brown, tan, white, or shades of red, gray, or blue.  Diameter: The mole or growth is larger than   inch or 6 mm (size of a pencil eraser).  Evolving: The size, shape, texture, or color of the mole or growth is changing.     ABCDE's of moles on light skin.        ABCDE's of moles on dark skin may be harder to identify.     Checking for other types of skin cancer  Basal cell carcinoma or squamous cell carcinoma cause symptoms like:     A spot or mole that looks different from all other marks on your skin  Changes in how an area feels, such as itching, tenderness, or pain  Changes in the skin's surface, such as oozing, bleeding, or scaliness  A sore that doesn't heal  New swelling, redness, or spread of color beyond the border of a mole    Who s at risk?  Anyone of any skin color can get skin cancer. But you're at greater risk if you have:   Fair skin that freckles easily and burns instead of tanning  Light-colored or red hair  Light-colored eyes  Many moles or abnormal moles on your skin  A long history of unprotected exposure to sunlight or tanning beds  A history of many blistering sunburns as a child or teen  A family history of skin cancer  Been exposed to radiation or chemicals  A weakened immune system  Been exposed to arsenic  If you've had skin cancer in the past, you're at high risk of  having it again.   How to check your skin  Do your monthly skin checkups in front of a full-length mirror. Use a room with good lighting so it's easier to see. Use a hand mirror to look at hard-to-see places like your buttocks and back. You can also have a trusted friend or family member help you with these checks. Check every part of your body, including your:   Head (ears, face, neck, and scalp)  Torso (front, back, sides, and under breasts)  Arms (tops, undersides, and armpits)  Hands (palms, backs, and fingers, including under the nails)  Lower back, buttocks, and genitals  Legs (front, back, and sides)  Feet (tops, soles, toes, including under the nails, and between toes)  Watch for new spots on your skin or a spot that's changing in color, shape, size.   If you have a lot of moles, take digital photos of them each month. Make sure to take photos both up close and from a distance. These can help you see if any moles change over time.   Know your skin  Most skin changes aren't cancer. But if you see any changes in your skin, call your healthcare provider right away. Only they can tell you if a change is a problem. If you have skin cancer, seeing your provider can be the first step to getting the treatment that could save your life.   Debra last reviewed this educational content on 10/1/2021    3862-0529 [employer]. All rights reserved. This information is not intended as a substitute for professional medical care. Always follow your healthcare professional's instructions.

## 2024-10-22 ENCOUNTER — OFFICE VISIT (OUTPATIENT)
Dept: DERMATOLOGY | Facility: CLINIC | Age: 74
End: 2024-10-22
Attending: PHYSICIAN ASSISTANT
Payer: COMMERCIAL

## 2024-10-22 DIAGNOSIS — Z85.89 HISTORY OF SQUAMOUS CELL CARCINOMA: ICD-10-CM

## 2024-10-22 DIAGNOSIS — I10 HYPERTENSION GOAL BP (BLOOD PRESSURE) < 140/90: ICD-10-CM

## 2024-10-22 DIAGNOSIS — Z12.83 SKIN CANCER SCREENING: ICD-10-CM

## 2024-10-22 DIAGNOSIS — D49.2 NEOPLASM OF UNSPECIFIED BEHAVIOR OF BONE, SOFT TISSUE, AND SKIN: Primary | ICD-10-CM

## 2024-10-22 PROCEDURE — 11102 TANGNTL BX SKIN SINGLE LES: CPT | Performed by: PHYSICIAN ASSISTANT

## 2024-10-22 PROCEDURE — 99213 OFFICE O/P EST LOW 20 MIN: CPT | Mod: 25 | Performed by: PHYSICIAN ASSISTANT

## 2024-10-22 PROCEDURE — 88305 TISSUE EXAM BY PATHOLOGIST: CPT | Mod: 26 | Performed by: DERMATOLOGY

## 2024-10-22 RX ORDER — AMLODIPINE BESYLATE 5 MG/1
TABLET ORAL
Qty: 180 TABLET | Refills: 1 | Status: SHIPPED | OUTPATIENT
Start: 2024-10-22

## 2024-10-22 NOTE — LETTER
10/22/2024      Juan Jose Razo  [address] [facility] [address]      Dear Colleague,    Thank you for referring your patient, Juan Jose Razo, to the [facility]. Please see a copy of my visit note below.      [facility] Dermatology Note  Encounter Date: Oct 22, 2024  Office Visit     Dermatology Problem List:  Last skin check 10/22/24  0 . NUB, L preauricular cheek, s/p bx 10/22/24  1. 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 History of dysplastic nevus (per patient)  3. Onychomycosis  4. Aks-left dorsal nose  - s/p cryotherapy  5. 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 Intertrigo, groin  - Current tx:  triamcinolone 0.025% ointment, nystatin cream  Past tx:  - hydrocortisone 2.5% ointment,  7. 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  # History of Dysplastic Nevi. No clinical evidence recurrence.  -No signs of recurrence  - Continue regular skin examinations  - Sun precaution was advised including the use of sun screens of SPF 30 or higher, sun protective clothing, and avoidance of tanning beds.    # Skin cancer screening with multiple benign nevi, solar lentigines    - ABCDEs: Counseled ABCDEs of melanoma:  Asymmetry, Border (irregularity), Color (not uniform, changes in color), Diameter (greater than 6 mm which is about the size of a pencil eraser), and Evolving (any changes in preexisting moles).  - Sun protection: Counseled SPF30+ sunscreen, UPF clothing, sun avoidance, tanning bed avoidance.    # Cherry angiomas and seborrheic keratoses,  Benign, reassurance given.     # Neoplasm of unspecified behavior of the skin (D49.2) on the L preauricular cheek. The differential diagnosis includes ISK vs other. .   - brown verrucous plaque  - Shave biopsy performed today. See procedure section.    # Intertrigo, groin, stable well controlled, chronic.  No active scaling or erythema noted today.   - Continue nystatin cream daily PRN for flares to the groin.  - Use triamcinolone 0.025% ointment sparingly flares to the groin.   Discussed triamcinolone 0.1% ointment should not be used on a regular basis in the groin as it can cause skin atrophy, but in appropriate circumstances, it can be used for flares.     # Digital mucoid cyst, left index finger (periungual) Not active today and did not discuss.   - Monitor: Patient to continue monitoring at home and will contact the clinic for any changes.        Procedures Performed:   - Shave biopsy procedure note, location(s): L preauricular cheek. After discussion of benefits and risks including but not limited to bleeding, infection, scar, incomplete removal, recurrence, and non-diagnostic biopsy, verbal consent and photographs were obtained. The area was cleaned with isopropyl alcohol. 0.5mL of 1% lidocaine with epinephrine was injected to obtain adequate anesthesia of lesion(s). Shave biopsy at site(s) performed. Hemostasis was achieved with aluminium chloride. Petrolatum ointment and a sterile dressing were applied. The patient tolerated the procedure and no complications were noted. The patient was provided with verbal and written post care instructions.       Follow-up: 6 month(s)  in-person, or earlier for new or changing lesions    Staff and Scribe:     Scribe Disclosure:   I, Inna Fishman, am serving as a scribe to document services personally performed by Imelda Rincon PA-C based on data collection and the provider's statements to me.     Provider Disclosure:   The documentation recorded by the scribe accurately reflects the services I personally performed and the decisions made by me.    All risks, benefits and alternatives were discussed with patient.  Patient is in agreement and understands the assessment and plan.  All questions were answered.  Sun Screen Education was given.   Return to Clinic in 6 months or sooner as needed.   Imelda Rincon PA-C   [facility]    ____________________________________________    CC: Skin Check (FBSC/Concerns: Left Cheek Lesion/)    HPI:  Mr. Juan Jose Razo is a(n) 73 year old male who presents today as a return patient for skin check.    Last seen in dermatology 4/15/24 by me for FBSE. No evidence of NMSC or DN recurrence. Patient deferred excision for digital mucoid cyst on the L index finger and will continue to monitor for changes. Chronic intertrigo of groin stable and continued topicals as needed. One AK on L dorsal nose treated with cryotherapy. Otherwise benign exam.    Today, patient reports a spot of concern on the L cheek that is growing and catching on his razor. He reports rash and itching in groin comes and goes. Uses the nystatin some and the triamcinolone sparingly when it flares.       Patient is otherwise feeling well, without additional skin concerns.    Labs Reviewed:  N/A    Physical Exam:  Vitals: There were no vitals taken for this visit.  SKIN: Full skin, which includes the head/face, both arms, chest, back, abdomen,both legs, genitalia and/or groin buttocks, digits and/or nails, was examined.  - No active erythema or scaling in the inguinal folds.  - There is a brown verrucous plaque  on the L preauricular cheek..   - There is a dome shaped bright red papule on the trunk and extremities.   - Multiple regular brown pigmented macules and papules are identified on the trunk and extremities.   - Scattered brown macules on sun exposed areas.  - There are waxy stuck on tan to brown papules on the trunk and extremities..   - No other lesions of concern on areas examined.     Medications:  Current Outpatient Medications   Medication Sig Dispense Refill     nystatin (MYCOSTATIN) 582481 UNIT/GM external cream Apply daily to the groin. Increase to twice daily when rashes are present and self mix with triamcinolone. 60 g 11     triamcinolone (KENALOG) 0.025 % external ointment Apply topically 2 times daily To groin rash until resolved. 60 g 5     triamcinolone (KENALOG) 0.1 % external ointment APPLY TOPICALLY TWICE DAILY TO AFFECTED AREAS OF THE RASH IN THE GROIN UP TO ONE WEEK 30 g 0     amLODIPine (NORVASC) 5 MG tablet TAKE TWO TABLETS (10 MG) BY MOUTH ONCE DAILY 180 tablet 1     FAMOTIDINE 20 MG PO tablet TAKE TWO TABLETS BY MOUTH ONCE DAILY 60 tablet 0     metoprolol succinate ER (TOPROL XL) 100 MG 24 hr tablet TAKE 1 TABLET (100 MG) BY MOUTH DAILY 90 tablet 3     Multiple Vitamins-Minerals (CENTRUM SILVER ULTRA MENS PO)        omeprazole (PRILOSEC) 40 MG DR capsule Take 1 capsule (40 mg) by mouth daily 90 capsule 0     sildenafil (REVATIO) 20 MG tablet Take 2-3 as needed for ED 20 tablet 0     vitamin D3 (CHOLECALCIFEROL) 1000 units (25 mcg) tablet Take by mouth daily       No current facility-administered medications for this visit.      Past Medical History:   Patient Active Problem List   Diagnosis     CARDIOVASCULAR SCREENING; LDL GOAL LESS THAN 130     Hypertension goal BP (blood pressure) < 140/90     GERD (gastroesophageal reflux disease)     Seasonal allergic rhinitis     Skin exam, screening for cancer     Multiple benign nevi     Seborrheic keratoses     Skin cancer screening     Morbid  obesity (H)     Past Medical History:   Diagnosis Date     Acid reflux      Allergies      HTN (hypertension) 1990     Squamous cell carcinoma      Varicose veins         CC Imelda Rincon PA-C  [address] [place], [place] [id number] on close of this encounter.      Again, thank you for allowing me to participate in the care of your patient.        Sincerely,        Imelda Rincon PA-C

## 2024-11-01 DIAGNOSIS — K21.9 GERD (GASTROESOPHAGEAL REFLUX DISEASE): ICD-10-CM

## 2024-11-01 RX ORDER — OMEPRAZOLE 40 MG/1
40 CAPSULE, DELAYED RELEASE ORAL DAILY
Qty: 90 CAPSULE | Refills: 0 | Status: SHIPPED | OUTPATIENT
Start: 2024-11-01

## 2024-11-30 SDOH — HEALTH STABILITY: PHYSICAL HEALTH: ON AVERAGE, HOW MANY DAYS PER WEEK DO YOU ENGAGE IN MODERATE TO STRENUOUS EXERCISE (LIKE A BRISK WALK)?: 0 DAYS

## 2024-11-30 SDOH — HEALTH STABILITY: PHYSICAL HEALTH: ON AVERAGE, HOW MANY MINUTES DO YOU ENGAGE IN EXERCISE AT THIS LEVEL?: 0 MIN

## 2024-11-30 ASSESSMENT — SOCIAL DETERMINANTS OF HEALTH (SDOH): HOW OFTEN DO YOU GET TOGETHER WITH FRIENDS OR RELATIVES?: PATIENT DECLINED

## 2024-12-02 ENCOUNTER — OFFICE VISIT (OUTPATIENT)
Dept: FAMILY MEDICINE | Facility: CLINIC | Age: 74
End: 2024-12-02
Payer: COMMERCIAL

## 2024-12-02 ENCOUNTER — HOSPITAL ENCOUNTER (OUTPATIENT)
Dept: GENERAL RADIOLOGY | Facility: CLINIC | Age: 74
Discharge: HOME | End: 2024-12-02
Attending: FAMILY MEDICINE | Admitting: FAMILY MEDICINE
Payer: COMMERCIAL

## 2024-12-02 VITALS
TEMPERATURE: 97.8 F | DIASTOLIC BLOOD PRESSURE: 82 MMHG | WEIGHT: 307 LBS | BODY MASS INDEX: 38.17 KG/M2 | OXYGEN SATURATION: 97 % | RESPIRATION RATE: 14 BRPM | HEART RATE: 80 BPM | HEIGHT: 75 IN | SYSTOLIC BLOOD PRESSURE: 139 MMHG

## 2024-12-02 DIAGNOSIS — M25.512 PAIN RADIATING TO LEFT SHOULDER: ICD-10-CM

## 2024-12-02 DIAGNOSIS — E66.01 MORBID OBESITY (H): ICD-10-CM

## 2024-12-02 DIAGNOSIS — Z00.00 ENCOUNTER FOR MEDICARE ANNUAL WELLNESS EXAM: Primary | ICD-10-CM

## 2024-12-02 DIAGNOSIS — M54.2 NECK PAIN: ICD-10-CM

## 2024-12-02 DIAGNOSIS — Z23 NEED FOR VACCINATION FOR PNEUMOCOCCUS: ICD-10-CM

## 2024-12-02 DIAGNOSIS — Z13.1 SCREENING FOR DIABETES MELLITUS: ICD-10-CM

## 2024-12-02 DIAGNOSIS — Z12.11 SCREEN FOR COLON CANCER: ICD-10-CM

## 2024-12-02 DIAGNOSIS — I10 HYPERTENSION GOAL BP (BLOOD PRESSURE) < 140/90: ICD-10-CM

## 2024-12-02 DIAGNOSIS — R23.3 BRUISES EASILY: ICD-10-CM

## 2024-12-02 DIAGNOSIS — R07.89 CHEST WALL PAIN: ICD-10-CM

## 2024-12-02 LAB
ALBUMIN SERPL BCG-MCNC: 4.4 G/DL (ref 3.5–5.2)
ALP SERPL-CCNC: 73 U/L (ref 40–150)
ALT SERPL W P-5'-P-CCNC: 58 U/L (ref 0–70)
ANION GAP SERPL CALCULATED.3IONS-SCNC: 11 MMOL/L (ref 7–15)
AST SERPL W P-5'-P-CCNC: 65 U/L (ref 0–45)
BILIRUB SERPL-MCNC: 1.3 MG/DL
BUN SERPL-MCNC: 11.6 MG/DL (ref 8–23)
CALCIUM SERPL-MCNC: 9.4 MG/DL (ref 8.8–10.4)
CHLORIDE SERPL-SCNC: 101 MMOL/L (ref 98–107)
CHOLEST SERPL-MCNC: 163 MG/DL
CREAT SERPL-MCNC: 1.01 MG/DL (ref 0.67–1.17)
CREAT UR-MCNC: 84.2 MG/DL
EGFRCR SERPLBLD CKD-EPI 2021: 80 ML/MIN/1.73M2
ERYTHROCYTE [DISTWIDTH] IN BLOOD BY AUTOMATED COUNT: 12.8 % (ref 10–15)
EST. AVERAGE GLUCOSE BLD GHB EST-MCNC: 120 MG/DL
FASTING STATUS PATIENT QL REPORTED: YES
FASTING STATUS PATIENT QL REPORTED: YES
GLUCOSE SERPL-MCNC: 120 MG/DL (ref 70–99)
HBA1C MFR BLD: 5.8 % (ref 0–5.6)
HCO3 SERPL-SCNC: 26 MMOL/L (ref 22–29)
HCT VFR BLD AUTO: 44.5 % (ref 40–53)
HDLC SERPL-MCNC: 34 MG/DL
HGB BLD-MCNC: 15.1 G/DL (ref 13.3–17.7)
LDLC SERPL CALC-MCNC: 103 MG/DL
MCH RBC QN AUTO: 31.3 PG (ref 26.5–33)
MCHC RBC AUTO-ENTMCNC: 33.9 G/DL (ref 31.5–36.5)
MCV RBC AUTO: 92 FL (ref 78–100)
MICROALBUMIN UR-MCNC: <12 MG/L
MICROALBUMIN/CREAT UR: NORMAL MG/G{CREAT}
NONHDLC SERPL-MCNC: 129 MG/DL
PLATELET # BLD AUTO: 181 10E3/UL (ref 150–450)
POTASSIUM SERPL-SCNC: 4.2 MMOL/L (ref 3.4–5.3)
PROT SERPL-MCNC: 8.1 G/DL (ref 6.4–8.3)
RBC # BLD AUTO: 4.82 10E6/UL (ref 4.4–5.9)
SODIUM SERPL-SCNC: 138 MMOL/L (ref 135–145)
TRIGL SERPL-MCNC: 131 MG/DL
WBC # BLD AUTO: 6.1 10E3/UL (ref 4–11)

## 2024-12-02 PROCEDURE — 83036 HEMOGLOBIN GLYCOSYLATED A1C: CPT | Performed by: FAMILY MEDICINE

## 2024-12-02 PROCEDURE — 99214 OFFICE O/P EST MOD 30 MIN: CPT | Mod: 25 | Performed by: FAMILY MEDICINE

## 2024-12-02 PROCEDURE — 93000 ELECTROCARDIOGRAM COMPLETE: CPT | Performed by: FAMILY MEDICINE

## 2024-12-02 PROCEDURE — 72040 X-RAY EXAM NECK SPINE 2-3 VW: CPT

## 2024-12-02 PROCEDURE — 36415 COLL VENOUS BLD VENIPUNCTURE: CPT | Performed by: FAMILY MEDICINE

## 2024-12-02 PROCEDURE — G0009 ADMIN PNEUMOCOCCAL VACCINE: HCPCS | Performed by: FAMILY MEDICINE

## 2024-12-02 PROCEDURE — 80061 LIPID PANEL: CPT | Performed by: FAMILY MEDICINE

## 2024-12-02 PROCEDURE — 82043 UR ALBUMIN QUANTITATIVE: CPT | Performed by: FAMILY MEDICINE

## 2024-12-02 PROCEDURE — 85027 COMPLETE CBC AUTOMATED: CPT | Performed by: FAMILY MEDICINE

## 2024-12-02 PROCEDURE — G0439 PPPS, SUBSEQ VISIT: HCPCS | Performed by: FAMILY MEDICINE

## 2024-12-02 PROCEDURE — 90677 PCV20 VACCINE IM: CPT | Performed by: FAMILY MEDICINE

## 2024-12-02 PROCEDURE — 82570 ASSAY OF URINE CREATININE: CPT | Performed by: FAMILY MEDICINE

## 2024-12-02 PROCEDURE — 72040 X-RAY EXAM NECK SPINE 2-3 VW: CPT | Mod: 26 | Performed by: RADIOLOGY

## 2024-12-02 PROCEDURE — 80053 COMPREHEN METABOLIC PANEL: CPT | Performed by: FAMILY MEDICINE

## 2024-12-02 ASSESSMENT — PAIN SCALES - GENERAL: PAINLEVEL_OUTOF10: NO PAIN (0)

## 2024-12-02 NOTE — PROGRESS NOTES
"Preventive Care Visit  [facility] PRIMARY CARE  Alphonso Grover MD, Family Medicine  Dec 2, 2024      Assessment & Plan     Encounter for Medicare annual wellness exam  Work on diet/ exercise    Hypertension goal BP (blood pressure) < 140/90  Borderline high   Recheck daily   Follow up by phone in 1 month if not improving.   - EKG 12-lead complete w/read - Clinics  - Lipid panel reflex to direct LDL Fasting; Future  - Albumin Random Urine Quantitative with Creat Ratio; Future  - Comprehensive metabolic panel (BMP + Alb, Alk Phos, ALT, AST, Total. Bili, TP); Future  - Lipid panel reflex to direct LDL Fasting  - Albumin Random Urine Quantitative with Creat Ratio  - Comprehensive metabolic panel (BMP + Alb, Alk Phos, ALT, AST, Total. Bili, TP)  - OFFICE/OUTPT VISIT,EST,LEVL III    Chest wall pain  [place] MS  - EKG 12-lead complete w/read - Clinics  - OFFICE/OUTPT VISIT,EST,LEVL III    Screen for colon cancer  Will do  - Colonoscopy Screening  Referral; Future    Need for vaccination for pneumococcus  done    Screening for diabetes mellitus  done  - Hemoglobin A1c; Future  - Hemoglobin A1c    Morbid obesity (H)  diet    Pain radiating to left shoulder  PHYSICAL THERAPY/ strtch daily  - XR Cervical Spine 2/3 Views; Future  - Physical Therapy  Referral; Future  - OFFICE/OUTPT VISIT,EST,LEVL III    Neck pain  As abobve  - XR Cervical Spine 2/3 Views; Future  - Physical Therapy  Referral; Future  - OFFICE/OUTPT VISIT,EST,LEVL III    Bruises easily    - CBC with platelets; Future  - CBC with platelets  - OFFICE/OUTPT VISIT,EST,LEVL III            BMI  Estimated body mass index is 38.09 kg/m  as calculated from the following:    Height as of this encounter: 1.912 m (6' 3.28\").    Weight as of this encounter: 139.3 kg (307 lb).   Weight management plan: Discussed healthy diet and exercise guidelines    Counseling  Appropriate preventive services were addressed with " this patient via screening, questionnaire, or discussion as appropriate for fall prevention, nutrition, physical activity, Tobacco-use cessation, social engagement, weight loss and cognition.  Checklist reviewing preventive services available has been given to the patient.  Reviewed patient's diet, addressing concerns and/or questions.   Patient reported safety concerns were addressed today.    Work on weight loss  Regular exercise  See Patient Instructions    Subjective   Juan Jose is a 73 year old, presenting for the following:  Wellness Visit        12/2/2024     7:56 AM   Additional Questions   Roomed by Cathy WALDRON  SUBJECTIVE:   \ complains of an injury causing left chest and neck pain a few week weeks ago. The pain is positional with movement of neck without radiation of pain down the arms. Mechanism of injury: none.  Symptoms have been waxing and waning since that time. Prior history of neck problems: no prior neck problems. There is no numbness, tingling, weakness in the arms.        Hyperlipidemia Follow-Up    Are you regularly taking any medication or supplement to lower your cholesterol?   Yes- statin  Are you having muscle aches or other side effects that you think could be caused by your cholesterol lowering medication?  No    Hypertension Follow-up    Do you check your blood pressure regularly outside of the clinic? Yes   Are you following a low salt diet? Yes  Are your blood pressures ever more than 140 on the top number (systolic) OR more   than 90 on the bottom number (diastolic), for example 140/90? No  Encounter Diagnoses   Name Primary?    Encounter for Medicare annual wellness exam Yes    Hypertension goal BP (blood pressure) < 140/90     Chest wall pain     Screen for colon cancer     Need for vaccination for pneumococcus     Screening for diabetes mellitus     Morbid obesity (H)     Pain radiating to left shoulder     Neck pain     Bruises easily, now under great toe        Health Care  Directive  Patient has a Health Care Directive on file  Advance care planning document is on file and is current.      11/30/2024   General Health   How would you rate your overall physical health? Good   Feel stress (tense, anxious, or unable to sleep) Not at all            11/30/2024   Nutrition   Diet: Regular (no restrictions)            11/30/2024   Exercise   Days per week of moderate/strenous exercise 0 days   Average minutes spent exercising at this level 0 min      (!) EXERCISE CONCERN      11/30/2024   Social Factors   Frequency of gathering with friends or relatives Patient declined   Worry food won't last until get money to buy more No   Food not last or not have enough money for food? No   Do you have housing? (Housing is defined as stable permanent housing and does not include staying ouside in a car, in a tent, in an abandoned building, in an overnight shelter, or couch-surfing.) Yes   Are you worried about losing your housing? No   Lack of transportation? No   Unable to get utilities (heat,electricity)? No            11/30/2024   Fall Risk   Fallen 2 or more times in the past year? No     No    Trouble with walking or balance? No     No        Patient-reported    Multiple values from one day are sorted in reverse-chronological order          11/30/2024   Activities of Daily Living- Home Safety   Needs help with the following daily activites None of the above   Safety concerns in the home No grab bars in the bathroom            11/30/2024   Dental   Dentist two times every year? Yes            11/30/2024   Hearing Screening   Hearing concerns? None of the above            11/30/2024   Driving Risk Screening   Patient/family members have concerns about driving No            11/30/2024   General Alertness/Fatigue Screening   Have you been more tired than usual lately? No            11/30/2024   Urinary Incontinence Screening   Bothered by leaking urine in past 6 months No            11/30/2024   TB  Screening   Were you born outside of the [place]? No            Today's PHQ-2 Score:       12/2/2024     7:56 AM   PHQ-2 ( 1999 [employer])   Q1: Little interest or pleasure in doing things 0    Q2: Feeling down, depressed or hopeless 0    PHQ-2 Score 0    Q1: Little interest or pleasure in doing things Not at all   Q2: Feeling down, depressed or hopeless Not at all   PHQ-2 Score 0       Patient-reported           11/30/2024   Substance Use   Alcohol more than 3/day or more than 7/wk No   Do you have a current opioid prescription? No   How severe/bad is pain from 1 to 10? 1/10   Do you use any other substances recreationally? No        Social History     Tobacco Use    Smoking status: Never    Smokeless tobacco: Never   Vaping Use    Vaping status: Never Used   Substance Use Topics    Alcohol use: Yes     Comment: 1-2 beers a week     Drug use: No           11/30/2024   AAA Screening   Family history of Abdominal Aortic Aneurysm (AAA)? No      ASCVD Risk   The 10-year ASCVD risk score (Masood DK, et al., 2019) is: 26.7%    Values used to calculate the score:      Age: 73 years      Sex: Male      Is Non- : No      Diabetic: No      Tobacco smoker: No      Systolic Blood Pressure: 139 mmHg      Is BP treated: Yes      HDL Cholesterol: 33 mg/dL      Total Cholesterol: 156 mg/dL            Reviewed and updated as needed this visit by Provider                    Past Medical History:   Diagnosis Date    Acid reflux     Allergies     HTN (hypertension) 1990    Squamous cell carcinoma     Varicose veins      Current providers sharing in care for this patient include:  Patient Care Team:  Alphonso Grover MD as PCP - General (Family Practice)  Imelda Rincon PA-C as Physician Assistant (Physician Assistant)  Servando Diane MD as MD (Gastroenterology)  Ernesto Blankenship MD as Assigned Sleep Provider  Alphonso Grover MD as Assigned PCP  Ian  "Thea Kent CNP as Nurse Practitioner (Urology)  Sharon Lozoya, RN as Registered Nurse  Imelda Rincon PA-C as Assigned Surgical Provider    The following health maintenance items are reviewed in Epic and correct as of today:  Health Maintenance   Topic Date Due    MEDICARE ANNUAL WELLNESS VISIT  11/30/2024    COLORECTAL CANCER SCREENING  12/06/2024    BMP  02/20/2025    ANNUAL REVIEW OF HM ORDERS  12/02/2025    FALL RISK ASSESSMENT  12/02/2025    GLUCOSE  02/20/2027    DTAP/TDAP/TD IMMUNIZATION (3 - Td or Tdap) 06/26/2027    LIPID  11/30/2028    ADVANCE CARE PLANNING  11/30/2028    HEPATITIS C SCREENING  Completed    PHQ-2 (once per calendar year)  Completed    INFLUENZA VACCINE  Completed    Pneumococcal Vaccine: 65+ Years  Completed    ZOSTER IMMUNIZATION  Completed    RSV VACCINE  Completed    COVID-19 Vaccine  Completed    HPV IMMUNIZATION  Aged Out    MENINGITIS IMMUNIZATION  Aged Out    RSV MONOCLONAL ANTIBODY  Aged Out         Review of Systems  Constitutional, HEENT, cardiovascular, pulmonary, gi and gu systems are negative, except as otherwise noted.     Objective    Exam  /82   Pulse 80   Temp 97.8  F (36.6  C) (Temporal)   Resp 14   Ht 1.912 m (6' 3.28\")   Wt 139.3 kg (307 lb)   SpO2 97%   BMI 38.09 kg/m     Estimated body mass index is 38.09 kg/m  as calculated from the following:    Height as of this encounter: 1.912 m (6' 3.28\").    Weight as of this encounter: 139.3 kg (307 lb).    Physical Exam  GENERAL: alert and no distress  EYES: Eyes grossly normal to inspection, PERRL and conjunctivae and sclerae normal  HENT: ear canals and TM's normal, nose and mouth without ulcers or lesions  NECK: no adenopathy, no asymmetry, masses, or scars  Neck exam: tenderness over lower cervical spine and nuchal area, tenderness over trapezial muscles, normal neurological exam of arms; normal DTR's, motor, sensory exam.  X-Ray: ordered, but results not yet available.  RESP: lungs clear to " auscultation - no rales, rhonchi or wheezes  CV: regular rate and rhythm, normal S1 S2, no S3 or S4, no murmur, click or rub, no peripheral edema  ABDOMEN: soft, nontender, no hepatosplenomegaly, no masses and bowel sounds normal  SKIN: no suspicious lesions or rashes  NEURO: Normal strength and tone, mentation intact and speech normal  BACK: no CVA tenderness, no paralumbar tenderness  PSYCH: mentation appears normal, affect normal/bright  LYMPH: no cervical, supraclavicular, axillary, or inguinal adenopathy  Diabetic foot exam: normal DP and PT pulses, no trophic changes or ulcerative lesions, and normal sensory exam        12/2/2024   Mini Cog   Clock Draw Score 2 Normal   3 Item Recall 3 objects recalled   Mini Cog Total Score 5                 Signed Electronically by: Alphonso Grover MD

## 2024-12-03 ENCOUNTER — PATIENT OUTREACH (OUTPATIENT)
Dept: CARE COORDINATION | Facility: CLINIC | Age: 74
End: 2024-12-03
Payer: COMMERCIAL

## 2024-12-05 ENCOUNTER — THERAPY VISIT (OUTPATIENT)
Dept: PHYSICAL THERAPY | Facility: CLINIC | Age: 74
End: 2024-12-05
Attending: FAMILY MEDICINE
Payer: COMMERCIAL

## 2024-12-05 DIAGNOSIS — M54.2 NECK PAIN: ICD-10-CM

## 2024-12-05 DIAGNOSIS — M25.512 PAIN RADIATING TO LEFT SHOULDER: ICD-10-CM

## 2024-12-05 PROCEDURE — 97161 PT EVAL LOW COMPLEX 20 MIN: CPT | Mod: GP | Performed by: PHYSICAL THERAPIST

## 2024-12-05 PROCEDURE — 97110 THERAPEUTIC EXERCISES: CPT | Mod: GP | Performed by: PHYSICAL THERAPIST

## 2024-12-05 PROCEDURE — 97140 MANUAL THERAPY 1/> REGIONS: CPT | Mod: GP | Performed by: PHYSICAL THERAPIST

## 2024-12-05 NOTE — PROGRESS NOTES
PHYSICAL THERAPY EVALUATION  Type of Visit: Evaluation    {Disappearing TIP  Adult Abuse Screen not completed in this Encounter. Please complete screening!:635227}    Fall Risk Screen:{TIP  If fall risk screening needs updating, please click link.:169352}  Fall screen completed by: PT  Have you fallen 2 or more times in the past year?: (Patient-Rptd) No  Have you fallen and had an injury in the past year?: (Patient-Rptd) No  Is patient a fall risk?: No    Subjective   Patient reports to physical therapy following referral from Dr. Grover for neck and L shoulder/upper back pain.  Pain has come and gone, but is now staying more consistent, no injury that he remembers but has been ongoing for about a month.  Pain started in left should and has now moved more into the middle and right side but L is worse.  Pain is aching, worse with lifting; better with rest.  Has a mile headache infrequently here and there, usually worse when neck pain is worse.  Exercise is biking in summer, stretches from PT for ankles and some stretching.  Patient is left-handed but uses right hand occasionally.     {Disappearing TIP  Health History pop-up / flowsheet :448535}   Presenting condition or subjective complaint: (Patient-Rptd) Neck and left shoulder pain  Date of onset: 12/02/24 (provider referral date) {Disappearing TIP  Date of onset/injury :084489}   Relevant medical history:     Dates & types of surgery:      Prior diagnostic imaging/testing results: (Patient-Rptd) X-ray     Prior therapy history for the same diagnosis, illness or injury: (Patient-Rptd) No      Prior Level of Function  Transfers: Independent  Ambulation: Independent  ADL: Independent  IADL: Driving, Finances, Housekeeping, Laundry, Meal preparation, Medication management, Yard work    Living Environment  Social support: (Patient-Rptd) With a significant other or spouse   Type of home: (Patient-Rptd) House   Stairs to enter the home: (Patient-Rptd) Yes  (Patient-Rptd) 5 Is there a railing: (Patient-Rptd) Yes     Ramp: (Patient-Rptd) No   Stairs inside the home: (Patient-Rptd) Yes (Patient-Rptd) 15 Is there a railing: (Patient-Rptd) Yes     Help at home: (Patient-Rptd) None  Equipment owned:       Employment: (Patient-Rptd) Not Applicable    Hobbies/Interests:      Patient goals for therapy: (Patient-Rptd) Just reduce pain for everyday tasks    Pain assessment: See objective evaluation for additional pain details     Objective   CERVICAL SPINE EVALUATION  PAIN: Pain Level at Rest: 0/10  Pain Level with Use: 2/10  Pain Location: cervical spine  Has been getting worse over the month, not debilitating  INTEGUMENTARY (edema, incisions): {ERIC PT Integumentary (Optional):772595}  POSTURE: {ERIC PT Posture (Optional):086907}  GAIT:   Weightbearing Status: {ERIC PT GT WB STATUS (Optional):628821}  Assistive Device(s): {ERIC PT EQUIP (Optional):525098}  Gait Deviations: {ERIC PT GT DEV (Optional):234291}  BALANCE/PROPRIOCEPTION: {eric pt bal/proprioception (Optional):410670}  WEIGHTBEARING ALIGNMENT: {ERIC PT WB Alignment (Optional):552899}  ROM:   (Degrees) Left AROM Right AROM    Cervical Flexion 48    Cervical Extension 26    Cervical Side bend 19 26    Cervical Rotation 56 55    Cervical Protrusion     Cervical Retraction     Thoracic Flexion WFL    Thoracic Extension Mod rest    Thoracic Rotation Min rest Mod res      Left AROM Left PROM Right AROM Right PROM   Shoulder Flexion       Shoulder Extension       Shoulder Abduction       Shoulder Adduction       Shoulder IR       Shoulder ER       Shoulder Horiz Abduction       Shoulder Horiz Adduction       Pain:   End Feel:     MYOTOMES:    Left Right   C1-2 (Neck Flexion) 5 5   C3 (Neck Side Bend)  5 5   C4 (Shrug) 5 5   C5 (Deltoid) 5 5   C6 (Biceps) 5 5   C7 (Triceps) 5 5   C8 (Thumb Ext) 5 5   T1 (Intrinsics) 5 5     DTR S:   CORD SIGNS:   DERMATOMES:    Left Right   C1 Normal (light touch) Normal (light touch)   C2 Normal  (light touch) Normal (light touch)   C3 Normal (light touch) Normal (light touch)   C4 Normal (light touch) Normal (light touch)   C5 Normal (light touch) Normal (light touch)   C6 Normal (light touch) Normal (light touch)   C7 Normal (light touch) Normal (light touch)   C8 Normal (light touch) Normal (light touch)   T1 Normal (light touch) Normal (light touch)     NEURAL TENSION:   FLEXIBILITY:    SPECIAL TESTS:    Left Right   Alar Ligament Negative    Cervical Flexion-Rotation Negative  Negative    Cervical Rot/Lateral Flex Negative  Negative    Compression Negative  Negative    Distraction Negative  Negative    Spurling s Negative  Negative    Thoracic Outlet Screen (Manny, Adson)     Transverse Ligament     Vertebral Artery Negative  Negative    Cotton Roll Test     Craniocervical Flexor Endurance Test     Mannheimer Test            PALPATION:   + Tenderness At Location Left Right   Sternocleidomastoid - -   Scalenes - -   Rhomboids - -   Facet + -   Upper Trap + -   Levator + -   Erector Spinae + -   Suboccipitals + -     SPINAL SEGMENTAL CONCLUSIONS:  Decreased L side glide C5-6      Assessment & Plan   CLINICAL IMPRESSIONS  Medical Diagnosis: Pain radiating to left shoulder (M25.512), Neck pain (M54.2)  {Disappearing TIP  Medical Dx :056897}  Treatment Diagnosis: neck pain, upper back pain {Disappearing TIP  Treatment Dx :603616}  Impression/Assessment: Patient is a 73 year old male with neck complaints.  The following significant findings have been identified: Pain, Decreased ROM/flexibility, Decreased joint mobility, Decreased strength, Impaired muscle performance, and Decreased activity tolerance. These impairments interfere with their ability to perform self care tasks and recreational activities as compared to previous level of function.     Clinical Decision Making (Complexity):  Clinical Presentation: Stable/Uncomplicated  Clinical Presentation Rationale: based on medical and personal factors listed  in PT evaluation  Clinical Decision Making (Complexity): Low complexity    PLAN OF CARE  Treatment Interventions:  Modalities: Cryotherapy, Dry Needling  Interventions: Manual Therapy, Neuromuscular Re-education, Therapeutic Activity, Therapeutic Exercise, Self-Care/Home Management    Long Term Goals {Disappearing TIP  Goals :810959}         {Disapparing TIP  Frequency/Duration :850360}  Frequency of Treatment: every 1-2 weeks  Duration of Treatment: 12 weeks    Recommended Referrals to Other Professionals:  No further referral needed   Education Assessment: {Disapparing TIP  Patient Education :165981}  Learner/Method: Patient;No Barriers to Learning    Risks and benefits of evaluation/treatment have been explained.   Patient/Family/caregiver agrees with Plan of Care.     Evaluation Time:   {Disappearing TIP  Eval Minutes :388893}     Evaluation Only     Signing Clinician: Radha Aguirre, PT        [facility]                                                                                   OUTPATIENT PHYSICAL THERAPY  {Disappearing TIP  Cert Quick Add :472221}    PLAN OF TREATMENT FOR OUTPATIENT REHABILITATION   Patient's Last Name, First Name, Juan Jose Moon YOB: 1950   Provider's Name   [facility]   Medical Record No.  [id number]     Onset Date: 12/02/24 (provider referral date)  Start of Care Date: 12/05/24     Medical Diagnosis:  Pain radiating to left shoulder (M25.512), Neck pain (M54.2)      PT Treatment Diagnosis:  neck pain, upper back pain Plan of Treatment  Frequency/Duration: every 1-2 weeks/ 12 weeks    Certification date from 12/05/24 to 02/27/25         See note for plan of treatment details and functional goals     Radha Aguirre, PT                         I CERTIFY THE NEED FOR THESE SERVICES FURNISHED UNDER        THIS PLAN OF TREATMENT AND WHILE UNDER MY CARE     (Physician attestation of this document  indicates review and certification of the therapy plan).              Referring Provider:  Alphonso Grover    Initial Assessment  See Epic Evaluation- Start of Care Date: 12/05/24

## 2024-12-12 ENCOUNTER — THERAPY VISIT (OUTPATIENT)
Dept: PHYSICAL THERAPY | Facility: CLINIC | Age: 74
End: 2024-12-12
Attending: FAMILY MEDICINE
Payer: COMMERCIAL

## 2024-12-12 DIAGNOSIS — M54.2 NECK PAIN: ICD-10-CM

## 2024-12-12 DIAGNOSIS — M25.512 PAIN RADIATING TO LEFT SHOULDER: Primary | ICD-10-CM

## 2024-12-26 ENCOUNTER — THERAPY VISIT (OUTPATIENT)
Dept: PHYSICAL THERAPY | Facility: CLINIC | Age: 74
End: 2024-12-26
Attending: FAMILY MEDICINE
Payer: COMMERCIAL

## 2024-12-26 DIAGNOSIS — M25.512 PAIN RADIATING TO LEFT SHOULDER: Primary | ICD-10-CM

## 2024-12-30 DIAGNOSIS — I10 HYPERTENSION GOAL BP (BLOOD PRESSURE) < 140/90: ICD-10-CM

## 2024-12-30 RX ORDER — METOPROLOL SUCCINATE 100 MG/1
100 TABLET, EXTENDED RELEASE ORAL DAILY
Qty: 90 TABLET | Refills: 2 | Status: SHIPPED | OUTPATIENT
Start: 2024-12-30

## 2025-01-14 ENCOUNTER — THERAPY VISIT (OUTPATIENT)
Dept: PHYSICAL THERAPY | Facility: CLINIC | Age: 75
End: 2025-01-14
Payer: COMMERCIAL

## 2025-01-14 DIAGNOSIS — M54.2 NECK PAIN: ICD-10-CM

## 2025-01-14 DIAGNOSIS — M25.512 PAIN RADIATING TO LEFT SHOULDER: Primary | ICD-10-CM

## 2025-01-14 PROCEDURE — 97110 THERAPEUTIC EXERCISES: CPT | Mod: GP | Performed by: PHYSICAL THERAPIST

## 2025-01-14 PROCEDURE — 97140 MANUAL THERAPY 1/> REGIONS: CPT | Mod: GP | Performed by: PHYSICAL THERAPIST

## 2025-01-20 ENCOUNTER — TELEPHONE (OUTPATIENT)
Dept: GASTROENTEROLOGY | Facility: CLINIC | Age: 75
End: 2025-01-20
Payer: COMMERCIAL

## 2025-01-20 NOTE — TELEPHONE ENCOUNTER
Pre assessment completed for upcoming procedure.   (Please see previous telephone encounter notes for complete details)    Procedure details:    Arrival time and facility location reviewed.    Pre op exam needed? No.    Designated  policy reviewed. Instructed to have someone stay 24  hours post procedure.       Medication review:    Medications reviewed. Please see supporting documentation below. Holding recommendations discussed (if applicable).       Prep for procedure:     Procedure prep instructions reviewed.        Any additional information needed:  N/A      Patient verbalized understanding and had no questions or concerns at this time.      Omayra Deshpande LPN  Endoscopy Procedure Pre Assessment   [phone] option 2

## 2025-01-20 NOTE — TELEPHONE ENCOUNTER
"Endoscopy Scheduling Screen    Have you had any respiratory illness or flu-like symptoms in the last 10 days?  No    What is your communication preference for Instructions and/or Bowel Prep?   MyChart    What insurance is in the chart?  Other:  BCBS    Ordering/Referring Provider: Alphonso Grover MD   (If ordering provider performs procedure, schedule with ordering provider unless otherwise instructed. )    BMI: Estimated body mass index is 38.09 kg/m  as calculated from the following:    Height as of 12/2/24: 1.912 m (6' 3.28\").    Weight as of 12/2/24: 139.3 kg (307 lb).     Sedation Ordered  moderate sedation.   If patient BMI > 50 do not schedule in [facility].    If patient BMI > 45 do not schedule at [facility].    Are you taking methadone or Suboxone?  NO, No RN review required.    Have you been diagnosed and are being treated for severe PTSD or severe anxiety?  NO, No RN review required.    Are you taking any prescription medications for pain 3 or more times per week?   NO, No RN review required.    Do you have a history of malignant hyperthermia?  No    (Females) Are you currently pregnant?   No     Have you been diagnosed or told you have pulmonary hypertension?   No    Do you have an LVAD?  No    Have you been told you have moderate to severe sleep apnea?  Yes. Do you use a CPAP? Yes Where is the patient located?. (RN Review required for scheduling unless scheduling in Hospital.)     Have you been told you have COPD, asthma, or any other lung disease?  No    Do you have any heart conditions?  No     Have you ever had or are you waiting for an organ transplant?  No. Continue scheduling, no site restrictions.    Have you had a stroke or transient ischemic attack (TIA aka \"mini stroke\" in the last 6 months?   No    Have you been diagnosed with or been told you have cirrhosis of the liver?   No.    Are you currently on dialysis?   No    Do you need assistance transferring?   No    BMI: Estimated body mass index " "is 38.09 kg/m  as calculated from the following:    Height as of 12/2/24: 1.912 m (6' 3.28\").    Weight as of 12/2/24: 139.3 kg (307 lb).     Is patients BMI > 40 and scheduling location [facility]?  No    Do you take an injectable or oral medication for weight loss or diabetes (excluding insulin)?  No    Do you take the medication Naltrexone?  No    Do you take blood thinners?  No       Prep   Are you currently on dialysis or do you have chronic kidney disease?  No    Do you have a diagnosis of diabetes?  No    Do you have a diagnosis of cystic fibrosis (CF)?  No    On a regular basis do you go 3 -5 days between bowel movements?  No    BMI > 40?  No    Preferred Pharmacy:    [facility] [address]  [address] [facility], [place] [address]  Phone: [phone] Fax: [phone]      Final Scheduling Details     Procedure scheduled  Colonoscopy    Surgeon:  Leventhal      Date of procedure:  01/27/2025     Pre-OP / PAC:   No - Not required for this site.    Location  [facility] - Per exclusion criteria.    Sedation   Moderate Sedation - Per order.      Patient Reminders:   You will receive a call from a Nurse to review instructions and health history.  This assessment must be completed prior to your procedure.  Failure to complete the Nurse assessment may result in the procedure being cancelled.      On the day of your procedure, please designate an adult(s) who can drive you home stay with you for the next 24 hours. The medicines used in the exam will make you sleepy. You will not be able to drive.      You cannot take public transportation, ride share services, or non-medical taxi service without a responsible caregiver.  Medical transport services are allowed with the requirement that a responsible caregiver will receive you at your destination.  We require that drivers and caregivers are confirmed prior to your procedure.  "

## 2025-01-20 NOTE — TELEPHONE ENCOUNTER
Pre visit planning completed.    Procedure details:    Patient scheduled for Colonoscopy on 01/27/2025.     Arrival time: 0915. Procedure time 1015    Facility location: [facility]; [address], [address], [place] [address]. Check in location: Main entrance at registration desk.    Sedation type: Conscious sedation     Pre op exam needed? No.    Indication for procedure: Screening, History of polyps.     Chart review:     Electronic implanted devices? No    Recent diagnosis of diverticulitis within the last 6 weeks? No    Medication review:    Diabetic? No    Anticoagulants? No    Weight loss medication/injectable? No GLP-1 medication per patient's medication list. Nursing to verify with pre-assessment call.    Other medication HOLDING recommendations:  N/A    Prep for procedure:     Bowel prep recommendation: Standard Miralax.   Due to: standard bowel prep    Procedure information and instructions sent via [employer]       Susy Kilpatrick RN  Endoscopy Procedure Pre Assessment   [phone] option 2

## 2025-01-21 ENCOUNTER — THERAPY VISIT (OUTPATIENT)
Dept: PHYSICAL THERAPY | Facility: CLINIC | Age: 75
End: 2025-01-21
Payer: COMMERCIAL

## 2025-01-21 DIAGNOSIS — M54.2 NECK PAIN: Primary | ICD-10-CM

## 2025-01-21 PROCEDURE — 97140 MANUAL THERAPY 1/> REGIONS: CPT | Mod: GP | Performed by: PHYSICAL THERAPIST

## 2025-01-21 PROCEDURE — 97530 THERAPEUTIC ACTIVITIES: CPT | Mod: GP | Performed by: PHYSICAL THERAPIST

## 2025-01-27 ENCOUNTER — HOSPITAL ENCOUNTER (OUTPATIENT)
Facility: CLINIC | Age: 75
Discharge: HOME | End: 2025-01-27
Attending: INTERNAL MEDICINE | Admitting: INTERNAL MEDICINE
Payer: COMMERCIAL

## 2025-01-27 VITALS
DIASTOLIC BLOOD PRESSURE: 80 MMHG | HEART RATE: 66 BPM | SYSTOLIC BLOOD PRESSURE: 130 MMHG | RESPIRATION RATE: 15 BRPM | OXYGEN SATURATION: 100 %

## 2025-01-27 LAB — COLONOSCOPY: NORMAL

## 2025-01-27 PROCEDURE — G0500 MOD SEDAT ENDO SERVICE >5YRS: HCPCS | Mod: PT | Performed by: INTERNAL MEDICINE

## 2025-01-27 PROCEDURE — 45385 COLONOSCOPY W/LESION REMOVAL: CPT | Mod: PT | Performed by: INTERNAL MEDICINE

## 2025-01-27 PROCEDURE — 45380 COLONOSCOPY AND BIOPSY: CPT | Performed by: INTERNAL MEDICINE

## 2025-01-27 PROCEDURE — 99153 MOD SED SAME PHYS/QHP EA: CPT | Performed by: INTERNAL MEDICINE

## 2025-01-27 PROCEDURE — 88305 TISSUE EXAM BY PATHOLOGIST: CPT | Mod: 26 | Performed by: PATHOLOGY

## 2025-01-27 PROCEDURE — 250N000011 HC RX IP 250 OP 636: Performed by: INTERNAL MEDICINE

## 2025-01-27 PROCEDURE — 88305 TISSUE EXAM BY PATHOLOGIST: CPT | Mod: TC | Performed by: INTERNAL MEDICINE

## 2025-01-27 RX ORDER — ONDANSETRON 2 MG/ML
4 INJECTION INTRAMUSCULAR; INTRAVENOUS
Status: DISCONTINUED | OUTPATIENT
Start: 2025-01-27 | End: 2025-01-27 | Stop reason: HOSPADM

## 2025-01-27 RX ORDER — FENTANYL CITRATE 50 UG/ML
INJECTION, SOLUTION INTRAMUSCULAR; INTRAVENOUS PRN
Status: DISCONTINUED | OUTPATIENT
Start: 2025-01-27 | End: 2025-01-27 | Stop reason: HOSPADM

## 2025-01-27 RX ORDER — LIDOCAINE 40 MG/G
CREAM TOPICAL
Status: DISCONTINUED | OUTPATIENT
Start: 2025-01-27 | End: 2025-01-27 | Stop reason: HOSPADM

## 2025-01-27 ASSESSMENT — ACTIVITIES OF DAILY LIVING (ADL)
ADLS_ACUITY_SCORE: 41

## 2025-02-10 PROBLEM — D12.6 ADENOMATOUS COLON POLYP: Status: ACTIVE | Noted: 2025-02-10

## 2025-02-11 ENCOUNTER — THERAPY VISIT (OUTPATIENT)
Dept: PHYSICAL THERAPY | Facility: CLINIC | Age: 75
End: 2025-02-11
Payer: COMMERCIAL

## 2025-02-11 ENCOUNTER — PATIENT OUTREACH (OUTPATIENT)
Dept: GASTROENTEROLOGY | Facility: CLINIC | Age: 75
End: 2025-02-11

## 2025-02-11 DIAGNOSIS — M54.2 NECK PAIN: Primary | ICD-10-CM

## 2025-02-11 PROCEDURE — 97112 NEUROMUSCULAR REEDUCATION: CPT | Mod: GP | Performed by: PHYSICAL THERAPIST

## 2025-02-11 PROCEDURE — 97530 THERAPEUTIC ACTIVITIES: CPT | Mod: GP | Performed by: PHYSICAL THERAPIST

## 2025-02-11 PROCEDURE — 97110 THERAPEUTIC EXERCISES: CPT | Mod: GP | Performed by: PHYSICAL THERAPIST

## 2025-02-11 NOTE — PROGRESS NOTES
DISCHARGE REPORT    Progress reporting period is from 12-5-2024 to 2-.       SUBJECTIVE  Subjective changes noted by patient:  Overall improvement has been moderate to significant. He is able to carry objects weighing 2-25 pounds without pain. Juan Jose feels comfortable continuing with home program.       Current pain level is 1/10  .        .   Changes in function:  Yes (See Goal flowsheet attached for changes in current functional level)  Adverse reaction to treatment or activity: None    OBJECTIVE  Changes noted in objective findings:  Yes,         ASSESSMENT/PLAN  Updated problem list and treatment plan: ROM:          (Degrees) Left AROM Right AROM     Cervical Flexion  90 %    Cervical Extension  40 % with  end range stiffness    Cervical Side bend  20 %  30 %    Cervical Rotation 56 55    Cervical Protrusion      Cervical Retraction      Thoracic Flexion WFL    Thoracic Extension Mod rest    Thoracic Rotation Min rest Mod res       Left AROM Left PROM Right AROM Right PROM   Shoulder Flexion           Shoulder Extension           Shoulder Abduction           Shoulder Adduction           Shoulder IR           Shoulder ER           Shoulder Horiz Abduction           Shoulder Horiz Adduction            Patient demonstrates good scapular stabilization   with shoulder girdle strengthening ex, some cueing needed for neutral cervical spine                     Assessment & Plan  STG/LTGs have been met or progress has been made towards goals:  Yes (See Goal flow sheet completed today.)  Assessment of Progress: The patient has met all of their long term goals.  Self Management Plans:  Patient is independent in a home treatment program.  Patient is independent in self management of symptoms.  I have re-evaluated this patient and find that the nature, scope, duration and intensity of the therapy is appropriate for the medical condition of the patient.  Juan Jose continues to require the following intervention to meet STG  and LTG's:  PT intervention is no longer required to meet STG/LTG.    Recommendations:  This patient is ready to be discharged from therapy and continue their home treatment program.    Please refer to the daily flowsheet for treatment today, total treatment time and time spent performing 1:1 timed codes.

## 2025-03-14 NOTE — PROGRESS NOTES
"CPAP Follow-Up Visit:    Date on this visit: 3/17/2025    Juan Jose Razo has a follow-up visit today to review his CPAP use for BUCK. He was initially seen for snoring and observed apnea.     Previous Study Results:   Date: 10/12/2017.  Weight 281 pounds.  AHI: 32.7/hr. O2 jessica 76%.  Baseline oxygen saturation was 90%.  Time with saturation less than or equal to 88% was 67 minutes.   Oximetry was performed  on CPAP in 11/2017 and it did not show significant residual hypoxemia.    ResMed   Auto-PAP 10.0 - 17.0 cmH2O 30 day usage data:    100% of days with > 4 hours of use. 0/30 days with no use.   Average use 521 minutes per day.   95%ile Leak 8.13 L/min.   CPAP 95% pressure 16.3 cm.   AHI 5.65 events per hour.       Juan Jose was previously followed by Dr. Briseno.   He uses CPAP regularly and has no issues.   He sleeps on his right side, sometimes back.       Cpap and supplies from [facility]    Do you use a CPAP Machine at home: (Patient-Rptd) Yes  Overall, on a scale of 0-10 how would you rate your CPAP (0 poor, 10 great): (Patient-Rptd) 10    What type of mask do you use: (Patient-Rptd) Full Face Mask  Is your mask comfortable: (Patient-Rptd) Yes  If not, why:    How often do you replace supplies: \"not as often as I should.\" Mask may be every 3 months.    Is your mask leaking: (Patient-Rptd) No  If yes, where do you feel it:    How many night per week does the mask leak (0-7):      Do you notice snoring with mask on: (Patient-Rptd) No  Do you notice gasping arousals with mask on: (Patient-Rptd) No  Are you having significant oral or nasal dryness: (Patient-Rptd) No  Are you using the humidifier: yes  Does the water chamber run out before the night is over:sometimes  Do you get condensation in the mask or hose:no  Is the pressure setting comfortable: (Patient-Rptd) Yes  If not, why:      Typical bedtime: (Patient-Rptd) 10:30 pm  Sleep latency on PAP therapy: (Patient-Rptd) 15 min  Typical wake time: (Patient-Rptd) 7:00 " am  Wakes 2-3 times per night for 10 minutes. Reason for waking: restroom  How many hours on average per night are you using PAP therapy: (Patient-Rptd) All  How many hours are you sleeping per night: (Patient-Rptd) 8.5  Do you feel well rested in the morning: (Patient-Rptd) Yes    Naps: none.         Weight change since sleep study: 290 lbs      Past medical/surgical history, family history, social history, medications and allergies were reviewed.      Problem List:  Patient Active Problem List    Diagnosis Date Noted    Adenomatous colon polyp 02/10/2025     Priority: Medium    Morbid obesity (H) 05/23/2018     Priority: Medium    Multiple benign nevi 09/06/2015     Priority: Medium    Seborrheic keratoses 09/06/2015     Priority: Medium    Skin cancer screening 09/06/2015     Priority: Medium    Skin exam, screening for cancer 10/24/2013     Priority: Medium    CARDIOVASCULAR SCREENING; LDL GOAL LESS THAN 130 03/06/2012     Priority: Medium    Hypertension goal BP (blood pressure) < 140/90 03/06/2012     Priority: Medium    GERD (gastroesophageal reflux disease) 03/06/2012     Priority: Medium    Seasonal allergic rhinitis 03/06/2012     Priority: Medium        Impression/Plan:    (G47.33) BUCK (obstructive sleep apnea)  (primary encounter diagnosis)  Comment: Brain is using CPAP regularly and benefits from use.  He has no concerns.  His download shows a mildly elevated AHI.  It appears he may be having some residual hypopneas, possibly in a REM related pattern.  Plan: Comprehensive DME        Continue auto CPAP 10-16 cm.  We discussed the possibility of increasing his pressures mildly, to address the residual AHI.  He was concerned about trying to keep the mask sealed with higher pressures.  I will leave the pressures where they are for now.  I encouraged him to reach out if he feels he is having worsening symptoms.   A prescription was written for new supplies. We reviewed recommendations for cleaning and  replacing supplies.        He will follow up with me in about 1 year(s).     27 minutes were spent on the date of the encounter doing chart review, history and exam, documentation and further activities as noted above.     Bennett Goltz, PA-C    CC: No ref. provider found

## 2025-03-16 ASSESSMENT — SLEEP AND FATIGUE QUESTIONNAIRES
HOW LIKELY ARE YOU TO NOD OFF OR FALL ASLEEP WHILE SITTING AND READING: SLIGHT CHANCE OF DOZING
HOW LIKELY ARE YOU TO NOD OFF OR FALL ASLEEP WHILE SITTING AND TALKING TO SOMEONE: WOULD NEVER DOZE
HOW LIKELY ARE YOU TO NOD OFF OR FALL ASLEEP WHILE WATCHING TV: SLIGHT CHANCE OF DOZING
HOW LIKELY ARE YOU TO NOD OFF OR FALL ASLEEP WHILE LYING DOWN TO REST IN THE AFTERNOON WHEN CIRCUMSTANCES PERMIT: HIGH CHANCE OF DOZING
HOW LIKELY ARE YOU TO NOD OFF OR FALL ASLEEP WHILE SITTING INACTIVE IN A PUBLIC PLACE: WOULD NEVER DOZE
HOW LIKELY ARE YOU TO NOD OFF OR FALL ASLEEP WHEN YOU ARE A PASSENGER IN A CAR FOR AN HOUR WITHOUT A BREAK: SLIGHT CHANCE OF DOZING
HOW LIKELY ARE YOU TO NOD OFF OR FALL ASLEEP IN A CAR, WHILE STOPPED FOR A FEW MINUTES IN TRAFFIC: WOULD NEVER DOZE
HOW LIKELY ARE YOU TO NOD OFF OR FALL ASLEEP WHILE SITTING QUIETLY AFTER LUNCH WITHOUT ALCOHOL: SLIGHT CHANCE OF DOZING

## 2025-03-17 ENCOUNTER — VIRTUAL VISIT (OUTPATIENT)
Dept: FAMILY MEDICINE | Facility: CLINIC | Age: 75
End: 2025-03-17
Payer: COMMERCIAL

## 2025-03-17 ENCOUNTER — VIRTUAL VISIT (OUTPATIENT)
Dept: SLEEP MEDICINE | Facility: CLINIC | Age: 75
End: 2025-03-17
Payer: COMMERCIAL

## 2025-03-17 VITALS — BODY MASS INDEX: 36.06 KG/M2 | HEIGHT: 75 IN | WEIGHT: 290 LBS

## 2025-03-17 DIAGNOSIS — E66.01 SEVERE OBESITY (BMI 35.0-35.9 WITH COMORBIDITY) (H): ICD-10-CM

## 2025-03-17 DIAGNOSIS — G47.33 OSA (OBSTRUCTIVE SLEEP APNEA): Primary | ICD-10-CM

## 2025-03-17 DIAGNOSIS — M79.672 ARCH PAIN OF LEFT FOOT: ICD-10-CM

## 2025-03-17 DIAGNOSIS — I10 HYPERTENSION GOAL BP (BLOOD PRESSURE) < 140/90: ICD-10-CM

## 2025-03-17 PROCEDURE — 98005 SYNCH AUDIO-VIDEO EST LOW 20: CPT | Performed by: PHYSICIAN ASSISTANT

## 2025-03-17 PROCEDURE — 98006 SYNCH AUDIO-VIDEO EST MOD 30: CPT | Performed by: FAMILY MEDICINE

## 2025-03-17 PROCEDURE — 1126F AMNT PAIN NOTED NONE PRSNT: CPT | Performed by: PHYSICIAN ASSISTANT

## 2025-03-17 ASSESSMENT — PAIN SCALES - GENERAL: PAINLEVEL_OUTOF10: NO PAIN (0)

## 2025-03-17 NOTE — PROGRESS NOTES
Juan Jose is a 74 year old who is being evaluated via a billable video visit.    How would you like to obtain your AVS? MyChart  If the video visit is dropped, the invitation should be resent by: Text to cell phone: [phone]  Will anyone else be joining your video visit? No      Assessment & Plan     BUCK (obstructive sleep apnea)  Overall well on c pap   Reviewed pros/ cons of Zepbound  He is not currently able to exercise ( foot pain)   Explained that this medication is a lot term decision and he needs to think about it more  Follow up in 1 month.     Severe obesity (BMI 35.0-35.9 with comorbidity) (H)  As above  Work on diet/ try biking    Arch pain of left foot  Worse  Follow up with consultant as planned.   - Orthopedic  Referral; Future            Work on weight loss  Regular exercise  See Patient Instructions    Subjective   Juan Jose is a 74 year old, presenting for the following health issues:  No chief complaint on file.        12/2/2024     7:56 AM   Additional Questions   Roomed by Cathy SPRINGER       Video Start Time:  427    History of Present Illness       Reason for visit:  Need for weight loss zepbound to help with sleep apnea    He eats 0-1 servings of fruits and vegetables daily.He consumes 1 sweetened beverage(s) daily.He exercises with enough effort to increase his heart rate 9 or less minutes per day.  He exercises with enough effort to increase his heart rate 4 days per week.   He is taking medications regularly.      Hypertension Follow-up    Do you check your blood pressure regularly outside of the clinic? Yes   Are you following a low salt diet? Yes  Are your blood pressures ever more than 140 on the top number (systolic) OR more   than 90 on the bottom number (diastolic), for example 140/90? No        Review of Systems  Constitutional, HEENT, cardiovascular, pulmonary, gi and gu systems are negative, except as otherwise noted.      Objective             Physical Exam   GENERAL: alert and no  distress  EYES: Eyes grossly normal to inspection.  No discharge or erythema, or obvious scleral/conjunctival abnormalities.  RESP: No audible wheeze, cough, or visible cyanosis.    SKIN: Visible skin clear. No significant rash, abnormal pigmentation or lesions.  NEURO: Cranial nerves grossly intact.  Mentation and speech appropriate for age.  PSYCH: Appropriate affect, tone, and pace of words    Reviewed Dec labs      Video-Visit Details    Type of service:  Video Visit   Video End Time:4:39 PM  Originating Location (pt. Location): Home    Distant Location (provider location):  On-site  Platform used for Video Visit: Lars  Signed Electronically by: Alphonso Grover MD

## 2025-03-17 NOTE — NURSING NOTE
Current patient location: [address] [place] [id number]    Is the patient currently in the state of [place]? YES    Visit mode: VIDEO    If the visit is dropped, the patient can be reconnected by:VIDEO VISIT: Text to cell phone:   Telephone Information:   Mobile [phone]       Will anyone else be joining the visit? NO  (If patient encounters technical issues they should call [phone])    Are changes needed to the allergy or medication list? No    Are refills needed on medications prescribed by this physician? NO    Rooming Documentation:  Questionnaire(s) completed    Reason for visit: RECHECK    Gary RUIZF

## 2025-03-17 NOTE — PROGRESS NOTES
Virtual Visit Details    Type of service:  Video Visit   Video Start Time: 1:22 PM  Video End Time:1:46 PM    Originating Location (pt. Location): Home    Distant Location (provider location):  Off-site  Platform used for Video Visit: Lars

## 2025-03-25 ENCOUNTER — OFFICE VISIT (OUTPATIENT)
Dept: PODIATRY | Facility: CLINIC | Age: 75
End: 2025-03-25
Attending: FAMILY MEDICINE
Payer: COMMERCIAL

## 2025-03-25 VITALS — OXYGEN SATURATION: 96 % | HEART RATE: 90 BPM

## 2025-03-25 DIAGNOSIS — M25.572 SINUS TARSI SYNDROME OF LEFT ANKLE: ICD-10-CM

## 2025-03-25 DIAGNOSIS — M76.822 POSTERIOR TIBIAL TENDINITIS OF LEFT LOWER EXTREMITY: ICD-10-CM

## 2025-03-25 DIAGNOSIS — M21.6X2 PRONATION DEFORMITY OF BOTH FEET: Primary | ICD-10-CM

## 2025-03-25 DIAGNOSIS — M21.6X1 PRONATION DEFORMITY OF BOTH FEET: Primary | ICD-10-CM

## 2025-03-25 PROCEDURE — 99204 OFFICE O/P NEW MOD 45 MIN: CPT | Performed by: PODIATRIST

## 2025-03-25 NOTE — PATIENT INSTRUCTIONS
We wish you continued good healing. If you have any questions or concerns, please do not hesitate to contact us at  [phone]    [facility] [employer] (secure e-mail communication and access to your chart) to send a message or to make an appointment.    Please remember to call and schedule a follow up appointment if one was recommended at your earliest convenience.     PODIATRY CLINIC HOURS  TELEPHONE NUMBER    Dr. Moisés MAXPFREIDA FACFAS        Clinics:  [facility]   [place]  Tuesday 1PM-6PM  [place]  Wednesday 745AM-330PM  [place]  Monday 2nd,4th  830AM-4PM  Thursday/Friday 745AM-230PM     [place] APPOINTMENTS  [phone]    [place] APPOINTMENTS  [phone]          If you need a medication refill, please contact us you may need lab work and/or a follow up visit prior to your refill (i.e. Antifungal medications).  If MRI needed please call Imaging at [phone]   HOW DO I GET MY KNEE SCOOTER? Knee scooters can be picked up at ANY Medical Supply stores with your knee scooter Prescription.  OR  Bring your signed prescription to an [facility] Medical Equipment showroom.   Set up an appointment for your custom Orthotics. Call any Orthotics locations call [phone]

## 2025-03-25 NOTE — LETTER
3/25/2025      Juan Jose Razo  [address] [id number]      Dear Colleague,    Thank you for referring your patient, Juan Jose Razo, to the [facility]. Please see a copy of my visit note below.    S: Patient seen today in consult from Dr. Grover and complains of left foot pain.  Points to posterior tibial tendon where is courses around the medial malleoli.  Has had this for 6 weeks.  Describes it as a burning pain.  Aggrevated by activity and relieved by rest.  Getting worse lately.  Patient is retired.  Wears slippers around the house.  Denies erythema ecchymosis weakness or increased deformity.  Also has left chronic foot pain.  Points to the sinus tarsi.  Has had this for years.  Waxes and wanes.  Has been seen by other physicians for this.  He has never had orthotics for his feet.  Denies lateral snapping.  Denies ecchymosis or erythema here.  Has never smoked.  Family history of cancer.    ROS:  A 10-point review of systems was performed and is positive for that noted in the HPI and as seen below.  All other areas are negative.      No Known Allergies    Current Outpatient Medications   Medication Sig Dispense Refill     amLODIPine (NORVASC) 5 MG tablet TAKE TWO TABLETS (10 MG) BY MOUTH ONCE DAILY 180 tablet 1     FAMOTIDINE 20 MG PO tablet TAKE TWO TABLETS BY MOUTH ONCE DAILY 60 tablet 0     metoprolol succinate ER (TOPROL XL) 100 MG 24 hr tablet TAKE 1 TABLET (100 MG) BY MOUTH DAILY 90 tablet 2     Multiple Vitamins-Minerals (CENTRUM SILVER ULTRA MENS PO)        nystatin (MYCOSTATIN) 282743 UNIT/GM external cream Apply daily to the groin. Increase to twice daily when rashes are present and self mix with triamcinolone. 60 g 11     omeprazole (PRILOSEC) 40 MG DR capsule TAKE ONE CAPSULE BY MOUTH ONCE DAILY 90 capsule 3     sildenafil (REVATIO) 20 MG tablet Take 2-3 as needed for ED 20 tablet 0     triamcinolone (KENALOG) 0.025 % external ointment Apply topically  2 times daily To groin rash until resolved. 60 g 5     triamcinolone (KENALOG) 0.1 % external ointment APPLY TOPICALLY TWICE DAILY TO AFFECTED AREAS OF THE RASH IN THE GROIN UP TO ONE WEEK 30 g 0     vitamin D3 (CHOLECALCIFEROL) 1000 units (25 mcg) tablet Take by mouth daily         Patient Active Problem List   Diagnosis     CARDIOVASCULAR SCREENING; LDL GOAL LESS THAN 130     Hypertension goal BP (blood pressure) < 140/90     GERD (gastroesophageal reflux disease)     Seasonal allergic rhinitis     Skin exam, screening for cancer     Multiple benign nevi     Seborrheic keratoses     Skin cancer screening     Morbid obesity (H)     Adenomatous colon polyp       Past Medical History:   Diagnosis Date     Acid reflux      Allergies      HTN (hypertension) 1990     Squamous cell carcinoma      Varicose veins        Past Surgical History:   Procedure Laterality Date     COLONOSCOPY  2001, 2006, 2010    polyp     COLONOSCOPY N/A 12/6/2021    Procedure: COLONOSCOPY, WITH POLYPECTOMY AND BIOPSY;  Surgeon: Michael Antonio MD;  Location:  GI     COLONOSCOPY N/A 1/27/2025    Procedure: COLONOSCOPY, WITH POLYPECTOMY AND BIOPSY;  Surgeon: Leventhal, Thomas Michael, MD;  Location: [facility]     keratinous cyst  12/09     LIGATE VEIN VARICOSE, PHLEBECTOMY MULTIPLE STAB, COMBINED  1995     MOHS MICROGRAPHIC PROCEDURE       NO HISTORY OF SURGERY  10/24/13    derm       Family History   Problem Relation Age of Onset     Cancer Mother         thyroid     Skin Cancer Father      Breast Cancer Sister      Melanoma No family hx of        Social History     Tobacco Use     Smoking status: Never     Smokeless tobacco: Never   Substance Use Topics     Alcohol use: Yes     Comment: 1-2 beers a week          Exam:  Vitals: Pulse 90   SpO2 96%   BMI: There is no height or weight on file to calculate BMI.  Height: Data Unavailable    Constitutional/ general:  Pt is in no apparent distress, appears well-nourished.  Cooperative with  history and physical exam.     Psych:  The patient answered questions appropriately.  Normal affect.  Seems to have reasonable expectations, in terms of treatment.     Eyes:  Visual scanning/ tracking without deficit.     Ears:  Response to auditory stimuli is normal.  No hearing aid devices.  Auricles in proper alignment.     Lymphatic:  Popliteal lymph nodes not enlarged.     Lungs:  Non labored breathing, non labored speech. No cough.  No audible wheezing. Even, quiet breathing.       Vascular:  Pedal pulses are palpable bilaterally for both the DP and PT arteries.  CFT < 3 sec. bilateral ankle edema and varicosities noted.  Pedal hair growth noted.     Neuro:  Alert and oriented x 3. Coordinated gait.  Light touch sensation is intact to the L4, L5, S1 distributions. No obvious deficits.  No evidence of neurological-based weakness, spasticity, or contracture in the lower extremities.    Derm: Normal texture and turgor.  No erythema, ecchymosis, or cyanosis.  No open lesions.     Musculoskeletal:    Lower extremity muscle strength is normal.  Patient is ambulatory without an assistive device or brace.  No gross deformities.  Normal ROM all fore foot and rearfoot joints.  No equinus.  With weightbearing patient has bilateral pronation.  Left pain on palpation posterior tibial tendon especially close to insertion on navicular.  No pain over tibialis anterior.  No pain with range of motion at TN joint or subtalar joint.  Hard to tell if there is edema in this area because of his significant varicosities and lower extremity edema bilaterally.  Patient can go up on the ball of his foot with minimal pain and good varus of the calcaneus.  Fat plug noted over sinus tarsi.  No history of pain on palpation at this time.    Narrative & Impression   3 views left ankle radiographs 11/22/2021 10:15 AM     History: Pain in joint involving ankle and foot, left     Comparison: No relevant prior.     Findings:     Standing AP,  oblique, and lateral  views of the left ankle were  obtained.      No acute osseous abnormality.  Ankle mortise and syndesmosis are  congruent on this non-weight bearing images.     . Small enthesophyte at the origin of plantar fascia. Achilles tendon  insertion is unremarkable.     Mild soft tissue edema about the lower leg and ankle.                                                                      Impression:  1. No acute osseous abnormality.  I have personally reviewed the examination and initial interpretation  and I agree with the findings.         A:    Pronation  Left posterior tibial tendinitis  Chronic sinus tarsi syndrome    P:  X-rays from past personally reviewed.  Explained cause of left sinus tarsi syndrome.  Explained cause of left posterior tibial tendinitis.  Discussed both of these are related to his pronation.  For long-term wrote prescription for orthotics.  Will wear these in good shoes.  I suggested good shoes inside and outside.  To help with immediate posterior tibial tendinitis will ice twice a day for 20 minutes.  Will wrap with Ace bandage.  Dispensed ankle brace today to help medialize calcaneus.  His shoes are poor quality.  Will get better tennis shoe.  Discussed if not improving neck step will be additional off loading possibly even a gym imaging.  Return to clinic in 2 to 3 weeks to ensure he is getting better.  Thank you for allowing me participate in the care of this patient.        Moisés Shine DPM, FACFAS             Again, thank you for allowing me to participate in the care of your patient.        Sincerely,        Moisés Shine DPM    Electronically signed

## 2025-03-26 NOTE — PROGRESS NOTES
S: Patient seen today in consult from Dr. Grover and complains of left foot pain.  Points to posterior tibial tendon where is courses around the medial malleoli.  Has had this for 6 weeks.  Describes it as a burning pain.  Aggrevated by activity and relieved by rest.  Getting worse lately.  Patient is retired.  Wears slippers around the house.  Denies erythema ecchymosis weakness or increased deformity.  Also has left chronic foot pain.  Points to the sinus tarsi.  Has had this for years.  Waxes and wanes.  Has been seen by other physicians for this.  He has never had orthotics for his feet.  Denies lateral snapping.  Denies ecchymosis or erythema here.  Has never smoked.  Family history of cancer.    ROS:  A 10-point review of systems was performed and is positive for that noted in the HPI and as seen below.  All other areas are negative.      No Known Allergies    Current Outpatient Medications   Medication Sig Dispense Refill    amLODIPine (NORVASC) 5 MG tablet TAKE TWO TABLETS (10 MG) BY MOUTH ONCE DAILY 180 tablet 1    FAMOTIDINE 20 MG PO tablet TAKE TWO TABLETS BY MOUTH ONCE DAILY 60 tablet 0    metoprolol succinate ER (TOPROL XL) 100 MG 24 hr tablet TAKE 1 TABLET (100 MG) BY MOUTH DAILY 90 tablet 2    Multiple Vitamins-Minerals (CENTRUM SILVER ULTRA MENS PO)       nystatin (MYCOSTATIN) 829595 UNIT/GM external cream Apply daily to the groin. Increase to twice daily when rashes are present and self mix with triamcinolone. 60 g 11    omeprazole (PRILOSEC) 40 MG DR capsule TAKE ONE CAPSULE BY MOUTH ONCE DAILY 90 capsule 3    sildenafil (REVATIO) 20 MG tablet Take 2-3 as needed for ED 20 tablet 0    triamcinolone (KENALOG) 0.025 % external ointment Apply topically 2 times daily To groin rash until resolved. 60 g 5    triamcinolone (KENALOG) 0.1 % external ointment APPLY TOPICALLY TWICE DAILY TO AFFECTED AREAS OF THE RASH IN THE GROIN UP TO ONE WEEK 30 g 0    vitamin D3 (CHOLECALCIFEROL) 1000 units (25 mcg) tablet  Take by mouth daily         Patient Active Problem List   Diagnosis    CARDIOVASCULAR SCREENING; LDL GOAL LESS THAN 130    Hypertension goal BP (blood pressure) < 140/90    GERD (gastroesophageal reflux disease)    Seasonal allergic rhinitis    Skin exam, screening for cancer    Multiple benign nevi    Seborrheic keratoses    Skin cancer screening    Morbid obesity (H)    Adenomatous colon polyp       Past Medical History:   Diagnosis Date    Acid reflux     Allergies     HTN (hypertension) 1990    Squamous cell carcinoma     Varicose veins        Past Surgical History:   Procedure Laterality Date    COLONOSCOPY  2001, 2006, 2010    polyp    COLONOSCOPY N/A 12/6/2021    Procedure: COLONOSCOPY, WITH POLYPECTOMY AND BIOPSY;  Surgeon: Michael Antonio MD;  [facility]: [facility]    COLONOSCOPY N/A 1/27/2025    Procedure: COLONOSCOPY, WITH POLYPECTOMY AND BIOPSY;  Surgeon: Leventhal, Thomas Michael, MD;  Location:  GI    keratinous cyst  12/09    LIGATE VEIN VARICOSE, PHLEBECTOMY MULTIPLE STAB, COMBINED  1995    MOHS MICROGRAPHIC PROCEDURE      NO HISTORY OF SURGERY  10/24/13    derm       Family History   Problem Relation Age of Onset    Cancer Mother         thyroid    Skin Cancer Father     Breast Cancer Sister     Melanoma No family hx of        Social History     Tobacco Use    Smoking status: Never    Smokeless tobacco: Never   Substance Use Topics    Alcohol use: Yes     Comment: 1-2 beers a week          Exam:  Vitals: Pulse 90   SpO2 96%   BMI: There is no height or weight on file to calculate BMI.  Height: Data Unavailable    Constitutional/ general:  Pt is in no apparent distress, appears well-nourished.  Cooperative with history and physical exam.     Psych:  The patient answered questions appropriately.  Normal affect.  Seems to have reasonable expectations, in terms of treatment.     Eyes:  Visual scanning/ tracking without deficit.     Ears:  Response to auditory stimuli is normal.  No hearing aid  devices.  Auricles in proper alignment.     Lymphatic:  Popliteal lymph nodes not enlarged.     Lungs:  Non labored breathing, non labored speech. No cough.  No audible wheezing. Even, quiet breathing.       Vascular:  Pedal pulses are palpable bilaterally for both the DP and PT arteries.  CFT < 3 sec. bilateral ankle edema and varicosities noted.  Pedal hair growth noted.     Neuro:  Alert and oriented x 3. Coordinated gait.  Light touch sensation is intact to the L4, L5, S1 distributions. No obvious deficits.  No evidence of neurological-based weakness, spasticity, or contracture in the lower extremities.    Derm: Normal texture and turgor.  No erythema, ecchymosis, or cyanosis.  No open lesions.     Musculoskeletal:    Lower extremity muscle strength is normal.  Patient is ambulatory without an assistive device or brace.  No gross deformities.  Normal ROM all fore foot and rearfoot joints.  No equinus.  With weightbearing patient has bilateral pronation.  Left pain on palpation posterior tibial tendon especially close to insertion on navicular.  No pain over tibialis anterior.  No pain with range of motion at TN joint or subtalar joint.  Hard to tell if there is edema in this area because of his significant varicosities and lower extremity edema bilaterally.  Patient can go up on the ball of his foot with minimal pain and good varus of the calcaneus.  Fat plug noted over sinus tarsi.  No history of pain on palpation at this time.    Narrative & Impression   3 views left ankle radiographs 11/22/2021 10:15 AM     History: Pain in joint involving ankle and foot, left     Comparison: No relevant prior.     Findings:     Standing AP, oblique, and lateral  views of the left ankle were  obtained.      No acute osseous abnormality.  Ankle mortise and syndesmosis are  congruent on this non-weight bearing images.     . Small enthesophyte at the origin of plantar fascia. Achilles tendon  insertion is unremarkable.     Mild  soft tissue edema about the lower leg and ankle.                                                                      Impression:  1. No acute osseous abnormality.  I have personally reviewed the examination and initial interpretation  and I agree with the findings.         A:    Pronation  Left posterior tibial tendinitis  Chronic sinus tarsi syndrome    P:  X-rays from past personally reviewed.  Explained cause of left sinus tarsi syndrome.  Explained cause of left posterior tibial tendinitis.  Discussed both of these are related to his pronation.  For long-term wrote prescription for orthotics.  Will wear these in good shoes.  I suggested good shoes inside and outside.  To help with immediate posterior tibial tendinitis will ice twice a day for 20 minutes.  Will wrap with Ace bandage.  Dispensed ankle brace today to help medialize calcaneus.  His shoes are poor quality.  Will get better tennis shoe.  Discussed if not improving neck step will be additional off loading possibly even a gym imaging.  Return to clinic in 2 to 3 weeks to ensure he is getting better.  Thank you for allowing me participate in the care of this patient.        Moisés Shine, MIKA, FACFAS

## 2025-04-08 ENCOUNTER — OFFICE VISIT (OUTPATIENT)
Dept: PODIATRY | Facility: CLINIC | Age: 75
End: 2025-04-08
Payer: COMMERCIAL

## 2025-04-08 DIAGNOSIS — M76.822 POSTERIOR TIBIAL TENDINITIS OF LEFT LOWER EXTREMITY: ICD-10-CM

## 2025-04-08 DIAGNOSIS — M25.572 SINUS TARSI SYNDROME OF LEFT ANKLE: ICD-10-CM

## 2025-04-08 DIAGNOSIS — M21.6X1 PRONATION DEFORMITY OF BOTH FEET: Primary | ICD-10-CM

## 2025-04-08 DIAGNOSIS — M21.6X2 PRONATION DEFORMITY OF BOTH FEET: Primary | ICD-10-CM

## 2025-04-08 PROCEDURE — 99213 OFFICE O/P EST LOW 20 MIN: CPT | Performed by: PODIATRIST

## 2025-04-08 NOTE — PROGRESS NOTES
S:   3/25/25   Patient seen today in consult from Dr. Grover and complains of left foot pain.  Points to posterior tibial tendon where is courses around the medial malleoli.  Has had this for 6 weeks.  Describes it as a burning pain.  Aggrevated by activity and relieved by rest.  Getting worse lately.  Patient is retired.  Wears slippers around the house.  Denies erythema ecchymosis weakness or increased deformity.  Also has left chronic foot pain.  Points to the sinus tarsi.  Has had this for years.  Waxes and wanes.  Has been seen by other physicians for this.  He has never had orthotics for his feet.  Denies lateral snapping.  Denies ecchymosis or erythema here.  Has never smoked.  Family history of cancer.    4/8/25 patient returns for left posterior tibial tendinitis.  The brace has been helpful.  He is 75% better.  He is no longer limping.  He is getting orthotics in the week.  Denies any new weakness or bruising.  He has no other new complaints.    ROS:  see above     No Known Allergies    Current Outpatient Medications   Medication Sig Dispense Refill    amLODIPine (NORVASC) 5 MG tablet TAKE TWO TABLETS (10 MG) BY MOUTH ONCE DAILY 180 tablet 1    FAMOTIDINE 20 MG PO tablet TAKE TWO TABLETS BY MOUTH ONCE DAILY 60 tablet 0    metoprolol succinate ER (TOPROL XL) 100 MG 24 hr tablet TAKE 1 TABLET (100 MG) BY MOUTH DAILY 90 tablet 2    Multiple Vitamins-Minerals (CENTRUM SILVER ULTRA MENS PO)       nystatin (MYCOSTATIN) 758019 UNIT/GM external cream Apply daily to the groin. Increase to twice daily when rashes are present and self mix with triamcinolone. 60 g 11    omeprazole (PRILOSEC) 40 MG DR capsule TAKE ONE CAPSULE BY MOUTH ONCE DAILY 90 capsule 3    sildenafil (REVATIO) 20 MG tablet Take 2-3 as needed for ED 20 tablet 0    triamcinolone (KENALOG) 0.025 % external ointment Apply topically 2 times daily To groin rash until resolved. 60 g 5    triamcinolone (KENALOG) 0.1 % external ointment APPLY TOPICALLY  TWICE DAILY TO AFFECTED AREAS OF THE RASH IN THE GROIN UP TO ONE WEEK 30 g 0    vitamin D3 (CHOLECALCIFEROL) 1000 units (25 mcg) tablet Take by mouth daily         Patient Active Problem List   Diagnosis    CARDIOVASCULAR SCREENING; LDL GOAL LESS THAN 130    Hypertension goal BP (blood pressure) < 140/90    GERD (gastroesophageal reflux disease)    Seasonal allergic rhinitis    Skin exam, screening for cancer    Multiple benign nevi    Seborrheic keratoses    Skin cancer screening    Morbid obesity (H)    Adenomatous colon polyp       Past Medical History:   Diagnosis Date    Acid reflux     Allergies     HTN (hypertension) 1990    Squamous cell carcinoma     Varicose veins        Past Surgical History:   Procedure Laterality Date    COLONOSCOPY  2001, 2006, 2010    polyp    COLONOSCOPY N/A 12/6/2021    Procedure: COLONOSCOPY, WITH POLYPECTOMY AND BIOPSY;  Surgeon: Michael Antonio MD;  Location:  GI    COLONOSCOPY N/A 1/27/2025    Procedure: COLONOSCOPY, WITH POLYPECTOMY AND BIOPSY;  Surgeon: Leventhal, Thomas Michael, MD;  Location: [facility]    keratinous cyst  12/09    LIGATE VEIN VARICOSE, PHLEBECTOMY MULTIPLE STAB, COMBINED  1995    MOHS MICROGRAPHIC PROCEDURE      NO HISTORY OF SURGERY  10/24/13    derm       Family History   Problem Relation Age of Onset    Cancer Mother         thyroid    Skin Cancer Father     Breast Cancer Sister     Melanoma No family hx of        Social History     Tobacco Use    Smoking status: Never    Smokeless tobacco: Never   Substance Use Topics    Alcohol use: Yes     Comment: 1-2 beers a week          Exam:  Vitals: There were no vitals taken for this visit.  BMI: There is no height or weight on file to calculate BMI.  Height: Data Unavailable    Constitutional/ general:  Pt is in no apparent distress, appears well-nourished.  Cooperative with history and physical exam.     Psych:  The patient answered questions appropriately.  Normal affect.  Seems to have reasonable  expectations, in terms of treatment.     Eyes:  Visual scanning/ tracking without deficit.     Ears:  Response to auditory stimuli is normal.  No hearing aid devices.  Auricles in proper alignment.     Lymphatic:  Popliteal lymph nodes not enlarged.     Lungs:  Non labored breathing, non labored speech. No cough.  No audible wheezing. Even, quiet breathing.       Vascular:  Pedal pulses are palpable bilaterally for both the DP and PT arteries.  CFT < 3 sec. bilateral ankle edema and varicosities noted.  Pedal hair growth noted.     Neuro:  Alert and oriented x 3. Coordinated gait.  Light touch sensation is intact to the L4, L5, S1 distributions. No obvious deficits.  No evidence of neurological-based weakness, spasticity, or contracture in the lower extremities.    Derm: Normal texture and turgor.  No erythema, ecchymosis, or cyanosis.  No open lesions.     Musculoskeletal:    Lower extremity muscle strength is normal.  Patient is ambulatory without an assistive device or brace.  No gross deformities.  Normal ROM all fore foot and rearfoot joints.  No equinus.  With weightbearing patient has bilateral pronation.  Left very minimal pain with palpation over posterior tibial tendon today.  No pain over tibialis anterior.  No pain with range of motion at TN joint or subtalar joint.  Hard to tell if there is edema in this area because of his significant varicosities and lower extremity edema bilaterally.  Patient can go up on the ball of his foot with minimal pain and good varus of the calcaneus.  Fat plug noted over sinus tarsi.  No history of pain on palpation at this time.    Narrative & Impression   3 views left ankle radiographs 11/22/2021 10:15 AM     History: Pain in joint involving ankle and foot, left     Comparison: No relevant prior.     Findings:     Standing AP, oblique, and lateral  views of the left ankle were  obtained.      No acute osseous abnormality.  Ankle mortise and syndesmosis are  congruent on  this non-weight bearing images.     . Small enthesophyte at the origin of plantar fascia. Achilles tendon  insertion is unremarkable.     Mild soft tissue edema about the lower leg and ankle.                                                                      Impression:  1. No acute osseous abnormality.  I have personally reviewed the examination and initial interpretation  and I agree with the findings.         A:    Pronation  Left posterior tibial tendinitis  Chronic sinus tarsi syndrome    P: Patient significantly better today.  Much less pain with exam.  Continue ankle brace until he gets orthotics.  Going forward will wear orthotics in a good shoe.  RTC as needed.      Moisés Shine, MIKA, FACFAS

## 2025-04-08 NOTE — LETTER
4/8/2025      Juan Jose Razo  [address] [id number]      Dear Colleague,    Thank you for referring your patient, Juan Jose Razo, to the [facility]. Please see a copy of my visit note below.    S:   3/25/25   Patient seen today in consult from Dr. Grover and complains of left foot pain.  Points to posterior tibial tendon where is courses around the medial malleoli.  Has had this for 6 weeks.  Describes it as a burning pain.  Aggrevated by activity and relieved by rest.  Getting worse lately.  Patient is retired.  Wears slippers around the house.  Denies erythema ecchymosis weakness or increased deformity.  Also has left chronic foot pain.  Points to the sinus tarsi.  Has had this for years.  Waxes and wanes.  Has been seen by other physicians for this.  He has never had orthotics for his feet.  Denies lateral snapping.  Denies ecchymosis or erythema here.  Has never smoked.  Family history of cancer.    4/8/25 patient returns for left posterior tibial tendinitis.  The brace has been helpful.  He is 75% better.  He is no longer limping.  He is getting orthotics in the week.  Denies any new weakness or bruising.  He has no other new complaints.    ROS:  see above     No Known Allergies    Current Outpatient Medications   Medication Sig Dispense Refill     amLODIPine (NORVASC) 5 MG tablet TAKE TWO TABLETS (10 MG) BY MOUTH ONCE DAILY 180 tablet 1     FAMOTIDINE 20 MG PO tablet TAKE TWO TABLETS BY MOUTH ONCE DAILY 60 tablet 0     metoprolol succinate ER (TOPROL XL) 100 MG 24 hr tablet TAKE 1 TABLET (100 MG) BY MOUTH DAILY 90 tablet 2     Multiple Vitamins-Minerals (CENTRUM SILVER ULTRA MENS PO)        nystatin (MYCOSTATIN) 485302 UNIT/GM external cream Apply daily to the groin. Increase to twice daily when rashes are present and self mix with triamcinolone. 60 g 11     omeprazole (PRILOSEC) 40 MG DR capsule TAKE ONE CAPSULE BY MOUTH ONCE DAILY 90 capsule 3     sildenafil  (REVATIO) 20 MG tablet Take 2-3 as needed for ED 20 tablet 0     triamcinolone (KENALOG) 0.025 % external ointment Apply topically 2 times daily To groin rash until resolved. 60 g 5     triamcinolone (KENALOG) 0.1 % external ointment APPLY TOPICALLY TWICE DAILY TO AFFECTED AREAS OF THE RASH IN THE GROIN UP TO ONE WEEK 30 g 0     vitamin D3 (CHOLECALCIFEROL) 1000 units (25 mcg) tablet Take by mouth daily         Patient Active Problem List   Diagnosis     CARDIOVASCULAR SCREENING; LDL GOAL LESS THAN 130     Hypertension goal BP (blood pressure) < 140/90     GERD (gastroesophageal reflux disease)     Seasonal allergic rhinitis     Skin exam, screening for cancer     Multiple benign nevi     Seborrheic keratoses     Skin cancer screening     Morbid obesity (H)     Adenomatous colon polyp       Past Medical History:   Diagnosis Date     Acid reflux      Allergies      HTN (hypertension) 1990     Squamous cell carcinoma      Varicose veins        Past Surgical History:   Procedure Laterality Date     COLONOSCOPY  2001, 2006, 2010    polyp     COLONOSCOPY N/A 12/6/2021    Procedure: COLONOSCOPY, WITH POLYPECTOMY AND BIOPSY;  Surgeon: Michael Antonio MD;  Location: [facility]     COLONOSCOPY N/A 1/27/2025    Procedure: COLONOSCOPY, WITH POLYPECTOMY AND BIOPSY;  Surgeon: Leventhal, Thomas Michael, MD;  Location: [facility]     keratinous cyst  12/09     LIGATE VEIN VARICOSE, PHLEBECTOMY MULTIPLE STAB, COMBINED  1995     MOHS MICROGRAPHIC PROCEDURE       NO HISTORY OF SURGERY  10/24/13    derm       Family History   Problem Relation Age of Onset     Cancer Mother         thyroid     Skin Cancer Father      Breast Cancer Sister      Melanoma No family hx of        Social History     Tobacco Use     Smoking status: Never     Smokeless tobacco: Never   Substance Use Topics     Alcohol use: Yes     Comment: 1-2 beers a week          Exam:  Vitals: There were no vitals taken for this visit.  BMI: There is no height or weight on  file to calculate BMI.  Height: Data Unavailable    Constitutional/ general:  Pt is in no apparent distress, appears well-nourished.  Cooperative with history and physical exam.     Psych:  The patient answered questions appropriately.  Normal affect.  Seems to have reasonable expectations, in terms of treatment.     Eyes:  Visual scanning/ tracking without deficit.     Ears:  Response to auditory stimuli is normal.  No hearing aid devices.  Auricles in proper alignment.     Lymphatic:  Popliteal lymph nodes not enlarged.     Lungs:  Non labored breathing, non labored speech. No cough.  No audible wheezing. Even, quiet breathing.       Vascular:  Pedal pulses are palpable bilaterally for both the DP and PT arteries.  CFT < 3 sec. bilateral ankle edema and varicosities noted.  Pedal hair growth noted.     Neuro:  Alert and oriented x 3. Coordinated gait.  Light touch sensation is intact to the L4, L5, S1 distributions. No obvious deficits.  No evidence of neurological-based weakness, spasticity, or contracture in the lower extremities.    Derm: Normal texture and turgor.  No erythema, ecchymosis, or cyanosis.  No open lesions.     Musculoskeletal:    Lower extremity muscle strength is normal.  Patient is ambulatory without an assistive device or brace.  No gross deformities.  Normal ROM all fore foot and rearfoot joints.  No equinus.  With weightbearing patient has bilateral pronation.  Left very minimal pain with palpation over posterior tibial tendon today.  No pain over tibialis anterior.  No pain with range of motion at TN joint or subtalar joint.  Hard to tell if there is edema in this area because of his significant varicosities and lower extremity edema bilaterally.  Patient can go up on the ball of his foot with minimal pain and good varus of the calcaneus.  Fat plug noted over sinus tarsi.  No history of pain on palpation at this time.    Narrative & Impression   3 views left ankle radiographs 11/22/2021  10:15 AM     History: Pain in joint involving ankle and foot, left     Comparison: No relevant prior.     Findings:     Standing AP, oblique, and lateral  views of the left ankle were  obtained.      No acute osseous abnormality.  Ankle mortise and syndesmosis are  congruent on this non-weight bearing images.     . Small enthesophyte at the origin of plantar fascia. Achilles tendon  insertion is unremarkable.     Mild soft tissue edema about the lower leg and ankle.                                                                      Impression:  1. No acute osseous abnormality.  I have personally reviewed the examination and initial interpretation  and I agree with the findings.         A:    Pronation  Left posterior tibial tendinitis  Chronic sinus tarsi syndrome    P: Patient significantly better today.  Much less pain with exam.  Continue ankle brace until he gets orthotics.  Going forward will wear orthotics in a good shoe.  RTC as needed.      Moisés Shine DPM, FACFAS             Again, thank you for allowing me to participate in the care of your patient.        Sincerely,        Moisés Shine DPM    Electronically signed

## 2025-04-21 DIAGNOSIS — I10 HYPERTENSION GOAL BP (BLOOD PRESSURE) < 140/90: ICD-10-CM

## 2025-04-21 RX ORDER — AMLODIPINE BESYLATE 5 MG/1
10 TABLET ORAL
Qty: 180 TABLET | Refills: 2 | Status: SHIPPED | OUTPATIENT
Start: 2025-04-21

## 2025-04-29 ENCOUNTER — OFFICE VISIT (OUTPATIENT)
Dept: DERMATOLOGY | Facility: CLINIC | Age: 75
End: 2025-04-29
Payer: COMMERCIAL

## 2025-04-29 DIAGNOSIS — L30.4 INTERTRIGO: ICD-10-CM

## 2025-04-29 DIAGNOSIS — Z12.83 SKIN CANCER SCREENING: Primary | ICD-10-CM

## 2025-04-29 DIAGNOSIS — L82.0 INFLAMED SEBORRHEIC KERATOSIS: ICD-10-CM

## 2025-04-29 DIAGNOSIS — L57.0 ACTINIC KERATOSIS: ICD-10-CM

## 2025-04-29 DIAGNOSIS — Z85.828 HISTORY OF NONMELANOMA SKIN CANCER: ICD-10-CM

## 2025-04-29 DIAGNOSIS — B35.3 TINEA PEDIS OF BOTH FEET: ICD-10-CM

## 2025-04-29 DIAGNOSIS — Z86.018 HISTORY OF DYSPLASTIC NEVUS: ICD-10-CM

## 2025-04-29 PROCEDURE — 17000 DESTRUCT PREMALG LESION: CPT | Mod: XS | Performed by: PHYSICIAN ASSISTANT

## 2025-04-29 PROCEDURE — 99213 OFFICE O/P EST LOW 20 MIN: CPT | Mod: 25 | Performed by: PHYSICIAN ASSISTANT

## 2025-04-29 PROCEDURE — 17110 DESTRUCTION B9 LES UP TO 14: CPT | Performed by: PHYSICIAN ASSISTANT

## 2025-04-29 RX ORDER — TRIAMCINOLONE ACETONIDE 1 MG/G
OINTMENT TOPICAL 2 TIMES DAILY
Qty: 30 G | Refills: 0 | Status: SHIPPED | OUTPATIENT
Start: 2025-04-29

## 2025-04-29 RX ORDER — NYSTATIN 100000 U/G
CREAM TOPICAL
Qty: 60 G | Refills: 11 | Status: SHIPPED | OUTPATIENT
Start: 2025-04-29

## 2025-04-29 RX ORDER — TRIAMCINOLONE ACETONIDE 0.25 MG/G
OINTMENT TOPICAL 2 TIMES DAILY
Qty: 60 G | Refills: 5 | Status: SHIPPED | OUTPATIENT
Start: 2025-04-29

## 2025-04-29 NOTE — PROGRESS NOTES
[facility] Dermatology Note  Encounter Date: Apr 29, 2025  Office Visit     Dermatology Problem List:  Last skin check 04/29/2025  1. 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 History of dysplastic nevus (per patient)  3. Onychomycosis/Tinea Pedis  4. Aks- s/p cryotherapy  5. 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 Intertrigo, groin  - Current tx:  triamcinolone 0.025% ointment, nystatin cream  - Past tx: hydrocortisone 2.5% ointment,  7. 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 ISK, s/p cryotherapy  ____________________________________________    Assessment & Plan:    # Hx NMSC.  # History of Dysplastic Nevi. No clinical evidence recurrence.  - No signs of recurrence  - Continue regular skin examinations  - Sun precaution was advised including the use of sun screens of SPF 30 or higher, sun protective clothing, and avoidance of tanning beds.    # Skin cancer screening with multiple benign nevi, solar lentigines  - ABCDEs: Counseled ABCDEs of melanoma: Asymmetry, Border (irregularity), Color (not uniform, changes in color), Diameter (greater than 6 mm which is about the size of a pencil eraser), and Evolving (any changes in preexisting moles).  - Sun protection: Counseled SPF30+  sunscreen, UPF clothing, sun avoidance, tanning bed avoidance.    # Cherry angiomas and seborrheic keratoses,  Benign, reassurance given.     # Intertrigo, groin, stable well controlled, chronic.  No active scaling or erythema noted today.   - Continue nystatin cream daily PRN for flares to the groin.  - Use triamcinolone 0.025% ointment sparingly flares to the groin.   - Discussed triamcinolone 0.1% ointment should not be used on a regular basis in the groin as it can cause skin atrophy, but in appropriate circumstances, it can be used for flares.     # Pilar Cyst, right occipital scalp.   - discussed possibility of referral for excision, patient defers today but will consider    # Tinea Pedis, bl feet.  - patient to use OTC antifungals    # Actinic Keratosis (left temporal scalp)  # Inflamed Seborrheic Keratosis  - cryotherapy performed today, see procedure note below      Procedures Performed:   Cryotherapy procedure note: After verbal consent and discussion of risks and benefits including but no limited to dyspigmentation/scar, blister, and pain, 1 AK 1 ISK was(were) treated with 1-2mm freeze border for 2 cycles with liquid nitrogen. Post cryotherapy instructions were provided.       Follow-up: 6 month(s) in-person, or earlier for new or changing lesions    Staff and Scribe:   Scribe Disclosure:   By signing my name below, I, Chey Manriquez, attest that this documentation has been prepared under the direction and in the presence of Imelda Rincon PA-C.  - Electronically Signed: Chey Manriquez 04/29/25       Provider Disclosure:  I agree with above History, Review of Systems, Physical exam and Plan.  I have reviewed the content of the documentation and have edited it as needed. I have personally performed the services documented here and the documentation accurately represents those services and the decisions I have made.      Electronically signed by:  All risks, benefits and alternatives were discussed with  patient.  Patient is in agreement and understands the assessment and plan.  All questions were answered.  Sun Screen Education was given.   Return to Clinic in 6 months or sooner as needed.   Imelda Rincon PA-C      ____________________________________________    CC: Skin Check    HPI:  Mr. Juan Jose Razo is a(n) 74 year old male who presents today as a return patient for skin check.    Patient recently injured his head on his car. Patient reports intermittent itching rashes and small red areas that he will apply triamcinolone ointment to treat.    Patient is otherwise feeling well, without additional skin concerns.    Labs Reviewed:  Dermatopathology from 10/22/2024  Final Diagnosis   Left preauricular cheek:  - Seborrheic keratosis, inflamed       Physical exam:  Vitals: There were no vitals taken for this visit.  GEN: This is a well developed, well-nourished male in no acute distress, in a pleasant mood.    SKIN: Full skin, which includes the head/face, both arms, chest, back, abdomen,both legs, genitalia and/or groin buttocks, digits and/or nails, was examined.  - There is a tan to brown waxy stuck on papule with surrounding erythema on the L lower temporal scalp.   - There is an erythematous macule with overyling adherent scale on the L temporal scalp.  - R occipital scalp 1 cm dome shaped papule  - There are dome shaped bright red papules on the head/neck, trunk, extremities.   - Multiple regular brown pigmented macules and papules are identified on the head/neck, trunk, extremities.   - Scattered brown macules on sun exposed areas.  - faint erythema noted on inguinal folds  - interdigital scale noted between 4th and 5th digits on bilateral feet  - There are waxy stuck on tan to brown papules on the head/neck, trunk, extremities.  - No other lesions of concern on areas examined.           Medications:  Current Outpatient Medications   Medication Sig Dispense Refill    amLODIPine (NORVASC) 5 MG tablet  TAKE TWO TABLETS BY MOUTH ONCE DAILY 180 tablet 2    FAMOTIDINE 20 MG PO tablet TAKE TWO TABLETS BY MOUTH ONCE DAILY 60 tablet 0    metoprolol succinate ER (TOPROL XL) 100 MG 24 hr tablet TAKE 1 TABLET (100 MG) BY MOUTH DAILY 90 tablet 2    Multiple Vitamins-Minerals (CENTRUM SILVER ULTRA MENS PO)       nystatin (MYCOSTATIN) 212197 UNIT/GM external cream Apply daily to the groin. Increase to twice daily when rashes are present and self mix with triamcinolone. 60 g 11    omeprazole (PRILOSEC) 40 MG DR capsule TAKE ONE CAPSULE BY MOUTH ONCE DAILY 90 capsule 3    sildenafil (REVATIO) 20 MG tablet Take 2-3 as needed for ED 20 tablet 0    triamcinolone (KENALOG) 0.025 % external ointment Apply topically 2 times daily To groin rash until resolved. 60 g 5    triamcinolone (KENALOG) 0.1 % external ointment APPLY TOPICALLY TWICE DAILY TO AFFECTED AREAS OF THE RASH IN THE GROIN UP TO ONE WEEK 30 g 0    vitamin D3 (CHOLECALCIFEROL) 1000 units (25 mcg) tablet Take by mouth daily       No current facility-administered medications for this visit.      Past Medical History:   Patient Active Problem List   Diagnosis    CARDIOVASCULAR SCREENING; LDL GOAL LESS THAN 130    Hypertension goal BP (blood pressure) < 140/90    GERD (gastroesophageal reflux disease)    Seasonal allergic rhinitis    Skin exam, screening for cancer    Multiple benign nevi    Seborrheic keratoses    Skin cancer screening    Morbid obesity (H)    Adenomatous colon polyp     Past Medical History:   Diagnosis Date    Acid reflux     Allergies     HTN (hypertension) 1990    Squamous cell carcinoma     Varicose veins         ESTEBAN Rincon PA-C  [address] [place] [id number] on close of this encounter.

## 2025-04-29 NOTE — LETTER
4/29/2025      Juan Jose Razo  [address] [facility] [id number]      Dear Colleague,    Thank you for referring your patient, Juan Jose Razo, to the [facility]. Please see a copy of my visit note below.    [facility] Dermatology Note  Encounter Date: Apr 29, 2025  Office Visit     Dermatology Problem List:  Last skin check 04/29/2025  1. 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 History of dysplastic nevus (per patient)  3. Onychomycosis/Tinea Pedis  4. Aks- s/p cryotherapy  5. 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 Intertrigo, groin  - Current tx:  triamcinolone 0.025% ointment, nystatin cream  - Past tx: hydrocortisone 2.5% ointment,  7. 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 ISK, s/p cryotherapy  ____________________________________________    Assessment & Plan:    # Hx NMSC.  # History of Dysplastic Nevi. No clinical evidence recurrence.  - No signs of recurrence  - Continue regular skin examinations  - Sun precaution was advised including the use of sun screens of SPF 30 or higher, sun protective clothing, and avoidance of tanning beds.    # Skin cancer screening with multiple benign nevi, solar lentigines  - ABCDEs: Counseled  ABCDEs of melanoma: Asymmetry, Border (irregularity), Color (not uniform, changes in color), Diameter (greater than 6 mm which is about the size of a pencil eraser), and Evolving (any changes in preexisting moles).  - Sun protection: Counseled SPF30+ sunscreen, UPF clothing, sun avoidance, tanning bed avoidance.    # Cherry angiomas and seborrheic keratoses,  Benign, reassurance given.     # Intertrigo, groin, stable well controlled, chronic.  No active scaling or erythema noted today.   - Continue nystatin cream daily PRN for flares to the groin.  - Use triamcinolone 0.025% ointment sparingly flares to the groin.   - Discussed triamcinolone 0.1% ointment should not be used on a regular basis in the groin as it can cause skin atrophy, but in appropriate circumstances, it can be used for flares.     # Pilar Cyst, right occipital scalp.   - discussed possibility of referral for excision, patient defers today but will consider    # Tinea Pedis, bl feet.  - patient to use OTC antifungals    # Actinic Keratosis (left temporal scalp)  # Inflamed Seborrheic Keratosis  - cryotherapy performed today, see procedure note below      Procedures Performed:   Cryotherapy procedure note: After verbal consent and discussion of risks and benefits including but no limited to dyspigmentation/scar, blister, and pain, 1 AK 1 ISK was(were) treated with 1-2mm freeze border for 2 cycles with liquid nitrogen. Post cryotherapy instructions were provided.       Follow-up: 6 month(s) in-person, or earlier for new or changing lesions    Staff and Scribe:   Scribe Disclosure:   By signing my name below, I, Chey Manriquez, attest that this documentation has been prepared under the direction and in the presence of Imelda Rincon PA-C.  - Electronically Signed: Chey Manriquez 04/29/25       Provider Disclosure:  I agree with above History, Review of Systems, Physical exam and Plan.  I have reviewed the content of the documentation and have  edited it as needed. I have personally performed the services documented here and the documentation accurately represents those services and the decisions I have made.      Electronically signed by:  All risks, benefits and alternatives were discussed with patient.  Patient is in agreement and understands the assessment and plan.  All questions were answered.  Sun Screen Education was given.   Return to Clinic in 6 months or sooner as needed.   Imelda Rincon PA-C      ____________________________________________    CC: Skin Check    HPI:  Mr. Juan Jose Razo is a(n) 74 year old male who presents today as a return patient for skin check.    Patient recently injured his head on his car. Patient reports intermittent itching rashes and small red areas that he will apply triamcinolone ointment to treat.    Patient is otherwise feeling well, without additional skin concerns.    Labs Reviewed:  Dermatopathology from 10/22/2024  Final Diagnosis   Left preauricular cheek:  - Seborrheic keratosis, inflamed       Physical exam:  Vitals: There were no vitals taken for this visit.  GEN: This is a well developed, well-nourished male in no acute distress, in a pleasant mood.    SKIN: Full skin, which includes the head/face, both arms, chest, back, abdomen,both legs, genitalia and/or groin buttocks, digits and/or nails, was examined.  - There is a tan to brown waxy stuck on papule with surrounding erythema on the L lower temporal scalp.   - There is an erythematous macule with overyling adherent scale on the L temporal scalp.  - R occipital scalp 1 cm dome shaped papule  - There are dome shaped bright red papules on the head/neck, trunk, extremities.   - Multiple regular brown pigmented macules and papules are identified on the head/neck, trunk, extremities.   - Scattered brown macules on sun exposed areas.  - faint erythema noted on inguinal folds  - interdigital scale noted between 4th and 5th digits on bilateral feet  -  There are waxy stuck on tan to brown papules on the head/neck, trunk, extremities.  - No other lesions of concern on areas examined.           Medications:  Current Outpatient Medications   Medication Sig Dispense Refill     amLODIPine (NORVASC) 5 MG tablet TAKE TWO TABLETS BY MOUTH ONCE DAILY 180 tablet 2     FAMOTIDINE 20 MG PO tablet TAKE TWO TABLETS BY MOUTH ONCE DAILY 60 tablet 0     metoprolol succinate ER (TOPROL XL) 100 MG 24 hr tablet TAKE 1 TABLET (100 MG) BY MOUTH DAILY 90 tablet 2     Multiple Vitamins-Minerals (CENTRUM SILVER ULTRA MENS PO)        nystatin (MYCOSTATIN) 171558 UNIT/GM external cream Apply daily to the groin. Increase to twice daily when rashes are present and self mix with triamcinolone. 60 g 11     omeprazole (PRILOSEC) 40 MG DR capsule TAKE ONE CAPSULE BY MOUTH ONCE DAILY 90 capsule 3     sildenafil (REVATIO) 20 MG tablet Take 2-3 as needed for ED 20 tablet 0     triamcinolone (KENALOG) 0.025 % external ointment Apply topically 2 times daily To groin rash until resolved. 60 g 5     triamcinolone (KENALOG) 0.1 % external ointment APPLY TOPICALLY TWICE DAILY TO AFFECTED AREAS OF THE RASH IN THE GROIN UP TO ONE WEEK 30 g 0     vitamin D3 (CHOLECALCIFEROL) 1000 units (25 mcg) tablet Take by mouth daily       No current facility-administered medications for this visit.      Past Medical History:   Patient Active Problem List   Diagnosis     CARDIOVASCULAR SCREENING; LDL GOAL LESS THAN 130     Hypertension goal BP (blood pressure) < 140/90     GERD (gastroesophageal reflux disease)     Seasonal allergic rhinitis     Skin exam, screening for cancer     Multiple benign nevi     Seborrheic keratoses     Skin cancer screening     Morbid obesity (H)     Adenomatous colon polyp     Past Medical History:   Diagnosis Date     Acid reflux      Allergies      HTN (hypertension) 1990     Squamous cell carcinoma      Varicose veins         ESTEBAN Rincon, SANJUANITA  [address] [place] [address]  [place]SIE BHAT [id number] on close of this encounter.      Again, thank you for allowing me to participate in the care of your patient.        Sincerely,        Imelda Rincon PA-C    Electronically signed

## 2025-04-29 NOTE — PATIENT INSTRUCTIONS
Checking for Skin Cancer  You can help find cancer early by checking your skin each month. There are 3 main kinds of skin cancer: melanoma, basal cell carcinoma, and squamous cell carcinoma. Doing monthly skin checks is the best way to find new marks, sores, or skin changes. Follow these instructions for checking your skin.   The ABCDEs of checking moles for melanoma   Check your moles or growths for signs of melanoma using ABCDE:   Asymmetry: The sides of the mole or growth don t match.  Border: The edges are ragged, notched, or blurred.  Color: The color within the mole or growth varies. It could be black, brown, tan, white, or shades of red, gray, or blue.  Diameter: The mole or growth is larger than   inch or 6 mm (size of a pencil eraser).  Evolving: The size, shape, texture, or color of the mole or growth is changing.     ABCDE's of moles on light skin.        ABCDE's of moles on dark skin may be harder to identify.     Checking for other types of skin cancer  Basal cell carcinoma or squamous cell carcinoma cause symptoms like:     A spot or mole that looks different from all other marks on your skin  Changes in how an area feels, such as itching, tenderness, or pain  Changes in the skin's surface, such as oozing, bleeding, or scaliness  A sore that doesn't heal  New swelling, redness, or spread of color beyond the border of a mole    Who s at risk?  Anyone of any skin color can get skin cancer. But you're at greater risk if you have:   Fair skin that freckles easily and burns instead of tanning  Light-colored or red hair  Light-colored eyes  Many moles or abnormal moles on your skin  A long history of unprotected exposure to sunlight or tanning beds  A history of many blistering sunburns as a child or teen  A family history of skin cancer  Been exposed to radiation or chemicals  A weakened immune system  Been exposed to arsenic  If you've had skin cancer in the past, you're at high risk of having it again.    How to check your skin  Do your monthly skin checkups in front of a full-length mirror. Use a room with good lighting so it's easier to see. Use a hand mirror to look at hard-to-see places like your buttocks and back. You can also have a trusted friend or family member help you with these checks. Check every part of your body, including your:   Head (ears, face, neck, and scalp)  Torso (front, back, sides, and under breasts)  Arms (tops, undersides, and armpits)  Hands (palms, backs, and fingers, including under the nails)  Lower back, buttocks, and genitals  Legs (front, back, and sides)  Feet (tops, soles, toes, including under the nails, and between toes)  Watch for new spots on your skin or a spot that's changing in color, shape, size.   If you have a lot of moles, take digital photos of them each month. Make sure to take photos both up close and from a distance. These can help you see if any moles change over time.   Know your skin  Most skin changes aren't cancer. But if you see any changes in your skin, call your healthcare provider right away. Only they can tell you if a change is a problem. If you have skin cancer, seeing your provider can be the first step to getting the treatment that could save your life.   Debra last reviewed this educational content on 10/1/2021    5255-2550 [employer]. All rights reserved. This information is not intended as a substitute for professional medical care. Always follow your healthcare professional's instructions.     Cryotherapy    What is it?  Use of a very cold liquid, such as liquid nitrogen, to freeze and destroy abnormal skin cells that need to be removed    What should I expect?  Tenderness and redness  A small blister that might grow and fill with dark purple blood. There may be crusting.  More than one treatment may be needed if the lesions do not go away.    How do I care for the treated area?  Gently wash the area with your hands when  bathing.  Use a thin layer of Vaseline to help with healing. You may use a Band-Aid.   The area should heal within 7-10 days and may leave behind a pink or lighter color.   Do not use an antibiotic or Neosporin ointment.   You may take acetaminophen (Tylenol) for pain.     Call your doctor if you have:  Severe pain  Signs of infection (warmth, redness, cloudy yellow drainage, and or a bad smell)  Questions or concerns    Who should I call with questions?      [facility]: [phone]      [facility]: [phone]      For urgent needs outside of business hours call the [facility] at [phone] and ask for the dermatology resident on call

## 2025-06-10 ENCOUNTER — OFFICE VISIT (OUTPATIENT)
Dept: DERMATOLOGY | Facility: CLINIC | Age: 75
End: 2025-06-10
Payer: COMMERCIAL

## 2025-06-10 DIAGNOSIS — L72.9 CYST OF SKIN: ICD-10-CM

## 2025-06-10 RX ORDER — OMEGA-3/DHA/EPA/FISH OIL 60 MG-90MG
2 CAPSULE ORAL DAILY
COMMUNITY
Start: 2025-03-10

## 2025-06-10 NOTE — PATIENT INSTRUCTIONS
Caring for your skin after surgery    For the first 48 hours after your surgery:  Leave the pressure dressing on and keep it dry. If it should come loose, you may re-tape it, but do not take it off.  Relax and take it easy.  Do not do any vigorous exercise, heavy lifting or bending forward. This could cause the wound to bleed.  If the wound is on your head, sleeping with your head elevated for the first few nights will help with swelling and bleeding. (Use linens/pillow cases that would be ok to get blood on in the event there is some oozing from the bandage).  Post-operative pain is usually mild.  You may alternate between 1000 mg of Tylenol (acetaminophen) and 400 mg of Ibuprofen every 4 hours.  This means, for example, that you could take the followin,000 mg of Tylenol, followed 4 hours later by 400 mg Ibuprofen, followed 4 hours later with 1,000 mg of Tylenol, and so forth.  Do not take more than 4,000 mg of acetaminophen in a 24-hour period or 3200 mg of Ibuprofen in a 24-hr period.    Avoid alcohol and vitamin E as these may increase your tendency to bleed.  Apply an ice pack for 20 min every 2-3 hours while awake.  This may help reduce swelling, bruising, and pain.  Make sure the ice pack is waterproof so that the pressure bandage doesn't get wet.  You may see a small amount of drainage or blood on your pressure bandage. This is normal. However:  If drainage or bleeding continues or saturates the bandage, you will need to apply firm pressure over the bandage with a clean washcloth for 15 minutes.    Remove the saturated bandage.  If bleeding has stopped, apply Vaseline over the suture line and cover with a non-stick bandage.  To add some pressure over the wound, fold a piece of gauze and tape over the area.  If bleeding continues after applying pressure for 15 minutes, apply an ice pack with gentle pressure to the bandaged area for another 15 minutes.  If bleeding still continues, call our office or go to  the nearest emergency room.    48 Hours After Surgery:  Remove the bandage.  If it seems sticky or too difficult to get off, you may need to soak it off in the shower.  Wash wound with a mild soap and water.  Use caution when washing the wound, be gentle and do not let the forceful shower stream hit the wound directly.  Pat dry.  Apply Vaseline or Aquaphor ointment (from a new container or tube) over the suture line with a Q-tip.  Cover the site with a bandage.  Do this daily until the sutures have been removed or dissolved.    What to expect:  The first couple of days your wound may be tender and may bleed slightly when doing wound care.  There may be swelling and bruising around the wound, especially if it is near the eyes. For your comfort, you may apply ice or cold compresses to the area.  The area around your wound may be numb for several weeks or even months.  You may experience periodic sharp pain or mild itching around the wound as it heals.    Call Us If:  You have bleeding that will not stop after applying pressure and ice.  You have pain that is not controlled with Tylenol and Ibuprofen.  You have signs or symptoms of an infection such as fever over 100 degrees Fahrenheit, redness, swelling, or warmth spreading from the wound, increasing pain after the first 48 hours, or white/yellow/green drainage from the wound (may or may not have a foul odor).    [facility], Dermatology [phone].  Available M- 8-5.  For urgent needs outside of business hours, call the [facility] at [phone] and ask to speak with/page the dermatology resident on call.     Proper skin care from [facility]:    -Eliminate harsh soaps as they strip the natural oils from the skin, often resulting in dry itchy skin ( i.e. Dial, Zest, Kendy Spring)  -Use mild soaps such as Cetaphil or Dove Sensitive Skin in the shower. You do not need to use soap on arms, legs, and trunk every time you  shower unless visibly soiled.   -Avoid hot or cold showers.  -After showering, lightly dry off and apply moisturizing within 2-3 minutes. This will help trap moisture in the skin.   -Aggressive use of a moisturizer at least 1-2 times a day to the entire body (including -Vanicream, Cetaphil, Aquaphor or Cerave) and moisturize hands after every washing.  -We recommend using moisturizers that come in a tub that needs to be scooped out, not a pump. This has more of an oil base. It will hold moisture in your skin much better than a water base moisturizer. The above recommended are non-pore clogging.      Wear a sunscreen with at least SPF 30 on your face, ears, neck and V of the chest daily. Wear sunscreen on other areas of the body if those areas are exposed to the sun throughout the day. Sunscreens can contain physical and/or chemical blockers. Physical blockers are less likely to clog pores, these include zinc oxide and titanium dioxide. Reapply every two hour and after swimming.     Sunscreen examples: https://www.ewg.org/sunscreen/    UV radiation  UVA radiation remains constant throughout the day and throughout the year. It is a longer wavelength than UVB and therefore penetrates deeper into the skin leading to immediate and delayed tanning, photoaging, and skin cancer. 70-80% of UVA and UVB radiation occurs between the hours of 10am-2pm.  UVB radiation  UVB radiation causes the most harmful effects and is more significant during the summer months. However, snow and ice can reflect UVB radiation leading to skin damage during the winter months as well. UVB radiation is responsible for tanning, burning, inflammation, delayed erythema (pinkness), pigmentation (brown spots), and skin cancer.     I recommend self monthly full body exams and yearly full body exams with a dermatology provider. If you develop a new or changing lesion please follow up for examination. Most skin cancers are pink and scaly or pink and pearly.  However, we do see blue/brown/black skin cancers.  Consider the ABCDEs of melanoma when giving yourself your monthly full body exam ( don't forget the groin, buttocks, feet, toes, etc). A-asymmetry, B-borders, C-color, D-diameter, E-elevation or evolving. If you see any of these changes please follow up in clinic. If you cannot see your back I recommend purchasing a hand held mirror to use with a larger wall mirror.       Checking for Skin Cancer  You can find cancer early by checking your skin each month. There are 3 kinds of skin cancer. They are melanoma, basal cell carcinoma, and squamous cell carcinoma. Doing monthly skin checks is the best way to find new marks or skin changes. Follow the instructions below for checking your skin.   The ABCDEs of checking moles for melanoma   Check your moles or growths for signs of melanoma using ABCDE:   Asymmetry: the sides of the mole or growth don t match  Border: the edges are ragged, notched, or blurred  Color: the color within the mole or growth varies  Diameter: the mole or growth is larger than 6 mm (size of a pencil eraser)  Evolving: the size, shape, or color of the mole or growth is changing (evolving is not shown in the images below)    Checking for other types of skin cancer  Basal cell carcinoma or squamous cell carcinoma have symptoms such as:     A spot or mole that looks different from all other marks on your skin  Changes in how an area feels, such as itching, tenderness, or pain  Changes in the skin's surface, such as oozing, bleeding, or scaliness  A sore that does not heal  New swelling or redness beyond the border of a mole    Who s at risk?  Anyone can get skin cancer. But you are at greater risk if you have:   Fair skin, light-colored hair, or light-colored eyes  Many moles or abnormal moles on your skin  A history of sunburns from sunlight or tanning beds  A family history of skin cancer  A history of exposure to radiation or chemicals  A weakened  immune system  If you have had skin cancer in the past, you are at risk for recurring skin cancer.   How to check your skin  Do your monthly skin checkups in front of a full-length mirror. Check all parts of your body, including your:   Head (ears, face, neck, and scalp)  Torso (front, back, and sides)  Arms (tops, undersides, upper, and lower armpits)  Hands (palms, backs, and fingers, including under the nails)  Buttocks and genitals  Legs (front, back, and sides)  Feet (tops, soles, toes, including under the nails, and between toes)  If you have a lot of moles, take digital photos of them each month. Make sure to take photos both up close and from a distance. These can help you see if any moles change over time.   Most skin changes are not cancer. But if you see any changes in your skin, call your doctor right away. Only he or she can diagnose a problem. If you have skin cancer, seeing your doctor can be the first step toward getting the treatment that could save your life.   [employer] last reviewed this educational content on 4/1/2019 2000-2020 [employer]. [address] [place] [address]. All rights reserved. This information is not intended as a substitute for professional medical care. Always follow your healthcare professional's instructions.       When should I call my doctor?  If you are worsening or not improving, please, contact us or seek urgent care as noted below.     Who should I call with questions (adults)?    [facility] [phone]  For urgent needs outside of business hours call the [facility] at [phone] and ask for the dermatology resident on call to be paged  If this is a medical emergency and you are unable to reach an ER, Call [phone]      If you need a prescription refill, please contact your pharmacy. Refills are approved or denied by our Physicians during normal business hours, Monday through Friday.  Per office policy,  refills will not be granted if you have not been seen within the past year (or sooner depending on the condition).

## 2025-06-10 NOTE — LETTER
6/10/2025      Juan Jose Razo  [address] [id number]      Dear Colleague,    Thank you for referring your patient, Juan Jose Razo, to the [facility]. Please see a copy of my visit note below.    [facility] Dermatology Note  Encounter Date: [place] 10, 2025  Office Visit     Dermatology Problem List:  Last skin check 04/29/2025    1. 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 History of dysplastic nevus (per patient)  3. Onychomycosis/Tinea Pedis  4. Aks- s/p cryotherapy  5. 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 Intertrigo, groin  - Current tx:  triamcinolone 0.025% ointment, nystatin cream  - Past tx: hydrocortisone 2.5% ointment,  7. 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 ISK, s/p cryotherapy  9. Pilar cyst, R occipital scalp   - s/p excision 06/10/2025  ____________________________________________    Assessment & Plan:     # Pilar Cyst, right occipital scalp.   - Removed today in office. See separate procedure note.     Procedures Performed:   See separate procedure note.     Follow-up: 6 month(s) in-person, or earlier for new or changing lesions    Staff and Scribe:     Scribe Disclosure:   Claudia SHAH, am serving as  a scribe; to document services personally performed by Imelda Rincon PA-C -based on data collection and the provider's statements to me.     Provider Disclosure:   The documentation recorded by the scribe accurately reflects the services I personally performed and the decisions made by me.    All risks, benefits and alternatives were discussed with patient.  Patient is in agreement and understands the assessment and plan.  All questions were answered.  Sun Screen Education was given.   Return to Clinic in 6 months or sooner as needed.   Imelda Rincon PA-C   [facility]        ____________________________________________    CC: Procedure (Right Occipital pilar cyst removal)    HPI:  Mr. Juan Jose Razo is a(n) 74 year old male who presents today as a return patient for pilar cyst removal. Last seen in dermatology by me on 04/29/2025, at which time he had a FBSC, it was recommended he continue triamcinolone 0.025% and nystatin to treat intertrigo, and he had cryotherapy performed on an AK and ISK.     Patient is otherwise feeling well, without additional skin concerns.    Labs Reviewed:  N/A    Physical exam:  Vitals: There were no vitals taken for this visit.  GEN: This is a well developed, well-nourished male in no acute distress, in a pleasant mood.    SKIN: Focused examination of the scalp was performed.  - There is a raised dome shaped 1.5cm nodule with a central punctum located on R occipital scalp.  - No other lesions of concern on areas examined.     Medications:  Current Outpatient Medications   Medication Sig Dispense Refill     amLODIPine (NORVASC) 5 MG tablet TAKE TWO TABLETS BY MOUTH ONCE DAILY 180 tablet 2     FAMOTIDINE 20 MG PO tablet TAKE TWO TABLETS BY MOUTH ONCE DAILY 60 tablet 0     fish oil-omega-3 fatty acids 500 MG capsule Take 2 capsules by mouth daily. Pro omega  650mg EPA & 450 mg DHA       metoprolol succinate ER (TOPROL XL) 100 MG 24 hr tablet  TAKE 1 TABLET (100 MG) BY MOUTH DAILY 90 tablet 2     Multiple Vitamins-Minerals (CENTRUM SILVER ULTRA MENS PO)        nystatin (MYCOSTATIN) 703849 UNIT/GM external cream Apply daily to the groin. Increase to twice daily when rashes are present and self mix with triamcinolone. 60 g 11     omeprazole (PRILOSEC) 40 MG DR capsule TAKE ONE CAPSULE BY MOUTH ONCE DAILY 90 capsule 3     triamcinolone (KENALOG) 0.025 % external ointment Apply topically 2 times daily. To groin rash until resolved. 60 g 5     triamcinolone (KENALOG) 0.1 % external ointment Apply topically 2 times daily. 30 g 0     vitamin D3 (CHOLECALCIFEROL) 1000 units (25 mcg) tablet Take by mouth daily       sildenafil (REVATIO) 20 MG tablet Take 2-3 as needed for ED (Patient not taking: Reported on 6/10/2025) 20 tablet 0     No current facility-administered medications for this visit.      Past Medical History:   Patient Active Problem List   Diagnosis     CARDIOVASCULAR SCREENING; LDL GOAL LESS THAN 130     Hypertension goal BP (blood pressure) < 140/90     GERD (gastroesophageal reflux disease)     Seasonal allergic rhinitis     Skin exam, screening for cancer     Multiple benign nevi     Seborrheic keratoses     Skin cancer screening     Morbid obesity (H)     Adenomatous colon polyp     Past Medical History:   Diagnosis Date     Acid reflux      Allergies      HTN (hypertension) 1990     Squamous cell carcinoma      Varicose veins         CC Imelda Rincon PA-C  [address] [place] [id number] on close of this encounter.      Again, thank you for allowing me to participate in the care of your patient.        Sincerely,        Imelda Rincon PA-C    Electronically signed

## 2025-06-10 NOTE — PROGRESS NOTES
[facility] Dermatology Note  Encounter Date: [place] 10, 2025  Office Visit     Dermatology Problem List:  Last skin check 04/29/2025    1. 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 History of dysplastic nevus (per patient)  3. Onychomycosis/Tinea Pedis  4. Aks- s/p cryotherapy  5. 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 Intertrigo, groin  - Current tx:  triamcinolone 0.025% ointment, nystatin cream  - Past tx: hydrocortisone 2.5% ointment,  7. 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 ISK, s/p cryotherapy  9. Pilar cyst, R occipital scalp   - s/p excision 06/10/2025  ____________________________________________    Assessment & Plan:     # Pilar Cyst, right occipital scalp.   - Removed today in office. See separate procedure note.     Procedures Performed:   See separate procedure note.     Follow-up: 6 month(s) in-person, or earlier for new or changing lesions    Staff and Scribe:     Scribe Disclosure:   I, Claudia Cleary, am serving as a scribe; to document services personally performed by Imelda Rincon PA-C -based on data collection and the provider's statements to me.     Provider Disclosure:   The documentation recorded by the scribe accurately reflects the services I  personally performed and the decisions made by me.    All risks, benefits and alternatives were discussed with patient.  Patient is in agreement and understands the assessment and plan.  All questions were answered.  Sun Screen Education was given.   Return to Clinic in 6 months or sooner as needed.   Imelda Rincon PA-C   [facility]        ____________________________________________    CC: Procedure (Right Occipital pilar cyst removal)    HPI:  Mr. Juan Jose Razo is a(n) 74 year old male who presents today as a return patient for pilar cyst removal. Last seen in dermatology by me on 04/29/2025, at which time he had a FBSC, it was recommended he continue triamcinolone 0.025% and nystatin to treat intertrigo, and he had cryotherapy performed on an AK and ISK.     Patient is otherwise feeling well, without additional skin concerns.    Labs Reviewed:  N/A    Physical exam:  Vitals: There were no vitals taken for this visit.  GEN: This is a well developed, well-nourished male in no acute distress, in a pleasant mood.    SKIN: Focused examination of the scalp was performed.  - There is a raised dome shaped 1.5cm nodule with a central punctum located on R occipital scalp.  - No other lesions of concern on areas examined.     Medications:  Current Outpatient Medications   Medication Sig Dispense Refill    amLODIPine (NORVASC) 5 MG tablet TAKE TWO TABLETS BY MOUTH ONCE DAILY 180 tablet 2    FAMOTIDINE 20 MG PO tablet TAKE TWO TABLETS BY MOUTH ONCE DAILY 60 tablet 0    fish oil-omega-3 fatty acids 500 MG capsule Take 2 capsules by mouth daily. Pro omega  650mg EPA & 450 mg DHA      metoprolol succinate ER (TOPROL XL) 100 MG 24 hr tablet TAKE 1 TABLET (100 MG) BY MOUTH DAILY 90 tablet 2    Multiple Vitamins-Minerals (CENTRUM SILVER ULTRA MENS PO)       nystatin (MYCOSTATIN) 623730 UNIT/GM external cream Apply daily to the groin. Increase to twice daily when rashes are present and self mix  with triamcinolone. 60 g 11    omeprazole (PRILOSEC) 40 MG DR capsule TAKE ONE CAPSULE BY MOUTH ONCE DAILY 90 capsule 3    triamcinolone (KENALOG) 0.025 % external ointment Apply topically 2 times daily. To groin rash until resolved. 60 g 5    triamcinolone (KENALOG) 0.1 % external ointment Apply topically 2 times daily. 30 g 0    vitamin D3 (CHOLECALCIFEROL) 1000 units (25 mcg) tablet Take by mouth daily      sildenafil (REVATIO) 20 MG tablet Take 2-3 as needed for ED (Patient not taking: Reported on 6/10/2025) 20 tablet 0     No current facility-administered medications for this visit.      Past Medical History:   Patient Active Problem List   Diagnosis    CARDIOVASCULAR SCREENING; LDL GOAL LESS THAN 130    Hypertension goal BP (blood pressure) < 140/90    GERD (gastroesophageal reflux disease)    Seasonal allergic rhinitis    Skin exam, screening for cancer    Multiple benign nevi    Seborrheic keratoses    Skin cancer screening    Morbid obesity (H)    Adenomatous colon polyp     Past Medical History:   Diagnosis Date    Acid reflux     Allergies     HTN (hypertension) 1990    Squamous cell carcinoma     Varicose veins         ESTEBAN Rincon PA-C  [address] [place] [address] on close of this encounter.

## 2025-06-10 NOTE — PROCEDURES
DERMATOLOGY EXCISION PROCEDURE NOTE    Dermatology Problem List:  1. 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 History of dysplastic nevus (per patient)  3. Onychomycosis/Tinea Pedis  4. Aks- s/p cryotherapy  5. 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 Intertrigo, groin  - Current tx:  triamcinolone 0.025% ointment, nystatin cream  - Past tx: hydrocortisone 2.5% ointment,  7. 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 ISK, s/p cryotherapy  9. Pilar cyst, R occipital scalp   - s/p excision 06/10/2025    NAME OF PROCEDURE: Excision intermediate layered linear closure  Staff surgeon: Imelda Rincon PA-C   Scrub Nurse: Clementina Ram CMA    PRE-OPERATIVE DIAGNOSIS:  cyst  POST-OPERATIVE DIAGNOSIS: Same   LOCATION: R occipital scalp   FINAL EXCISION SIZE(DEFECT SIZE): 3 cm  MARGIN: 3 mm  FINAL REPAIR LENGTH: 4 cm   ANESTHESIA:  1% lidocaine with 1:100,000 epinephrine    INDICATIONS: This patient presented with a 1.5 cm cyst. Excision was indicated. We discussed the principles of treatment and most likely complications including scarring, bleeding, infection, incomplete excision, wound dehiscence, pain, nerve damage, and recurrence. Informed consent was obtained and the patient underwent the procedure as  follows:    PROCEDURE: The patient was taken to the operative suite. Time-out was performed.  The treatment area was anesthetized with 1% lidocaine with epinephrine. The area was prepped with Chlorhexidine and rinsed with sterile saline and draped with sterile towels. The lesion was delineated and excised down to subcutaneous fat in a elliptical manner. Hemostasis was obtained by electrocoagulation.     REPAIR: An intermediate layered linear closure was selected as the procedure which would maximally preserve both function and cosmesis.    After the excision of the tumor, the area was carefully undermined. Hemostasis was obtained with  electrocoagulation.  Closure was oriented so that the wound was in the patient's natural skin tension lines. Deep layering suturing was performed (subcutaneous and dermal layers) and closed  with 4-0 Monocryl sutures. The epidermis was then carefully approximated along the length of the wound using  prolene interrupted sutures.     Estimated blood loss was less than 10 ml for all surgical sites. A sterile pressure dressing was applied and wound care instructions, with a written handout, were given. The [facility] and [facility]ulatory.      Follow-up in 7-10 weeks for suture removal.      Anatomic Pathology Results: pending    Clinical Follow-Up: 6 months    Staff Involved:  Staff Only      All risk, benefits and alternatives were discussed with patient.  Patient is in agreement and understands the assessment and plan.  All questions were answered.  Sun Screen Education was given.   Return to Clinic in 6 months or sooner as needed.   Imelda Rincon PA-C

## 2025-06-12 ENCOUNTER — RESULTS FOLLOW-UP (OUTPATIENT)
Dept: DERMATOLOGY | Facility: CLINIC | Age: 75
End: 2025-06-12

## 2025-06-24 NOTE — TELEPHONE ENCOUNTER
I called patient today 12/07/20 at 12:05 am to see if he would like to reorder cpap supplies. No answer left message for him to call our [facility] location to renew pap supplies. Left [facility] office number for him.    N/A

## 2025-07-15 NOTE — PATIENT INSTRUCTIONS
Wound Care After a Biopsy    What is a skin biopsy?  A skin biopsy allows the doctor to examine a very small piece of tissue under the microscope to determine the diagnosis and the best treatment for the skin condition. A local anesthetic (numbing medicine) is injected with a very small needle into the skin area to be tested. A small piece of skin is taken from the area. Sometimes a suture (stitch) is used.     What are the risks of a skin biopsy?  I will experience scar, bleeding, swelling, pain, crusting and redness. I may experience incomplete removal or recurrence. Risks of this procedure are excessive bleeding, bruising, infection, nerve damage, numbness, thick (hypertrophic or keloidal) scar and non-diagnostic biopsy.    How should I care for my wound for the first 24 hours?  Keep the wound dry and covered for 24 hours  If it bleeds, hold direct pressure on the area for 15 minutes. If bleeding does not stop, call us or go to the emergency room  Avoid strenuous exercise the first 1-2 days or as your doctor instructs you    How should I care for the wound after 24 hours?  After 24 hours, remove the bandage  You may bathe or shower as normal  If you had a scalp biopsy, you can shampoo as usual and can use shower water to clean the biopsy site daily  Clean the wound once a day with gentle soap and water  Do not scrub, be gentle  Apply white petroleum/Vaseline after cleaning the wound with a cotton swab or a clean finger, and keep the site covered with a Bandaid /bandage. Bandages are not necessary with a scalp biopsy  If you are unable to cover the site with a Bandaid /bandage, re-apply ointment 2-3 times a day to keep the site moist. Moisture will help with healing  Avoid strenuous activity for first 1-2 days  Avoid lakes, rivers, pools, and oceans until the stitches are removed or the site is healed    How do I clean my wound?  Wash hands thoroughly with soap or use hand  before all wound care  Clean  the wound with gentle soap and water  Apply white petroleum/Vaseline  to wound after it is clean  Replace the Bandaid /bandage to keep the wound covered for the first few days or as instructed by your doctor  If you had a scalp biopsy, warm shower water to the area on a daily basis should suffice    What should I use to clean my wound?   Cotton-tipped applicators (Qtips )  White petroleum jelly (Vaseline ). Use a clean new container and use Q-tips to apply.  Bandaids  as needed  Gentle soap     How should I care for my wound long term?  Do not get your wound dirty  Keep up with wound care for one week or until the area is healed.  If you have stitches, stitches need to be removed in 0 days. You may return to our clinic for this or you may have it done locally at your doctor s office.  A small scab will form and fall off by itself when the area is completely healed. The area will be red and will become pink in color as it heals. Sun protection is very important for how your scar will turn out. Sunscreen with an SPF 30 or greater is recommended once the area is healed.  You should have some soreness but it should be mild and slowly go away over several days. Talk to your doctor about using tylenol for pain,    When should I call my doctor?  If you have increased:   Pain or swelling  Pus or drainage (clear or slightly yellow drainage is ok)  Temperature over 100F  Spreading redness or warmth around wound    When will I hear about my results?  The biopsy results can take 2 weeks to come back.  Your results will automatically release to Pager before your provider has even reviewed them.  The clinic will call you with the results, send you a Pager message, or have you schedule a follow-up clinic or phone time to discuss the results.  Contact our clinics if you do not hear from us in 2 weeks.    Who should I call with questions?  [facility]: [phone]  [facility]  [place]: [phone]  For urgent needs outside of business hours call the [facility] at [phone] and ask for the dermatology resident on call Cryotherapy    What is it?  Use of a very cold liquid, such as liquid nitrogen, to freeze and destroy abnormal skin cells that need to be removed    What should I expect?  Tenderness and redness  A small blister that might grow and fill with dark purple blood. There may be crusting.  More than one treatment may be needed if the lesions do not go away.    How do I care for the treated area?  Gently wash the area with your hands when bathing.  Use a thin layer of Vaseline to help with healing. You may use a Band-Aid.   The area should heal within 7-10 days and may leave behind a pink or lighter color.   Do not use an antibiotic or Neosporin ointment.   You may take acetaminophen (Tylenol) for pain.     Call your doctor if you have:  Severe pain  Signs of infection (warmth, redness, cloudy yellow drainage, and or a bad smell)  Questions or concerns    Who should I call with questions?      [facility]: [phone]      [facility]: [phone]      For urgent needs outside of business hours call the [facility] at [phone] and ask for the dermatology resident on call     Checking for Skin Cancer  You can help find cancer early by checking your skin each month. There are 3 main kinds of skin cancer: melanoma, basal cell carcinoma, and squamous cell carcinoma. Doing monthly skin checks is the best way to find new marks, sores, or skin changes. Follow these instructions for checking your skin.   The ABCDEs of checking moles for melanoma   Check your moles or growths for signs of melanoma using ABCDE:   Asymmetry: The sides of the mole or growth don t match.  Border: The edges are ragged, notched, or blurred.  Color: The color within the mole or growth varies. It could be black, brown, tan, white,  or shades of red, gray, or blue.  Diameter: The mole or growth is larger than   inch or 6 mm (size of a pencil eraser).  Evolving: The size, shape, texture, or color of the mole or growth is changing.     ABCDE's of moles on light skin.        ABCDE's of moles on dark skin may be harder to identify.     Checking for other types of skin cancer  Basal cell carcinoma or squamous cell carcinoma cause symptoms like:     A spot or mole that looks different from all other marks on your skin  Changes in how an area feels, such as itching, tenderness, or pain  Changes in the skin's surface, such as oozing, bleeding, or scaliness  A sore that doesn't heal  New swelling, redness, or spread of color beyond the border of a mole    Who s at risk?  Anyone of any skin color can get skin cancer. But you're at greater risk if you have:   Fair skin that freckles easily and burns instead of tanning  Light-colored or red hair  Light-colored eyes  Many moles or abnormal moles on your skin  A long history of unprotected exposure to sunlight or tanning beds  A history of many blistering sunburns as a child or teen  A family history of skin cancer  Been exposed to radiation or chemicals  A weakened immune system  Been exposed to arsenic  If you've had skin cancer in the past, you're at high risk of having it again.   How to check your skin  Do your monthly skin checkups in front of a full-length mirror. Use a room with good lighting so it's easier to see. Use a hand mirror to look at hard-to-see places like your buttocks and back. You can also have a trusted friend or family member help you with these checks. Check every part of your body, including your:   Head (ears, face, neck, and scalp)  Torso (front, back, sides, and under breasts)  Arms (tops, undersides, and armpits)  Hands (palms, backs, and fingers, including under the nails)  Lower back, buttocks, and genitals  Legs (front, back, and sides)  Feet (tops, soles, toes, including  under the nails, and between toes)  Watch for new spots on your skin or a spot that's changing in color, shape, size.   If you have a lot of moles, take digital photos of them each month. Make sure to take photos both up close and from a distance. These can help you see if any moles change over time.   Know your skin  Most skin changes aren't cancer. But if you see any changes in your skin, call your healthcare provider right away. Only they can tell you if a change is a problem. If you have skin cancer, seeing your provider can be the first step to getting the treatment that could save your life.   [employer] last reviewed this educational content on 10/1/2021    4769-8723 [employer]. All rights reserved. This information is not intended as a substitute for professional medical care. Always follow your healthcare professional's instructions.

## 2025-07-15 NOTE — PROGRESS NOTES
[facility] Dermatology Note  Encounter Date: Jul 16, 2025  Office Visit     Dermatology Problem List:  0. NUB, right preauricular cheek; ISK vs SCC  - s/p shave biopsy 7/16/25    1. 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 History of dysplastic nevus (per patient)  3. Onychomycosis/Tinea Pedis  4. Aks- s/p cryotherapy  5. 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 Intertrigo, groin  - Current tx:  triamcinolone 0.025% ointment, nystatin cream  - Past tx: hydrocortisone 2.5% ointment,  7. 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 ISK, s/p cryotherapy  9. Pilar cyst, R occipital scalp   - s/p excision 06/10/2025    ____________________________________________    Assessment & Plan:    # Neoplasm of unspecified behavior of the skin (D49.2) on the right preauricular cheek . The differential diagnosis includes ISK vs SCC.   - Shave biopsy performed today (see procedure note(s) below).      # Seborrheic keratosis, inflamed, right post auricular area x 1  - Cryotherapy performed today (see procedure note(s) below).      # S/p cyst excision, right occipital scalp s/p excision 6/10/25  -Healing well.     Procedures Performed:   - Shave biopsy procedure note, location(s): right preauricular  cheek After discussion of benefits and risks including but not limited to bleeding, infection, scar, incomplete removal, recurrence, and non-diagnostic biopsy, verbal consent and photographs were obtained. The area was cleaned with isopropyl alcohol. 0.5mL of 1% lidocaine with epinephrine was injected to obtain adequate anesthesia of lesion(s). Shave biopsy at site(s) performed. Hemostasis was achieved with aluminium chloride. Petrolatum ointment and a sterile dressing were applied. The patient tolerated the procedure and no complications were noted. The patient was provided with verbal and written post care instructions.     - Cryotherapy procedure note, location(s): postauricular ear. After verbal consent and discussion of risks and benefits including, but not limited to, dyspigmentation/scar, blister, and pain, 1 lesion(s) was(were) treated with 1-2 mm freeze border for 1-2 cycles with liquid nitrogen. Post cryotherapy instructions were provided.    Follow-up: keep scheduled appointment for 11/11/25    Staff and Scribe:     Scribe Disclosure:   By signing my name below, I, Roma Lerner, attest that this document has been prepared under the direction and in the presence of Imelda Rincon PA-C      Electronically signed by: Roma Lerner 7/16/25    Provider Disclosure:   The documentation recorded by the scribe accurately reflects the services I personally performed and the decisions made by me.    All risks, benefits and alternatives were discussed with patient.  Patient is in agreement and understands the assessment and plan.  All questions were answered.  Sun Screen Education was given.   Return to Clinic in 6 months or sooner as needed.   Imelda Rincon PA-C   [facility]    ____________________________________________    CC: Derm Problem (Spot of concern- R temple, has been there since June)    HPI:  Mr. Juan Jose Razo is a(n) 74 year old male who presents today as  a return patient for spot check. Last seen in dermatology 6/10/25 for excision of pilar cyst on the R occipital scalp.    Today, patient reports back of scalp is healing well after his procedure on 6/10/25. He reports, a new spot on the right cheek. States that the spot is tender to touch. He also has an itchy spot behind his right ear.       Patient is otherwise feeling well, without additional skin concerns.    Labs Reviewed:  N/A    Physical Exam:  Vitals: There were no vitals taken for this visit.  SKIN: Focused examination of face and scalp was performed.  - 6 mm flesh colored irregular scaly papule on the right preauricular cheek  - There is a tan to brown waxy stuck on papule with surrounding erythema on the postauricular ear .   - No other lesions of concern on areas examined.         Medications:  Current Outpatient Medications   Medication Sig Dispense Refill    amLODIPine (NORVASC) 5 MG tablet TAKE TWO TABLETS BY MOUTH ONCE DAILY 180 tablet 2    FAMOTIDINE 20 MG PO tablet TAKE TWO TABLETS BY MOUTH ONCE DAILY 60 tablet 0    fish oil-omega-3 fatty acids 500 MG capsule Take 2 capsules by mouth daily. Pro omega  650mg EPA & 450 mg DHA      metoprolol succinate ER (TOPROL XL) 100 MG 24 hr tablet TAKE 1 TABLET (100 MG) BY MOUTH DAILY 90 tablet 2    Multiple Vitamins-Minerals (CENTRUM SILVER ULTRA MENS PO)       nystatin (MYCOSTATIN) 636637 UNIT/GM external cream Apply daily to the groin. Increase to twice daily when rashes are present and self mix with triamcinolone. 60 g 11    omeprazole (PRILOSEC) 40 MG DR capsule TAKE ONE CAPSULE BY MOUTH ONCE DAILY 90 capsule 3    triamcinolone (KENALOG) 0.025 % external ointment Apply topically 2 times daily. To groin rash until resolved. 60 g 5    triamcinolone (KENALOG) 0.1 % external ointment Apply topically 2 times daily. 30 g 0    vitamin D3 (CHOLECALCIFEROL) 1000 units (25 mcg) tablet Take by mouth daily      sildenafil (REVATIO) 20 MG tablet Take 2-3 as needed for  ED (Patient not taking: Reported on 7/16/2025) 20 tablet 0     No current facility-administered medications for this visit.      Past Medical History:   Patient Active Problem List   Diagnosis    CARDIOVASCULAR SCREENING; LDL GOAL LESS THAN 130    Hypertension goal BP (blood pressure) < 140/90    GERD (gastroesophageal reflux disease)    Seasonal allergic rhinitis    Skin exam, screening for cancer    Multiple benign nevi    Seborrheic keratoses    Skin cancer screening    Morbid obesity (H)    Adenomatous colon polyp     Past Medical History:   Diagnosis Date    Acid reflux     Allergies     HTN (hypertension) 1990    Squamous cell carcinoma     Varicose veins         CC No referring provider defined for this encounter. on close of this encounter.

## 2025-07-16 ENCOUNTER — OFFICE VISIT (OUTPATIENT)
Dept: DERMATOLOGY | Facility: CLINIC | Age: 75
End: 2025-07-16
Payer: COMMERCIAL

## 2025-07-16 DIAGNOSIS — L82.0 INFLAMED SEBORRHEIC KERATOSIS: ICD-10-CM

## 2025-07-16 DIAGNOSIS — D49.2 NEOPLASM OF UNSPECIFIED BEHAVIOR OF BONE, SOFT TISSUE, AND SKIN: Primary | ICD-10-CM

## 2025-07-16 PROCEDURE — 88305 TISSUE EXAM BY PATHOLOGIST: CPT | Mod: 26 | Performed by: DERMATOLOGY

## 2025-07-16 PROCEDURE — 88305 TISSUE EXAM BY PATHOLOGIST: CPT | Mod: TC | Performed by: PHYSICIAN ASSISTANT

## 2025-07-16 ASSESSMENT — PAIN SCALES - GENERAL: PAINLEVEL_OUTOF10: MILD PAIN (1)

## 2025-07-16 NOTE — LETTER
7/16/2025       RE: Juan Jose Razo  [address] [address]     Dear Colleague,    Thank you for referring your patient, Juan Jose Razo, to the [facility] [place] [facility]. Please see a copy of my visit note below.    [facility] Dermatology Note  Encounter Date: Jul 16, 2025  Office Visit     Dermatology Problem List:  0. NUB, right preauricular cheek; ISK vs SCC  - s/p shave biopsy 7/16/25    1. 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 History of dysplastic nevus (per patient)  3. Onychomycosis/Tinea Pedis  4. Aks- s/p cryotherapy  5. 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 Intertrigo, groin  - Current tx:  triamcinolone 0.025% ointment, nystatin cream  - Past tx: hydrocortisone 2.5% ointment,  7. 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 ISK, s/p cryotherapy  9. Pilar cyst, R occipital scalp   - s/p excision 06/10/2025    ____________________________________________    Assessment & Plan:    # Neoplasm of unspecified behavior of the skin (D49.2) on the right preauricular cheek . The differential diagnosis includes ISK vs SCC.   - Shave biopsy performed today (see procedure  note(s) below).      # Seborrheic keratosis, inflamed, right post auricular area x 1  - Cryotherapy performed today (see procedure note(s) below).      # S/p cyst excision, right occipital scalp s/p excision 6/10/25  -Healing well.     Procedures Performed:   - Shave biopsy procedure note, location(s): right preauricular cheek After discussion of benefits and risks including but not limited to bleeding, infection, scar, incomplete removal, recurrence, and non-diagnostic biopsy, verbal consent and photographs were obtained. The area was cleaned with isopropyl alcohol. 0.5mL of 1% lidocaine with epinephrine was injected to obtain adequate anesthesia of lesion(s). Shave biopsy at site(s) performed. Hemostasis was achieved with aluminium chloride. Petrolatum ointment and a sterile dressing were applied. The patient tolerated the procedure and no complications were noted. The patient was provided with verbal and written post care instructions.     - Cryotherapy procedure note, location(s): postauricular ear. After verbal consent and discussion of risks and benefits including, but not limited to, dyspigmentation/scar, blister, and pain, 1 lesion(s) was(were) treated with 1-2 mm freeze border for 1-2 cycles with liquid nitrogen. Post cryotherapy instructions were provided.    Follow-up: keep scheduled appointment for 11/11/25    Staff and Scribe:     Scribe Disclosure:   By signing my name below, I, Roma Lerner, attest that this document has been prepared under the direction and in the presence of Imelda Rincon PA-C      Electronically signed by: Roma Lerner 7/16/25    Provider Disclosure:   The documentation recorded by the scribe accurately reflects the services I personally performed and the decisions made by me.    All risks, benefits and alternatives were discussed with patient.  Patient is in agreement and understands the assessment and plan.  All questions were answered.  Sun Screen Education was given.    Return to Clinic in 6 months or sooner as needed.   Imelda Rincon PA-C   [facility]    ____________________________________________    CC: Derm Problem (Spot of concern- R temple, has been there since June)    HPI:  Mr. Juan Jose Razo is a(n) 74 year old male who presents today as a return patient for spot check. Last seen in dermatology 6/10/25 for excision of pilar cyst on the R occipital scalp.    Today, patient reports back of scalp is healing well after his procedure on 6/10/25. He reports, a new spot on the right cheek. States that the spot is tender to touch. He also has an itchy spot behind his right ear.       Patient is otherwise feeling well, without additional skin concerns.    Labs Reviewed:  N/A    Physical Exam:  Vitals: There were no vitals taken for this visit.  SKIN: Focused examination of face and scalp was performed.  - 6 mm flesh colored irregular scaly papule on the right preauricular cheek  - There is a tan to brown waxy stuck on papule with surrounding erythema on the postauricular ear .   - No other lesions of concern on areas examined.         Medications:  Current Outpatient Medications   Medication Sig Dispense Refill     amLODIPine (NORVASC) 5 MG tablet TAKE TWO TABLETS BY MOUTH ONCE DAILY 180 tablet 2     FAMOTIDINE 20 MG PO tablet TAKE TWO TABLETS BY MOUTH ONCE DAILY 60 tablet 0     fish oil-omega-3 fatty acids 500 MG capsule Take 2 capsules by mouth daily. Pro omega  650mg EPA & 450 mg DHA       metoprolol succinate ER (TOPROL XL) 100 MG 24 hr tablet TAKE 1 TABLET (100 MG) BY MOUTH DAILY 90 tablet 2     Multiple Vitamins-Minerals (CENTRUM SILVER ULTRA MENS PO)        nystatin (MYCOSTATIN) 074189 UNIT/GM external cream Apply daily to the groin. Increase to twice daily when rashes are present and self mix with triamcinolone. 60 g 11     omeprazole (PRILOSEC) 40 MG DR capsule TAKE ONE CAPSULE BY MOUTH ONCE DAILY 90 capsule 3     triamcinolone  (KENALOG) 0.025 % external ointment Apply topically 2 times daily. To groin rash until resolved. 60 g 5     triamcinolone (KENALOG) 0.1 % external ointment Apply topically 2 times daily. 30 g 0     vitamin D3 (CHOLECALCIFEROL) 1000 units (25 mcg) tablet Take by mouth daily       sildenafil (REVATIO) 20 MG tablet Take 2-3 as needed for ED (Patient not taking: Reported on 7/16/2025) 20 tablet 0     No current facility-administered medications for this visit.      Past Medical History:   Patient Active Problem List   Diagnosis     CARDIOVASCULAR SCREENING; LDL GOAL LESS THAN 130     Hypertension goal BP (blood pressure) < 140/90     GERD (gastroesophageal reflux disease)     Seasonal allergic rhinitis     Skin exam, screening for cancer     Multiple benign nevi     Seborrheic keratoses     Skin cancer screening     Morbid obesity (H)     Adenomatous colon polyp     Past Medical History:   Diagnosis Date     Acid reflux      Allergies      HTN (hypertension) 1990     Squamous cell carcinoma      Varicose veins         CC No referring provider defined for this encounter. on close of this encounter.      Lidocaine-epinephrine 1-1:139716 % injection   0.5mL once for one use, starting 7/16/2025 ending 7/16/2025,  2mL disp, R-0, injection  Injected by FELIPE Macdonald    Again, thank you for allowing me to participate in the care of your patient.      Sincerely,    Imelda Rincon PA-C

## 2025-07-16 NOTE — NURSING NOTE
Dermatology Rooming Note    Juan Jose aRzo's goals for this visit include:   Chief Complaint   Patient presents with    Derm Problem     Spot of concern- R aby, has been there since FELIPE Harp

## 2025-07-16 NOTE — PROGRESS NOTES
Lidocaine-epinephrine 1-1:211355 % injection   0.5mL once for one use, starting 7/16/2025 ending 7/16/2025,  2mL disp, R-0, injection  Injected by FELIPE Macdonald

## 2025-07-17 LAB
PATH REPORT.COMMENTS IMP SPEC: ABNORMAL
PATH REPORT.COMMENTS IMP SPEC: ABNORMAL
PATH REPORT.COMMENTS IMP SPEC: YES
PATH REPORT.FINAL DX SPEC: ABNORMAL
PATH REPORT.GROSS SPEC: ABNORMAL
PATH REPORT.MICROSCOPIC SPEC OTHER STN: ABNORMAL
PATH REPORT.RELEVANT HX SPEC: ABNORMAL

## 2025-09-02 ENCOUNTER — OFFICE VISIT (OUTPATIENT)
Dept: URGENT CARE | Facility: URGENT CARE | Age: 75
End: 2025-09-02
Payer: COMMERCIAL

## 2025-09-02 VITALS
OXYGEN SATURATION: 96 % | HEART RATE: 88 BPM | WEIGHT: 300.5 LBS | RESPIRATION RATE: 17 BRPM | BODY MASS INDEX: 37.56 KG/M2 | SYSTOLIC BLOOD PRESSURE: 164 MMHG | TEMPERATURE: 98.7 F | DIASTOLIC BLOOD PRESSURE: 83 MMHG

## 2025-09-02 DIAGNOSIS — J20.9 ACUTE BRONCHITIS WITH COEXISTING CONDITION REQUIRING PROPHYLACTIC TREATMENT: Primary | ICD-10-CM

## 2025-09-02 PROCEDURE — 3077F SYST BP >= 140 MM HG: CPT | Performed by: PHYSICIAN ASSISTANT

## 2025-09-02 PROCEDURE — 1126F AMNT PAIN NOTED NONE PRSNT: CPT | Performed by: PHYSICIAN ASSISTANT

## 2025-09-02 PROCEDURE — 3079F DIAST BP 80-89 MM HG: CPT | Performed by: PHYSICIAN ASSISTANT

## 2025-09-02 PROCEDURE — 99213 OFFICE O/P EST LOW 20 MIN: CPT | Performed by: PHYSICIAN ASSISTANT

## 2025-09-02 RX ORDER — BENZONATATE 200 MG/1
200 CAPSULE ORAL 3 TIMES DAILY PRN
Qty: 30 CAPSULE | Refills: 0 | Status: SHIPPED | OUTPATIENT
Start: 2025-09-02 | End: 2025-09-12

## 2025-09-02 RX ORDER — ALBUTEROL SULFATE 90 UG/1
2 INHALANT RESPIRATORY (INHALATION) EVERY 4 HOURS PRN
Qty: 18 G | Refills: 0 | Status: SHIPPED | OUTPATIENT
Start: 2025-09-02

## 2025-09-02 RX ORDER — AZITHROMYCIN 250 MG/1
TABLET, FILM COATED ORAL
Qty: 6 TABLET | Refills: 0 | Status: SHIPPED | OUTPATIENT
Start: 2025-09-02

## 2025-09-02 ASSESSMENT — ENCOUNTER SYMPTOMS
NAUSEA: 0
SINUS PAIN: 0
WHEEZING: 0
SINUS PRESSURE: 0
COUGH: 1
RHINORRHEA: 0
SORE THROAT: 0
FEVER: 0
DIARRHEA: 0
FATIGUE: 0
CHILLS: 0
PALPITATIONS: 0
VOMITING: 0
CARDIOVASCULAR NEGATIVE: 1
SHORTNESS OF BREATH: 1

## 2025-09-02 ASSESSMENT — PAIN SCALES - GENERAL: PAINLEVEL_OUTOF10: NO PAIN (0)

## (undated) RX ORDER — FENTANYL CITRATE 50 UG/ML
INJECTION, SOLUTION INTRAMUSCULAR; INTRAVENOUS
Status: DISPENSED
Start: 2021-12-06

## (undated) RX ORDER — FENTANYL CITRATE 50 UG/ML
INJECTION, SOLUTION INTRAMUSCULAR; INTRAVENOUS
Status: DISPENSED
Start: 2025-01-27